# Patient Record
Sex: MALE | Race: WHITE | NOT HISPANIC OR LATINO | Employment: OTHER | URBAN - METROPOLITAN AREA
[De-identification: names, ages, dates, MRNs, and addresses within clinical notes are randomized per-mention and may not be internally consistent; named-entity substitution may affect disease eponyms.]

---

## 2017-03-09 ENCOUNTER — OFFICE VISIT (OUTPATIENT)
Dept: INTERNAL MEDICINE | Facility: CLINIC | Age: 67
End: 2017-03-09

## 2017-03-09 VITALS
WEIGHT: 187 LBS | SYSTOLIC BLOOD PRESSURE: 120 MMHG | HEART RATE: 80 BPM | TEMPERATURE: 97.4 F | DIASTOLIC BLOOD PRESSURE: 74 MMHG | OXYGEN SATURATION: 97 % | BODY MASS INDEX: 32.1 KG/M2 | RESPIRATION RATE: 16 BRPM

## 2017-03-09 DIAGNOSIS — J01.00 ACUTE NON-RECURRENT MAXILLARY SINUSITIS: Primary | ICD-10-CM

## 2017-03-09 PROCEDURE — 99213 OFFICE O/P EST LOW 20 MIN: CPT | Performed by: NURSE PRACTITIONER

## 2017-03-09 RX ORDER — AZITHROMYCIN 250 MG/1
TABLET, FILM COATED ORAL
Qty: 6 TABLET | Refills: 0 | Status: SHIPPED | OUTPATIENT
Start: 2017-03-09 | End: 2017-05-16

## 2017-03-09 RX ORDER — CETIRIZINE HYDROCHLORIDE 10 MG/1
10 TABLET ORAL DAILY
Qty: 30 TABLET | Refills: 2 | Status: SHIPPED | OUTPATIENT
Start: 2017-03-09 | End: 2018-06-08

## 2017-03-09 RX ORDER — AZITHROMYCIN 250 MG/1
TABLET, FILM COATED ORAL
COMMUNITY
Start: 2017-03-05 | End: 2017-03-09

## 2017-03-09 NOTE — PROGRESS NOTES
Vitals:    03/09/17 1308   BP: 120/74   Pulse: 80   Resp: 16   Temp: 97.4 °F (36.3 °C)   SpO2: 97%     Last 2 weights    03/09/17  1308   Weight: 187 lb (84.8 kg)     Social History   Substance Use Topics   • Smoking status: Never Smoker   • Smokeless tobacco: Not on file   • Alcohol use No       Subjective     HPI  Sunday went to [facility] and dx with sinusitis, given zpack. Last pill taken today. Today feeling a little better but still having some nasal congestion. Pt worried that he may regress when the weekend approaches. He did start taking his flonase this past week as well. Does not take an antihistamine. Pt has been drinking fluids.    The following portions of the patient's history were reviewed and updated as appropriate: allergies, current medications, past medical history, past social history and problem list.    Review of Systems   Constitutional: Negative.    HENT: Positive for congestion (nasal) and sinus pressure.    Respiratory: Negative.    Cardiovascular: Negative.        Objective     Physical Exam   Constitutional: He is oriented to person, place, and time. He appears well-developed and well-nourished.   HENT:   Head: Normocephalic and atraumatic.   Nose: Right sinus exhibits maxillary sinus tenderness. Left sinus exhibits maxillary sinus tenderness.   Neck: Normal range of motion.   Cardiovascular: Normal rate, regular rhythm and normal heart sounds.    Pulmonary/Chest: Effort normal and breath sounds normal.   Musculoskeletal: Normal range of motion.   Neurological: He is alert and oriented to person, place, and time.   Nursing note and vitals reviewed.      Assessment/Plan   Brian was seen today for sinusitis and uri.    Diagnoses and all orders for this visit:    Acute non-recurrent maxillary sinusitis    Other orders  -     azithromycin (ZITHROMAX) 250 MG tablet; Take 2 tablets the first day, then 1 tablet daily for 4 days.  -     cetirizine (zyrTEC) 10 MG tablet; Take 1 tablet by mouth  Daily.         -sent another zpack in. Advised pt to give till end of tomorrow to start another zpack. If symptoms continue to improve then do not start antibiotic. If he feels like symptoms are starting to worsen then start another zpack  -neti pot with distilled water, steamy shower therapy  -cont to drink fluids  -cont flonase consistently, and try zyrtec daily  -cont other home meds  -FU prn or if symptoms persist/worsen

## 2017-03-30 RX ORDER — SITAGLIPTIN 100 MG/1
TABLET, FILM COATED ORAL
Qty: 90 TABLET | Refills: 1 | Status: SHIPPED | OUTPATIENT
Start: 2017-03-30 | End: 2017-10-05 | Stop reason: SDUPTHER

## 2017-04-17 ENCOUNTER — OFFICE VISIT (OUTPATIENT)
Dept: INTERNAL MEDICINE | Facility: CLINIC | Age: 67
End: 2017-04-17

## 2017-04-17 VITALS
HEART RATE: 68 BPM | DIASTOLIC BLOOD PRESSURE: 71 MMHG | SYSTOLIC BLOOD PRESSURE: 129 MMHG | WEIGHT: 186.8 LBS | OXYGEN SATURATION: 96 % | BODY MASS INDEX: 31.89 KG/M2 | HEIGHT: 64 IN | TEMPERATURE: 97.5 F

## 2017-04-17 DIAGNOSIS — E11.9 NON-INSULIN DEPENDENT TYPE 2 DIABETES MELLITUS (HCC): ICD-10-CM

## 2017-04-17 DIAGNOSIS — N52.9 ED (ERECTILE DYSFUNCTION) OF ORGANIC ORIGIN: ICD-10-CM

## 2017-04-17 DIAGNOSIS — E78.49 OTHER HYPERLIPIDEMIA: ICD-10-CM

## 2017-04-17 DIAGNOSIS — R53.83 OTHER FATIGUE: ICD-10-CM

## 2017-04-17 DIAGNOSIS — J30.1 NON-SEASONAL ALLERGIC RHINITIS DUE TO POLLEN: ICD-10-CM

## 2017-04-17 DIAGNOSIS — Z00.00 WELCOME TO MEDICARE PREVENTIVE VISIT: Primary | ICD-10-CM

## 2017-04-17 PROCEDURE — 99214 OFFICE O/P EST MOD 30 MIN: CPT | Performed by: INTERNAL MEDICINE

## 2017-04-17 PROCEDURE — G0403 EKG FOR INITIAL PREVENT EXAM: HCPCS | Performed by: INTERNAL MEDICINE

## 2017-04-17 PROCEDURE — G0402 INITIAL PREVENTIVE EXAM: HCPCS | Performed by: INTERNAL MEDICINE

## 2017-04-17 RX ORDER — ERGOCALCIFEROL 1.25 MG/1
50000 CAPSULE ORAL
COMMUNITY
End: 2017-12-04 | Stop reason: SDUPTHER

## 2017-04-17 RX ORDER — VARDENAFIL HYDROCHLORIDE 20 MG/1
20 TABLET ORAL EVERY 24 HOURS
COMMUNITY
End: 2017-12-04 | Stop reason: SDUPTHER

## 2017-04-17 RX ORDER — OMEGA-3 FATTY ACIDS/FISH OIL 300-1000MG
1 CAPSULE ORAL
COMMUNITY
End: 2017-12-04

## 2017-04-17 RX ORDER — PROMETHAZINE HYDROCHLORIDE 25 MG/1
25 TABLET ORAL
COMMUNITY
Start: 2017-02-16 | End: 2017-12-21

## 2017-04-17 NOTE — PROGRESS NOTES
QUICK REFERENCE INFORMATION:  The ABCs of the Annual Wellness Visit    Welcome to Medicare Visit    HEALTH RISK ASSESSMENT    1950    Recent Hospitalizations:  No recent hospitalization(s)..      Current Medical Providers:  Patient Care Team:  Ludwin Reyna MD as PCP - General  Jensen Cabrales MD as PCP - Claims Attributed - PLEASE DO NOT REMOVE      Smoking Status:  History   Smoking Status   • Never Smoker   Smokeless Tobacco   • Not on file       Alcohol Consumption:  History   Alcohol Use No       Depression Screen:   PHQ-9 Depression Screening 4/17/2017   Little interest or pleasure in doing things 0   Feeling down, depressed, or hopeless 0   Trouble falling or staying asleep, or sleeping too much 0   Feeling tired or having little energy 0   Poor appetite or overeating 0   Feeling bad about yourself - or that you are a failure or have let yourself or your family down 0   Trouble concentrating on things, such as reading the newspaper or watching television 0   Moving or speaking so slowly that other people could have noticed. Or the opposite - being so fidgety or restless that you have been moving around a lot more than usual 0   Thoughts that you would be better off dead, or of hurting yourself in some way 0   PHQ-9 Total Score 0   If you checked off any problems, how difficult have these problems made it for you to do your work, take care of things at home, or get along with other people? Not difficult at all       Health Habits and Functional and Cognitive Screening:  Functional & Cognitive Status 4/17/2017   Do you have difficulty preparing food and eating? No   Do you have difficulty bathing yourself? No   Do you have difficulty getting dressed? No   Do you have difficulty using the toilet? No   Do you have difficulty moving around from place to place? No   In the past year have you fallen or experienced a near fall? Yes   Do you need help using the phone?  No   Are you deaf or do you have  serious difficulty hearing?  Yes   Do you need help with transportation? No   Do you need help shopping? No   Do you need help preparing meals?  No   Do you need help with housework?  No   Do you need help with laundry? No   Do you need help taking your medications? No   Do you need help managing money? No       Health Habits  Current Diet: Well Balanced Diet  Dental Exam: Up to date  Eye Exam: Up to date  Exercise (times per week): 3 times per week  Current Exercise Activities Include: Walking        Does the patient have evidence of cognitive impairment? No    Aspirin use counseling? Taking ASA appropriately as indicated      Recent Lab Results:  CMP:  Lab Results   Component Value Date     (H) 08/23/2016    BUN 15 08/23/2016    CREATININE 0.96 08/23/2016    EGFRIFNONA 79 08/23/2016    EGFRIFAFRI 95 08/23/2016    BCR 15.6 08/23/2016     08/23/2016    K 4.3 08/23/2016    CO2 26.7 08/23/2016    CALCIUM 9.3 08/23/2016    PROTENTOTREF 7.0 08/23/2016    ALBUMIN 4.80 08/23/2016    LABGLOBREF 2.2 08/23/2016    LABIL2 2.2 08/23/2016    BILITOT 0.3 08/23/2016    ALKPHOS 27 (L) 08/23/2016    AST 21 08/23/2016    ALT 33 08/23/2016     Lipid Panel:  Lab Results   Component Value Date    CHLPL 156 08/23/2016    TRIG 104 08/23/2016    HDL 44 08/23/2016    VLDL 20.8 08/23/2016    LDL 91 08/23/2016     HbA1c:  Lab Results   Component Value Date    HGBA1C 8.60 (H) 08/23/2016       Visual Acuity:   Visual Acuity Screening    Right eye Left eye Both eyes   Without correction: 20/20 20/20 20/20   With correction:          Age-appropriate Screening Schedule:  Refer to the list below for future screening recommendations based on patient's age, sex and/or medical conditions. Orders for these recommended tests are listed in the plan section. The patient has been provided with a written plan.    Health Maintenance   Topic Date Due   • TDAP/TD VACCINES (1 - Tdap) 05/31/1970   • DIABETIC FOOT EXAM  02/15/2016   • URINE  MICROALBUMIN  05/19/2016   • PNEUMOCOCCAL VACCINES (65+ LOW/MEDIUM RISK) (1 of 2 - PCV13) 05/31/2016   • INFLUENZA VACCINE  08/01/2016   • HEMOGLOBIN A1C  02/23/2017   • LIPID PANEL  08/23/2017   • DIABETIC EYE EXAM  12/06/2017   • COLONOSCOPY  02/15/2021   • ZOSTER VACCINE  Completed        Subjective   History of Present Illness    Brian Garcia is a 66 y.o. male an established patient presenting for a Welcome to Medicare Visit.     The following portions of the patient's history were reviewed and updated as appropriate: allergies, current medications, past family history, past medical history, past social history, past surgical history and problem list.    Outpatient Medications Prior to Visit   Medication Sig Dispense Refill   • aspirin 81 MG tablet Take 81 mg by mouth daily.     • atorvastatin (LIPITOR) 20 MG tablet Take 1 tablet by mouth daily.     • azithromycin (ZITHROMAX) 250 MG tablet Take 2 tablets the first day, then 1 tablet daily for 4 days. 6 tablet 0   • cetirizine (zyrTEC) 10 MG tablet Take 1 tablet by mouth Daily. 30 tablet 2   • EPINEPHrine (EPIPEN) 0.3 MG/0.3ML solution auto-injector injection EpiPen 2-Jeferson 0.3 MG/0.3ML FRANSISCA; Patient Sig: EpiPen 2-Jeferson 0.3 MG/0.3ML FRANSISCA USE AS DIRECTED.; 3; 3; 03-Jun-2013; Active     • famotidine (PEPCID) 20 MG tablet Take by mouth.     • fluticasone (FLONASE) 50 MCG/ACT nasal spray 1 spray into each nostril 2 (two) times a day.     • glipiZIDE (GLUCOTROL) 10 MG tablet TAKE 1 TABLET BY MOUTH 2 (TWO) TIMES A DAY BEFORE MEALS. 180 tablet 1   • HYDROcodone-acetaminophen (NORCO) 5-325 MG per tablet Take 1 tablet by mouth every 6 (six) hours as needed for moderate pain (4-6). 14 tablet 0   • JANUVIA 100 MG tablet TAKE 1 TABLET DAILY 90 tablet 1   • lisinopril (PRINIVIL,ZESTRIL) 2.5 MG tablet Take 1 tablet by mouth nightly.     • metaxalone (SKELAXIN) 800 MG tablet Take 1 tablet by mouth 3 (three) times a day as needed (pain). 18 tablet 0   • ofloxacin (OCUFLOX) 0.3 %  "ophthalmic solution Apply to eye.     • pioglitazone (ACTOS) 30 MG tablet Take 1 tablet by mouth daily. 30 tablet 5   • vardenafil (LEVITRA) 20 MG tablet Take 20 mg by mouth daily as needed for erectile dysfunction.     • vitamin D (ERGOCALCIFEROL) 25244 UNITS capsule capsule TAKE ONE CAPSULE BY MOUTH ONCE A MONTH 12 capsule 2     No facility-administered medications prior to visit.        Patient Active Problem List   Diagnosis   • Acute bronchitis   • Acute pharyngitis   • Acute upper respiratory infection   • Allergic rhinitis   • Carpal tunnel syndrome   • Cough   • Non-insulin dependent type 2 diabetes mellitus   • Alimentary obesity   • Fatigue   • HLD (hyperlipidemia)   • BP (high blood pressure)   • Effusion of knee   • Gonalgia   • ED (erectile dysfunction) of organic origin   • Muscle ache   • Thumb pain       Advance Care Planning:  has NO advance directive - not interested in additional information    Identification of Risk Factors:  Risk factors include: weight .    Review of Systems    Compared to one year ago, the patient feels his physical health is the same.  Compared to one year ago, the patient feels his mental health is the same.    Objective    Physical Exam    Vitals:    04/17/17 0854   BP: 129/71   BP Location: Left arm   Patient Position: Sitting   Cuff Size: Adult   Pulse: 68   Temp: 97.5 °F (36.4 °C)   TempSrc: Oral   SpO2: 96%   Weight: 186 lb 12.8 oz (84.7 kg)   Height: 64\" (162.6 cm)   PainSc:   4   PainLoc: Knee       Body mass index is 32.06 kg/(m^2).  Discussed the patient's BMI with him. The BMI is above average; BMI management plan is completed.    Procedure   Procedures       Assessment/Plan   Patient Self-Management and Personalized Health Advice  The patient has been provided with information about: diet, exercise and weight management and preventive services including:   · Nutrition counseling provided.    Visit Diagnoses:  No diagnosis found.    No orders of the defined types " were placed in this encounter.      Outpatient Encounter Prescriptions as of 4/17/2017   Medication Sig Dispense Refill   • aspirin 81 MG tablet Take 81 mg by mouth daily.     • atorvastatin (LIPITOR) 20 MG tablet Take 1 tablet by mouth daily.     • azithromycin (ZITHROMAX) 250 MG tablet Take 2 tablets the first day, then 1 tablet daily for 4 days. 6 tablet 0   • cetirizine (zyrTEC) 10 MG tablet Take 1 tablet by mouth Daily. 30 tablet 2   • EPINEPHrine (EPIPEN) 0.3 MG/0.3ML solution auto-injector injection EpiPen 2-Jeferson 0.3 MG/0.3ML FARNSISCA; Patient Sig: EpiPen 2-Jeferson 0.3 MG/0.3ML FRANSISCA USE AS DIRECTED.; 3; 3; 03-Jun-2013; Active     • famotidine (PEPCID) 20 MG tablet Take by mouth.     • fluticasone (FLONASE) 50 MCG/ACT nasal spray 1 spray into each nostril 2 (two) times a day.     • glipiZIDE (GLUCOTROL) 10 MG tablet TAKE 1 TABLET BY MOUTH 2 (TWO) TIMES A DAY BEFORE MEALS. 180 tablet 1   • HYDROcodone-acetaminophen (NORCO) 5-325 MG per tablet Take 1 tablet by mouth every 6 (six) hours as needed for moderate pain (4-6). 14 tablet 0   • JANUVIA 100 MG tablet TAKE 1 TABLET DAILY 90 tablet 1   • lisinopril (PRINIVIL,ZESTRIL) 2.5 MG tablet Take 1 tablet by mouth nightly.     • metaxalone (SKELAXIN) 800 MG tablet Take 1 tablet by mouth 3 (three) times a day as needed (pain). 18 tablet 0   • ofloxacin (OCUFLOX) 0.3 % ophthalmic solution Apply to eye.     • pioglitazone (ACTOS) 30 MG tablet Take 1 tablet by mouth daily. 30 tablet 5   • promethazine (PHENERGAN) 25 MG tablet Take 25 mg by mouth.     • vardenafil (LEVITRA) 20 MG tablet Take 20 mg by mouth daily as needed for erectile dysfunction.     • vitamin D (ERGOCALCIFEROL) 61451 UNITS capsule capsule TAKE ONE CAPSULE BY MOUTH ONCE A MONTH 12 capsule 2   • Acetaminophen 325 MG capsule Take 1 capsule by mouth.     • fluticasone (VERAMYST) 27.5 MCG/SPRAY nasal spray 2 sprays into each nostril Daily.     • Ibuprofen 200 MG capsule Take 1 capsule by mouth.     • vardenafil  (LEVITRA) 20 MG tablet Take 20 mg by mouth Daily.     • vitamin D (ERGOCALCIFEROL) 81077 UNITS capsule capsule Take 50,000 Units by mouth.       No facility-administered encounter medications on file as of 4/17/2017.        Reviewed use of high risk medication in the elderly: yes  Reviewed for potential of harmful drug interactions in the elderly: yes    Follow Up:  No Follow-up on file.     An After Visit Summary and PPPS with all of these plans were given to the patient.

## 2017-04-17 NOTE — PROGRESS NOTES
Procedure     ECG 12 Lead  Date/Time: 4/17/2017 9:40 AM  Performed by: TEDDY GARCIA  Authorized by: TEDDY GARCIA   Comparison: not compared with previous ECG   Rhythm: sinus rhythm  Rate: normal  Conduction: conduction normal  ST Segments: ST segments normal  T Waves: T waves normal  QRS axis: normal  Other: no other findings  Clinical impression: normal ECG

## 2017-04-17 NOTE — PATIENT INSTRUCTIONS
Medicare Wellness  Personal Prevention Plan of Service     Date of Office Visit:  2017  Encounter Provider:  Ludwin Reyna MD  Place of Service:  [facility]  Patient Name: Brian Garcia  :  1950    As part of the Medicare Wellness portion of your visit today, we are providing you with this personalized preventive plan of services (PPPS). This plan is based upon recommendations of the [place] Preventive Services Task Force (USPSTF) and the Advisory Committee on Immunization Practices (ACIP).    This lists the preventive care services that should be considered, and provides dates of when you are due. Items listed as completed are up-to-date and do not require any further intervention.    Health Maintenance   Topic Date Due   • TDAP/TD VACCINES (1 - Tdap) 1970   • HEMOGLOBIN A1C  2017   • LIPID PANEL  2017   • DIABETIC EYE EXAM  2017   • MEDICARE ANNUAL WELLNESS  2018   • DIABETIC FOOT EXAM  2018   • URINE MICROALBUMIN  2018   • COLONOSCOPY  02/15/2021   • HEPATITIS C SCREENING  Addressed   • INFLUENZA VACCINE  Addressed   • PNEUMOCOCCAL VACCINES (65+ LOW/MEDIUM RISK)  Addressed   • ZOSTER VACCINE  Completed       No orders of the defined types were placed in this encounter.      Return in about 6 months (around 10/17/2017) for Next scheduled follow up.

## 2017-04-18 LAB
ALBUMIN SERPL-MCNC: 4.5 G/DL (ref 3.5–5.2)
ALBUMIN/GLOB SERPL: 1.7 G/DL
ALP SERPL-CCNC: 26 U/L (ref 39–117)
ALT SERPL-CCNC: 30 U/L (ref 1–41)
APPEARANCE UR: CLEAR
AST SERPL-CCNC: 20 U/L (ref 1–40)
BACTERIA #/AREA URNS HPF: ABNORMAL /HPF
BASOPHILS # BLD AUTO: 0.03 10*3/MM3 (ref 0–0.2)
BASOPHILS NFR BLD AUTO: 0.5 % (ref 0–1.5)
BILIRUB SERPL-MCNC: 0.4 MG/DL (ref 0.1–1.2)
BILIRUB UR QL STRIP: NEGATIVE
BUN SERPL-MCNC: 16 MG/DL (ref 8–23)
BUN/CREAT SERPL: 14 (ref 7–25)
CALCIUM SERPL-MCNC: 9.5 MG/DL (ref 8.6–10.5)
CASTS URNS MICRO: ABNORMAL
CHLORIDE SERPL-SCNC: 100 MMOL/L (ref 98–107)
CHOLEST SERPL-MCNC: 179 MG/DL (ref 0–200)
CO2 SERPL-SCNC: 19.5 MMOL/L (ref 22–29)
COLOR UR: YELLOW
CONV COMMENT: ABNORMAL
CREAT SERPL-MCNC: 1.14 MG/DL (ref 0.76–1.27)
EOSINOPHIL # BLD AUTO: 0.09 10*3/MM3 (ref 0–0.7)
EOSINOPHIL NFR BLD AUTO: 1.6 % (ref 0.3–6.2)
EPI CELLS #/AREA URNS HPF: ABNORMAL /HPF
ERYTHROCYTE [DISTWIDTH] IN BLOOD BY AUTOMATED COUNT: 13.4 % (ref 11.5–14.5)
GLOBULIN SER CALC-MCNC: 2.6 GM/DL
GLUCOSE SERPL-MCNC: 186 MG/DL (ref 65–99)
GLUCOSE UR QL: NEGATIVE
HBA1C MFR BLD: 8.3 % (ref 4.8–5.6)
HCT VFR BLD AUTO: 46 % (ref 40.4–52.2)
HDLC SERPL-MCNC: 38 MG/DL (ref 40–60)
HGB BLD-MCNC: 14.9 G/DL (ref 13.7–17.6)
HGB UR QL STRIP: NEGATIVE
IMM GRANULOCYTES # BLD: 0 10*3/MM3 (ref 0–0.03)
IMM GRANULOCYTES NFR BLD: 0 % (ref 0–0.5)
KETONES UR QL STRIP: NEGATIVE
LDLC SERPL CALC-MCNC: 114 MG/DL (ref 0–100)
LDLC/HDLC SERPL: 3 {RATIO}
LEUKOCYTE ESTERASE UR QL STRIP: (no result)
LYMPHOCYTES # BLD AUTO: 2.31 10*3/MM3 (ref 0.9–4.8)
LYMPHOCYTES NFR BLD AUTO: 40 % (ref 19.6–45.3)
MCH RBC QN AUTO: 28.1 PG (ref 27–32.7)
MCHC RBC AUTO-ENTMCNC: 32.4 G/DL (ref 32.6–36.4)
MCV RBC AUTO: 86.6 FL (ref 79.8–96.2)
MICROALBUMIN UR-MCNC: 6.7 UG/ML
MONOCYTES # BLD AUTO: 0.63 10*3/MM3 (ref 0.2–1.2)
MONOCYTES NFR BLD AUTO: 10.9 % (ref 5–12)
NEUTROPHILS # BLD AUTO: 2.71 10*3/MM3 (ref 1.9–8.1)
NEUTROPHILS NFR BLD AUTO: 47 % (ref 42.7–76)
NITRITE UR QL STRIP: NEGATIVE
PH UR STRIP: 5.5 [PH] (ref 5–8)
PLATELET # BLD AUTO: 231 10*3/MM3 (ref 140–500)
POTASSIUM SERPL-SCNC: 4.6 MMOL/L (ref 3.5–5.2)
PROT SERPL-MCNC: 7.1 G/DL (ref 6–8.5)
PROT UR QL STRIP: NEGATIVE
RBC # BLD AUTO: 5.31 10*6/MM3 (ref 4.6–6)
RBC #/AREA URNS HPF: ABNORMAL /HPF
SODIUM SERPL-SCNC: 141 MMOL/L (ref 136–145)
SP GR UR: 1.03 (ref 1–1.03)
T4 FREE SERPL-MCNC: 1.28 NG/DL (ref 0.93–1.7)
TESTOST FREE SERPL-MCNC: 7.5 PG/ML (ref 6.6–18.1)
TESTOST SERPL-MCNC: 334 NG/DL (ref 348–1197)
TRIGL SERPL-MCNC: 135 MG/DL (ref 0–150)
TSH SERPL DL<=0.005 MIU/L-ACNC: 0.89 MIU/ML (ref 0.27–4.2)
UROBILINOGEN UR STRIP-MCNC: (no result) MG/DL
VLDLC SERPL CALC-MCNC: 27 MG/DL (ref 5–40)
WBC # BLD AUTO: 5.77 10*3/MM3 (ref 4.5–10.7)
WBC #/AREA URNS HPF: ABNORMAL /HPF

## 2017-04-23 NOTE — PROGRESS NOTES
Subjective   Brian Garcia is a 66 y.o. male.   He is here today for welcome to Medicare preventive visit along with hyperlipidemia allergic rhinitis non-insulin-dependent type 2 diabetes erectile dysfunction fatigue and he has no complaints besides fatigue  History of Present Illness   He is here today for Medicare annual visit welcome type along with hyper lipidemia allergic rhinitis non-insulin-dependent type 2 diabetes erectile dysfunction fatigue with no other complaints  The following portions of the patient's history were reviewed and updated as appropriate: allergies, current medications, past family history, past medical history, past social history, past surgical history and problem list.    Review of Systems   Constitutional: Positive for fatigue.   All other systems reviewed and are negative.      Objective   Physical Exam   Constitutional: He is oriented to person, place, and time. Vital signs are normal. He appears well-developed and well-nourished. He is active.   HENT:   Head: Normocephalic and atraumatic.   Right Ear: Hearing, tympanic membrane, external ear and ear canal normal.   Left Ear: Hearing, tympanic membrane, external ear and ear canal normal.   Nose: Nose normal.   Mouth/Throat: Uvula is midline, oropharynx is clear and moist and mucous membranes are normal.   Eyes: Conjunctivae, EOM and lids are normal. Pupils are equal, round, and reactive to light. Right eye exhibits no discharge. Left eye exhibits no discharge.   Neck: Trachea normal, normal range of motion, full passive range of motion without pain and phonation normal. Neck supple. Carotid bruit is not present. No edema present. No thyroid mass and no thyromegaly present.   Cardiovascular: Normal rate, regular rhythm, normal heart sounds, intact distal pulses and normal pulses.  Exam reveals no gallop and no friction rub.    No murmur heard.  Pulmonary/Chest: Effort normal and breath sounds normal. No respiratory distress. He has no  wheezes. He has no rales.   Abdominal: Soft. Normal appearance, normal aorta and bowel sounds are normal. He exhibits no distension, no abdominal bruit and no mass. There is no hepatosplenomegaly. There is no tenderness. There is no rebound, no guarding and no CVA tenderness. No hernia. Hernia confirmed negative in the right inguinal area and confirmed negative in the left inguinal area.   Musculoskeletal: Normal range of motion. He exhibits no edema or tenderness.    Brian had a diabetic foot exam performed today.   During the foot exam he had a monofilament test performed (No neuropathy).    Vascular Status -  His exam exhibits right foot vasculature normal. His exam exhibits no right foot edema. His exam exhibits left foot vasculature normal. His exam exhibits no left foot edema.   Skin Integrity  -  His right foot skin is intact.     Brian 's left foot skin is intact. .  Lymphadenopathy:     He has no cervical adenopathy.     He has no axillary adenopathy.        Right: No inguinal and no supraclavicular adenopathy present.        Left: No inguinal and no supraclavicular adenopathy present.   Neurological: He is alert and oriented to person, place, and time. He has normal strength. No cranial nerve deficit or sensory deficit. He exhibits normal muscle tone. He displays a negative Romberg sign. Coordination normal.   Skin: Skin is warm, dry and intact. No cyanosis. Nails show no clubbing.   Psychiatric: He has a normal mood and affect. His speech is normal and behavior is normal. Judgment and thought content normal. Cognition and memory are normal.   Nursing note and vitals reviewed.      Assessment/Plan   Diagnoses and all orders for this visit:    Welcome to Medicare preventive visit  -     Hemoglobin A1c  -     Lipid Panel With LDL / HDL Ratio  -     MicroAlbumin, Urine, Random  -     TSH  -     T4, Free  -     CBC & Differential  -     Comprehensive Metabolic Panel  -     Testosterone, Free, Total  -      Urinalysis With Microscopic  -     ECG 12 Lead    Other hyperlipidemia  -     Hemoglobin A1c  -     Lipid Panel With LDL / HDL Ratio  -     MicroAlbumin, Urine, Random  -     TSH  -     T4, Free  -     CBC & Differential  -     Comprehensive Metabolic Panel  -     Testosterone, Free, Total  -     Urinalysis With Microscopic    Non-seasonal allergic rhinitis due to pollen  -     Hemoglobin A1c  -     Lipid Panel With LDL / HDL Ratio  -     MicroAlbumin, Urine, Random  -     TSH  -     T4, Free  -     CBC & Differential  -     Comprehensive Metabolic Panel  -     Testosterone, Free, Total  -     Urinalysis With Microscopic    Non-insulin dependent type 2 diabetes mellitus  -     Hemoglobin A1c  -     Lipid Panel With LDL / HDL Ratio  -     MicroAlbumin, Urine, Random  -     TSH  -     T4, Free  -     CBC & Differential  -     Comprehensive Metabolic Panel  -     Testosterone, Free, Total  -     Urinalysis With Microscopic    ED (erectile dysfunction) of organic origin  -     Hemoglobin A1c  -     Lipid Panel With LDL / HDL Ratio  -     MicroAlbumin, Urine, Random  -     TSH  -     T4, Free  -     CBC & Differential  -     Comprehensive Metabolic Panel  -     Testosterone, Free, Total  -     Urinalysis With Microscopic    Other fatigue  -     Hemoglobin A1c  -     Lipid Panel With LDL / HDL Ratio  -     MicroAlbumin, Urine, Random  -     TSH  -     T4, Free  -     CBC & Differential  -     Comprehensive Metabolic Panel  -     Testosterone, Free, Total  -     Urinalysis With Microscopic    Other orders  -     Microscopic Examination      Welcome to Medicare preventive visit follow-up recommendations  Fatigue check labs  Non-insulin-dependent type 2 diabetes follow hemoglobin A1 C with yearly ophthalmology visits  Erectile dysfunction supportive meds for this  Allergic rhinitis supportive meds for this as well  Hyper lipidemia keep LDL less than 70 with proper diet exercise medication

## 2017-05-16 ENCOUNTER — OFFICE VISIT (OUTPATIENT)
Dept: INTERNAL MEDICINE | Facility: CLINIC | Age: 67
End: 2017-05-16

## 2017-05-16 VITALS
HEART RATE: 69 BPM | WEIGHT: 184 LBS | BODY MASS INDEX: 31.41 KG/M2 | TEMPERATURE: 96 F | HEIGHT: 64 IN | OXYGEN SATURATION: 97 % | RESPIRATION RATE: 16 BRPM | SYSTOLIC BLOOD PRESSURE: 120 MMHG | DIASTOLIC BLOOD PRESSURE: 60 MMHG
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
## 2017-05-16 DIAGNOSIS — S00.96XA INSECT BITE OF HEAD WITH LOCAL REACTION, INITIAL ENCOUNTER: Primary | ICD-10-CM

## 2017-05-16 PROCEDURE — 99213 OFFICE O/P EST LOW 20 MIN: CPT | Performed by: NURSE PRACTITIONER

## 2017-05-16 RX ORDER — METHYLPREDNISOLONE 4 MG/1
TABLET ORAL
Qty: 21 TABLET | Refills: 0 | Status: SHIPPED | OUTPATIENT
Start: 2017-05-16 | End: 2017-06-05 | Stop reason: SDUPTHER

## 2017-05-16 RX ORDER — DOXYCYCLINE HYCLATE 100 MG
100 TABLET ORAL 2 TIMES DAILY
Qty: 20 TABLET | Refills: 0 | Status: SHIPPED | OUTPATIENT
Start: 2017-05-16 | End: 2017-06-05 | Stop reason: SDUPTHER

## 2017-06-05 ENCOUNTER — OFFICE VISIT (OUTPATIENT)
Dept: INTERNAL MEDICINE | Facility: CLINIC | Age: 67
End: 2017-06-05

## 2017-06-05 VITALS
TEMPERATURE: 98.3 F | RESPIRATION RATE: 16 BRPM | WEIGHT: 184 LBS | HEART RATE: 71 BPM | OXYGEN SATURATION: 96 % | BODY MASS INDEX: 31.41 KG/M2 | DIASTOLIC BLOOD PRESSURE: 71 MMHG | SYSTOLIC BLOOD PRESSURE: 114 MMHG | HEIGHT: 64 IN

## 2017-06-05 DIAGNOSIS — Z91.030 BEE STING ALLERGY: ICD-10-CM

## 2017-06-05 DIAGNOSIS — L03.115 CELLULITIS OF RIGHT LOWER EXTREMITY: Primary | ICD-10-CM

## 2017-06-05 DIAGNOSIS — S00.96XA INSECT BITE OF HEAD WITH LOCAL REACTION, INITIAL ENCOUNTER: ICD-10-CM
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
## 2017-06-05 PROCEDURE — 99213 OFFICE O/P EST LOW 20 MIN: CPT | Performed by: INTERNAL MEDICINE

## 2017-06-05 RX ORDER — DOXYCYCLINE HYCLATE 100 MG
100 TABLET ORAL 2 TIMES DAILY
Qty: 20 TABLET | Refills: 1 | Status: SHIPPED | OUTPATIENT
Start: 2017-06-05 | End: 2018-04-19

## 2017-06-05 RX ORDER — EPINEPHRINE 0.3 MG/.3ML
0.3 INJECTION SUBCUTANEOUS ONCE
Qty: 1 EACH | Refills: 2 | Status: SHIPPED | OUTPATIENT
Start: 2017-06-05 | End: 2023-03-31

## 2017-06-05 RX ORDER — METHYLPREDNISOLONE 4 MG/1
TABLET ORAL
Qty: 21 TABLET | Refills: 1 | Status: SHIPPED | OUTPATIENT
Start: 2017-06-05 | End: 2018-04-13

## 2017-06-19 RX ORDER — GLIPIZIDE 10 MG/1
TABLET ORAL
Qty: 180 TABLET | Refills: 1 | Status: SHIPPED | OUTPATIENT
Start: 2017-06-19 | End: 2017-12-21

## 2017-06-20 NOTE — PROGRESS NOTES
Subjective   Brian Garcia is a 67 y.o. male.   He is here today for cellulitis right lower extremity along with bee sting allergy which causes cellulitis as well as insect bite of head with local reaction as well  History of Present Illness   He is here today for saline as of right lower extremity from a bee sting and insect bite on the head as well as on the leg  The following portions of the patient's history were reviewed and updated as appropriate: allergies, current medications, past family history, past medical history, past social history, past surgical history and problem list.    Review of Systems   Skin: Positive for color change (leg with cellulitis bite on the back of the head).   All other systems reviewed and are negative.      Objective   Physical Exam   Constitutional: He is oriented to person, place, and time. He appears well-developed and well-nourished. He is cooperative.   HENT:   Head: Normocephalic and atraumatic.   Right Ear: Hearing, tympanic membrane, external ear and ear canal normal.   Left Ear: Hearing, tympanic membrane, external ear and ear canal normal.   Nose: Nose normal.   Mouth/Throat: Uvula is midline, oropharynx is clear and moist and mucous membranes are normal.   Eyes: Conjunctivae, EOM and lids are normal. Pupils are equal, round, and reactive to light.   Neck: Phonation normal. Neck supple. Carotid bruit is not present.   Cardiovascular: Normal rate, regular rhythm and normal heart sounds.  Exam reveals no gallop and no friction rub.    No murmur heard.  Pulmonary/Chest: Effort normal and breath sounds normal. No respiratory distress.   Abdominal: Soft. Bowel sounds are normal. He exhibits no distension and no mass. There is no hepatosplenomegaly. There is no tenderness. There is no rebound and no guarding. No hernia.   Musculoskeletal: He exhibits no edema.   Neurological: He is alert and oriented to person, place, and time. Coordination and gait normal.   Skin: Skin is warm  and dry. There is erythema (arietal region of head and lower leg).   Psychiatric: He has a normal mood and affect. His speech is normal and behavior is normal. Judgment and thought content normal.   Nursing note and vitals reviewed.      Assessment/Plan   Diagnoses and all orders for this visit:    Cellulitis of right lower extremity    Bee sting allergy    Insect bite of head with local reaction, initial encounter  -     doxycycline (VIBRAMYICN) 100 MG tablet; Take 1 tablet by mouth 2 (Two) Times a Day. For bee sting and cellulitis from sting  -     MethylPREDNISolone (MEDROL, CORBY,) 4 MG tablet; Take as directed on package instructions for Bee sting    Other orders  -     EPINEPHrine (EPIPEN) 0.3 MG/0.3ML solution auto-injector injection; Inject 0.3 mL into the shoulder, thigh, or buttocks 1 (One) Time for 1 dose. As needed for bee sting or severe allergic reaction      Cellulitis right lower extremity medication for this  Bee sting allergy new EpiPen and  Insect bite of head with local reaction medication for this as well

## 2017-10-05 RX ORDER — SITAGLIPTIN 100 MG/1
TABLET, FILM COATED ORAL
Qty: 90 TABLET | Refills: 1 | Status: SHIPPED | OUTPATIENT
Start: 2017-10-05 | End: 2017-12-21 | Stop reason: SDUPTHER

## 2017-12-04 ENCOUNTER — OFFICE VISIT (OUTPATIENT)
Dept: INTERNAL MEDICINE | Facility: CLINIC | Age: 67
End: 2017-12-04

## 2017-12-04 VITALS
BODY MASS INDEX: 31.24 KG/M2 | HEIGHT: 64 IN | HEART RATE: 66 BPM | SYSTOLIC BLOOD PRESSURE: 125 MMHG | DIASTOLIC BLOOD PRESSURE: 78 MMHG | OXYGEN SATURATION: 96 % | TEMPERATURE: 98.1 F | RESPIRATION RATE: 16 BRPM | WEIGHT: 183 LBS

## 2017-12-04 DIAGNOSIS — E78.49 OTHER HYPERLIPIDEMIA: ICD-10-CM

## 2017-12-04 DIAGNOSIS — N52.9 ED (ERECTILE DYSFUNCTION) OF ORGANIC ORIGIN: ICD-10-CM

## 2017-12-04 DIAGNOSIS — E66.9 ALIMENTARY OBESITY: ICD-10-CM

## 2017-12-04 DIAGNOSIS — E11.9 NON-INSULIN DEPENDENT TYPE 2 DIABETES MELLITUS (HCC): Primary | ICD-10-CM

## 2017-12-04 DIAGNOSIS — J30.1 CHRONIC SEASONAL ALLERGIC RHINITIS DUE TO POLLEN: ICD-10-CM

## 2017-12-04 PROBLEM — Z91.030 BEE STING ALLERGY: Status: RESOLVED | Noted: 2017-06-05 | Resolved: 2017-12-04

## 2017-12-04 PROBLEM — L03.115 CELLULITIS OF RIGHT LOWER EXTREMITY: Status: RESOLVED | Noted: 2017-06-05 | Resolved: 2017-12-04

## 2017-12-04 LAB
ALBUMIN SERPL-MCNC: 4.6 G/DL (ref 3.5–5.2)
ALBUMIN/GLOB SERPL: 1.8 G/DL
ALP SERPL-CCNC: 32 U/L (ref 39–117)
ALT SERPL-CCNC: 31 U/L (ref 1–41)
APPEARANCE UR: CLEAR
AST SERPL-CCNC: 20 U/L (ref 1–40)
BACTERIA #/AREA URNS HPF: ABNORMAL /HPF
BASOPHILS # BLD AUTO: 0.04 10*3/MM3 (ref 0–0.2)
BASOPHILS NFR BLD AUTO: 0.6 % (ref 0–1.5)
BILIRUB SERPL-MCNC: 0.7 MG/DL (ref 0.1–1.2)
BILIRUB UR QL STRIP: NEGATIVE
BUN SERPL-MCNC: 16 MG/DL (ref 8–23)
BUN/CREAT SERPL: 15.4 (ref 7–25)
CALCIUM SERPL-MCNC: 9.7 MG/DL (ref 8.6–10.5)
CASTS URNS MICRO: ABNORMAL
CHLORIDE SERPL-SCNC: 100 MMOL/L (ref 98–107)
CHOLEST SERPL-MCNC: 160 MG/DL (ref 0–200)
CO2 SERPL-SCNC: 27.6 MMOL/L (ref 22–29)
COLOR UR: YELLOW
CREAT SERPL-MCNC: 1.04 MG/DL (ref 0.76–1.27)
EOSINOPHIL # BLD AUTO: 0.13 10*3/MM3 (ref 0–0.7)
EOSINOPHIL NFR BLD AUTO: 2 % (ref 0.3–6.2)
EPI CELLS #/AREA URNS HPF: ABNORMAL /HPF
ERYTHROCYTE [DISTWIDTH] IN BLOOD BY AUTOMATED COUNT: 13.3 % (ref 11.5–14.5)
GFR SERPLBLD CREATININE-BSD FMLA CKD-EPI: 71 ML/MIN/1.73
GFR SERPLBLD CREATININE-BSD FMLA CKD-EPI: 87 ML/MIN/1.73
GLOBULIN SER CALC-MCNC: 2.6 GM/DL
GLUCOSE SERPL-MCNC: 180 MG/DL (ref 65–99)
GLUCOSE UR QL: NEGATIVE
HBA1C MFR BLD: 8.56 % (ref 4.8–5.6)
HCT VFR BLD AUTO: 47.2 % (ref 40.4–52.2)
HDLC SERPL-MCNC: 39 MG/DL (ref 40–60)
HGB BLD-MCNC: 15.6 G/DL (ref 13.7–17.6)
HGB UR QL STRIP: NEGATIVE
IMM GRANULOCYTES # BLD: 0 10*3/MM3 (ref 0–0.03)
IMM GRANULOCYTES NFR BLD: 0 % (ref 0–0.5)
KETONES UR QL STRIP: NEGATIVE
LDLC SERPL CALC-MCNC: 98 MG/DL (ref 0–100)
LDLC/HDLC SERPL: 2.51 {RATIO}
LEUKOCYTE ESTERASE UR QL STRIP: (no result)
LYMPHOCYTES # BLD AUTO: 2.27 10*3/MM3 (ref 0.9–4.8)
LYMPHOCYTES NFR BLD AUTO: 34.6 % (ref 19.6–45.3)
MCH RBC QN AUTO: 28.7 PG (ref 27–32.7)
MCHC RBC AUTO-ENTMCNC: 33.1 G/DL (ref 32.6–36.4)
MCV RBC AUTO: 86.9 FL (ref 79.8–96.2)
MONOCYTES # BLD AUTO: 0.73 10*3/MM3 (ref 0.2–1.2)
MONOCYTES NFR BLD AUTO: 11.1 % (ref 5–12)
NEUTROPHILS # BLD AUTO: 3.39 10*3/MM3 (ref 1.9–8.1)
NEUTROPHILS NFR BLD AUTO: 51.7 % (ref 42.7–76)
NITRITE UR QL STRIP: NEGATIVE
PH UR STRIP: 6 [PH] (ref 5–8)
PLATELET # BLD AUTO: 249 10*3/MM3 (ref 140–500)
POTASSIUM SERPL-SCNC: 4.6 MMOL/L (ref 3.5–5.2)
PROT SERPL-MCNC: 7.2 G/DL (ref 6–8.5)
PROT UR QL STRIP: NEGATIVE
RBC # BLD AUTO: 5.43 10*6/MM3 (ref 4.6–6)
RBC #/AREA URNS HPF: ABNORMAL /HPF
SODIUM SERPL-SCNC: 140 MMOL/L (ref 136–145)
SP GR UR: 1.02 (ref 1–1.03)
T4 FREE SERPL-MCNC: 1.29 NG/DL (ref 0.93–1.7)
TRIGL SERPL-MCNC: 115 MG/DL (ref 0–150)
TSH SERPL DL<=0.005 MIU/L-ACNC: 0.88 MIU/ML (ref 0.27–4.2)
UROBILINOGEN UR STRIP-MCNC: (no result) MG/DL
VLDLC SERPL CALC-MCNC: 23 MG/DL (ref 5–40)
WBC # BLD AUTO: 6.56 10*3/MM3 (ref 4.5–10.7)
WBC #/AREA URNS HPF: ABNORMAL /HPF

## 2017-12-04 PROCEDURE — 99214 OFFICE O/P EST MOD 30 MIN: CPT | Performed by: INTERNAL MEDICINE

## 2017-12-04 RX ORDER — TADALAFIL 5 MG/1
TABLET ORAL
Qty: 30 TABLET | Refills: 5 | Status: SHIPPED | OUTPATIENT
Start: 2017-12-04 | End: 2019-02-27 | Stop reason: SDUPTHER

## 2017-12-12 DIAGNOSIS — E11.9 NON-INSULIN DEPENDENT TYPE 2 DIABETES MELLITUS (HCC): Primary | ICD-10-CM

## 2017-12-17 NOTE — PROGRESS NOTES
Subjective   Brian Garcia is a 67 y.o. male.   He is here today follow-up for non-insulin-dependent type 2 diabetes hyperlipidemia chronic seasonal allergic rhinitis erectile dysfunction obesity  History of Present Illness   He is here to follow-up for non-insulin-dependent type 2 diabetes which has gotten worse since last time along with hyperlipidemia which is controlled on current medications and chronic seasonal allergic rhinitis for which she requires antihistamines and/or Singulair and nasal sprays and erectile dysfunction which is still a problem and obesity which is still present but not really changed since last time  The following portions of the patient's history were reviewed and updated as appropriate: allergies, current medications, past family history, past medical history, past social history, past surgical history and problem list.    Review of Systems   Genitourinary:        Erectile dysfunction   All other systems reviewed and are negative.      Objective   Physical Exam   Constitutional: He is oriented to person, place, and time. He appears well-developed and well-nourished. He is cooperative.   HENT:   Head: Normocephalic and atraumatic.   Right Ear: Hearing, tympanic membrane, external ear and ear canal normal.   Left Ear: Hearing, tympanic membrane, external ear and ear canal normal.   Nose: Nose normal.   Mouth/Throat: Uvula is midline, oropharynx is clear and moist and mucous membranes are normal.   Eyes: Conjunctivae, EOM and lids are normal. Pupils are equal, round, and reactive to light.   Neck: Phonation normal. Neck supple. Carotid bruit is not present.   Cardiovascular: Normal rate, regular rhythm and normal heart sounds.  Exam reveals no gallop and no friction rub.    No murmur heard.  Pulmonary/Chest: Effort normal and breath sounds normal. No respiratory distress.   Abdominal: Soft. Bowel sounds are normal. He exhibits no distension and no mass. There is no hepatosplenomegaly. There  is no tenderness. There is no rebound and no guarding. No hernia.   Musculoskeletal: He exhibits no edema.   Neurological: He is alert and oriented to person, place, and time. Coordination and gait normal.   Skin: Skin is warm and dry.   Psychiatric: He has a normal mood and affect. His speech is normal and behavior is normal. Judgment and thought content normal.   Nursing note and vitals reviewed.      Assessment/Plan   Diagnoses and all orders for this visit:    Non-insulin dependent type 2 diabetes mellitus  -     Hemoglobin A1c  -     CBC & Differential  -     Comprehensive Metabolic Panel  -     T4, Free  -     TSH  -     Urinalysis With Microscopic - Urine, Clean Catch  -     Lipid Panel With LDL / HDL Ratio    Other hyperlipidemia  -     Hemoglobin A1c  -     CBC & Differential  -     Comprehensive Metabolic Panel  -     T4, Free  -     TSH  -     Urinalysis With Microscopic - Urine, Clean Catch  -     Lipid Panel With LDL / HDL Ratio    Chronic seasonal allergic rhinitis due to pollen  -     Hemoglobin A1c  -     CBC & Differential  -     Comprehensive Metabolic Panel  -     T4, Free  -     TSH  -     Urinalysis With Microscopic - Urine, Clean Catch  -     Lipid Panel With LDL / HDL Ratio    ED (erectile dysfunction) of organic origin  -     Hemoglobin A1c  -     CBC & Differential  -     Comprehensive Metabolic Panel  -     T4, Free  -     TSH  -     Urinalysis With Microscopic - Urine, Clean Catch  -     Lipid Panel With LDL / HDL Ratio    Alimentary obesity  -     Hemoglobin A1c  -     CBC & Differential  -     Comprehensive Metabolic Panel  -     T4, Free  -     TSH  -     Urinalysis With Microscopic - Urine, Clean Catch  -     Lipid Panel With LDL / HDL Ratio    Other orders  -     tadalafil (CIALIS) 5 MG tablet; Take 1 daily for BPH  -     Microscopic Examination      NIDDM follow hemoglobin A1c which is worse now and we will now refer him to endocrinology  Obesity weight loss with proper diet exercise  medication and so far nothing has worked and he will need nutrition counseling and self report  Erectile dysfunction supportive meds including Cialis for this  Chronic seasonal allergic rhinitis continue antihistamines and nasal sprays and Singulair as needed for this hyper lipidemia keep LDL less than 70 with proper diet exercise medication and he is tolerating the medications well and his weight is still not down which would help his cholesterol

## 2017-12-21 ENCOUNTER — OFFICE VISIT (OUTPATIENT)
Dept: ENDOCRINOLOGY | Age: 67
End: 2017-12-21

## 2017-12-21 VITALS
SYSTOLIC BLOOD PRESSURE: 124 MMHG | DIASTOLIC BLOOD PRESSURE: 82 MMHG | HEIGHT: 64 IN | WEIGHT: 187.5 LBS | BODY MASS INDEX: 32.01 KG/M2

## 2017-12-21 DIAGNOSIS — IMO0002 UNCONTROLLED TYPE 2 DIABETES MELLITUS WITH COMPLICATION, WITHOUT LONG-TERM CURRENT USE OF INSULIN: Primary | ICD-10-CM

## 2017-12-21 DIAGNOSIS — E78.2 MIXED HYPERLIPIDEMIA: ICD-10-CM

## 2017-12-21 DIAGNOSIS — E55.9 VITAMIN D DEFICIENCY: ICD-10-CM

## 2017-12-21 DIAGNOSIS — R53.82 CHRONIC FATIGUE: ICD-10-CM

## 2017-12-21 PROCEDURE — 99214 OFFICE O/P EST MOD 30 MIN: CPT | Performed by: NURSE PRACTITIONER

## 2017-12-21 RX ORDER — LANCETS
EACH MISCELLANEOUS
Qty: 204 EACH | Refills: 5 | Status: SHIPPED | OUTPATIENT
Start: 2017-12-21 | End: 2019-02-27 | Stop reason: CLARIF

## 2017-12-21 NOTE — PATIENT INSTRUCTIONS
Synjardy 12.5/1000mg - 1 by mouth twice daily-with this medication take over-the-counter vitamin C 500 mg and continue vitamin D as previously prescribed increase water at least 4 bottles daily especially the first 3-4 weeks while starting this medication (can start one by mouth for 1 week and then add second dose and titrate up to twice daily    Start Bydureon 2 mg injection one weekly    Continue Januvia 100 mg once in the morning    home blood tests -  Blood glucose testing: 3 times daily, that are:  fasting- 1st thing in morning before eating or drinking  before each meal and 1 or 2 hours after meal  bedtime  anytime you feel symptoms of hyperglycemia or hypoglycemia (high or low blood sugars)

## 2017-12-21 NOTE — PROGRESS NOTES
Brian Garcia who presents for consult/evaluation per request of Ludwin Reyna MD for Type 2 diabetes mellitus insulin dependent. The initial diagnosis of diabetes was made 16-17 years ago.    The condition of diabetes location is system with the course being clinical course has fluctuated , the severity is Moderate , and the modifying/allievating factors are  oral medications. Insulin dosage review with Brian Garcia suggested compliance most of the time   .       Associated symptoms of hyperglycemia have been none.  Associated symptoms of hypoglycemia have been none. Patient reports  hypoglycemia threshold for symptoms is n/a mg/dl .       The patient is currently taking home blood tests - Blood glucose testing: 3-4 times daily, that are:  fasting- 1st thing in morning before eating or drinking  before each meal  before each meal and 1 or 2 hours after meal     Compliance with blood glucose monitoring: inadequate.     Exercise:intermittently with meal panning: The patient is using no plan.    Home blood glucose testing daily: 3-4  FB to 237 mg/dl  before lunch 160 to 180 mg/dl  after lunch 195 to 264 mg/dl   after dinner/supper 129 to 269 mg/dl    Patterns reported per patient are none.      The following portions of the patient's history were reviewed and updated as appropriate: current medications, past family history, past medical history, past social history, past surgical history and problem list.        Medical records, chart, and labs reviewed from primary care provider.      Current Outpatient Prescriptions:   •  aspirin 81 MG tablet, Take 81 mg by mouth daily., Disp: , Rfl:   •  cetirizine (zyrTEC) 10 MG tablet, Take 1 tablet by mouth Daily., Disp: 30 tablet, Rfl: 2  •  doxycycline (VIBRAMYICN) 100 MG tablet, Take 1 tablet by mouth 2 (Two) Times a Day. For bee sting and cellulitis from sting, Disp: 20 tablet, Rfl: 1  •  fluticasone (FLONASE) 50 MCG/ACT nasal spray, 1 spray into each nostril 2  (two) times a day., Disp: , Rfl:   •  fluticasone (VERAMYST) 27.5 MCG/SPRAY nasal spray, 2 sprays into each nostril Daily., Disp: , Rfl:   •  MethylPREDNISolone (MEDROL, CORBY,) 4 MG tablet, Take as directed on package instructions for Bee sting, Disp: 21 tablet, Rfl: 1  •  SITagliptin (JANUVIA) 100 MG tablet, Take 1 tablet by mouth Daily., Disp: 90 tablet, Rfl: 1  •  tadalafil (CIALIS) 5 MG tablet, Take 1 daily for BPH, Disp: 30 tablet, Rfl: 5  •  vitamin D (ERGOCALCIFEROL) 32849 UNITS capsule capsule, TAKE ONE CAPSULE BY MOUTH ONCE A MONTH, Disp: 12 capsule, Rfl: 2  •  ACCU-CHEK FASTCLIX LANCETS misc, Test 4 times daily, Disp: 204 each, Rfl: 5  •  Blood Glucose Monitoring Suppl (ACCU-CHEK ROMAN) device, Use as instructed, Disp: 1 each, Rfl: 0  •  Empagliflozin-Metformin HCl 12.5-1000 MG tablet, Take 12.5 mg by mouth 2 (Two) Times a Day., Disp: 60 tablet, Rfl: 4  •  Exenatide ER (BYDUREON) 2 MG pen-injector, Inject 2 mg under the skin 1 (One) Time for 1 dose., Disp: 4 each, Rfl: 4  •  glucose blood (ACCU-CHEK ROMAN) test strip, Test 4 times daily, Disp: 125 each, Rfl: 5    Patient Active Problem List    Diagnosis   • Uncontrolled type 2 diabetes mellitus with complication, without long-term current use of insulin [E11.8, E11.65]   • Mixed hyperlipidemia [E78.2]   • Vitamin D deficiency [E55.9]   • Welcome to Medicare preventive visit [Z00.00]   • Acute bronchitis [J20.9]   • Acute pharyngitis [J02.9]   • Acute upper respiratory infection [J06.9]   • Allergic rhinitis [J30.9]   • Carpal tunnel syndrome [G56.00]   • Cough [R05]   • Alimentary obesity [E66.9]   • Fatigue [R53.83]   • Effusion of knee [M25.469]   • Gonalgia [M25.569]   • ED (erectile dysfunction) of organic origin [N52.9]   • Muscle ache [M79.1]   • Thumb pain [M79.646]       Review of Systems  A comprehensive review of the 14 systems was negative except of listed below:  Constitutional: fatigue  Eyes: positive for visual disturbance  Integument/breast:  "positive for difficulty healing  Behavioral/Psych: positive for increased appetite and sleep disturbance  Endocrine: diabetic symptoms including increased fatigue, polydipsia, polyphagia, polyuria, pruritus and skin dryness  hyperglycemia     Objective:     Wt Readings from Last 3 Encounters:   12/21/17 85 kg (187 lb 8 oz)   12/04/17 83 kg (183 lb)   06/05/17 83.5 kg (184 lb)     Temp Readings from Last 3 Encounters:   12/04/17 98.1 °F (36.7 °C) (Oral)   06/05/17 98.3 °F (36.8 °C) (Oral)   05/16/17 96 °F (35.6 °C) (Tympanic)     BP Readings from Last 3 Encounters:   12/21/17 124/82   12/04/17 125/78   06/05/17 114/71     Pulse Readings from Last 3 Encounters:   12/04/17 66   06/05/17 71   05/16/17 69         /82  Ht 162.6 cm (64.02\")  Wt 85 kg (187 lb 8 oz)  BMI 32.17 kg/m2    General appearance:  alert, cooperative,orientated, , fatigued, nourished\" \"appears stated age and cooperative\" \"NAD   Neck: no carotid bruit, supple, symmetrical, trachea midline and thyroid not enlarged, symmetric, no tenderness/mass/nodules   Thyroid:  no palpable nodule, no tenderness, no enlargement,    Lung:  \"clear to auscultation bilaterally\" \"no abnormal breath sounds  \" effort was normal, no labored breath, no use of accessory muscles.   Heart: regular rate and rhythm, S1, S2 normal, no murmur, click, rub or gallop      Abdomen:  normal bowel sounds- 4 quads, soft non-tender and contour - slt rounded      Extremities: [extremities normal, atraumatic, no cyanosis or edema\" WNL - gait and station, strength and stability\"          Skin:   Pulses:  warm and dry, no hyperpigmentation, normal skin coloring, or suspicious lesions  2+ and symmetric   Neuro: Alert and oriented x3. Gait normal. Reflexes and motor strength normal and symmetric. Cranial nerves 2-12 and sensation grossly intact.      Psych: behavior - normal, judgement - normal, mood - normal, affect - normal                 Lab Review  Results for orders placed or " performed in visit on 12/04/17   Hemoglobin A1c   Result Value Ref Range    Hemoglobin A1C 8.56 (H) 4.80 - 5.60 %   Comprehensive Metabolic Panel   Result Value Ref Range    Glucose 180 (H) 65 - 99 mg/dL    BUN 16 8 - 23 mg/dL    Creatinine 1.04 0.76 - 1.27 mg/dL    eGFR Non African Am 71 >60 mL/min/1.73    eGFR African Am 87 >60 mL/min/1.73    BUN/Creatinine Ratio 15.4 7.0 - 25.0    Sodium 140 136 - 145 mmol/L    Potassium 4.6 3.5 - 5.2 mmol/L    Chloride 100 98 - 107 mmol/L    Total CO2 27.6 22.0 - 29.0 mmol/L    Calcium 9.7 8.6 - 10.5 mg/dL    Total Protein 7.2 6.0 - 8.5 g/dL    Albumin 4.60 3.50 - 5.20 g/dL    Globulin 2.6 gm/dL    A/G Ratio 1.8 g/dL    Total Bilirubin 0.7 0.1 - 1.2 mg/dL    Alkaline Phosphatase 32 (L) 39 - 117 U/L    AST (SGOT) 20 1 - 40 U/L    ALT (SGPT) 31 1 - 41 U/L   T4, Free   Result Value Ref Range    Free T4 1.29 0.93 - 1.70 ng/dL   TSH   Result Value Ref Range    TSH 0.875 0.270 - 4.200 mIU/mL   Lipid Panel With LDL / HDL Ratio   Result Value Ref Range    Total Cholesterol 160 0 - 200 mg/dL    Triglycerides 115 0 - 150 mg/dL    HDL Cholesterol 39 (L) 40 - 60 mg/dL    VLDL Cholesterol 23 5 - 40 mg/dL    LDL Cholesterol  98 0 - 100 mg/dL    LDL/HDL Ratio 2.51    Microscopic Examination   Result Value Ref Range    WBC, UA 3-5 (A) /hpf    RBC, UA 0-2 /hpf    Epithelial Cells (non renal) 0-2 /hpf    Cast Type Comment     Bacteria, UA Comment None Seen /hpf   CBC & Differential   Result Value Ref Range    WBC 6.56 4.50 - 10.70 10*3/mm3    RBC 5.43 4.60 - 6.00 10*6/mm3    Hemoglobin 15.6 13.7 - 17.6 g/dL    Hematocrit 47.2 40.4 - 52.2 %    MCV 86.9 79.8 - 96.2 fL    MCH 28.7 27.0 - 32.7 pg    MCHC 33.1 32.6 - 36.4 g/dL    RDW 13.3 11.5 - 14.5 %    Platelets 249 140 - 500 10*3/mm3    Neutrophil Rel % 51.7 42.7 - 76.0 %    Lymphocyte Rel % 34.6 19.6 - 45.3 %    Monocyte Rel % 11.1 5.0 - 12.0 %    Eosinophil Rel % 2.0 0.3 - 6.2 %    Basophil Rel % 0.6 0.0 - 1.5 %    Neutrophils Absolute 3.39 1.90  - 8.10 10*3/mm3    Lymphocytes Absolute 2.27 0.90 - 4.80 10*3/mm3    Monocytes Absolute 0.73 0.20 - 1.20 10*3/mm3    Eosinophils Absolute 0.13 0.00 - 0.70 10*3/mm3    Basophils Absolute 0.04 0.00 - 0.20 10*3/mm3    Immature Granulocyte Rel % 0.0 0.0 - 0.5 %    Immature Grans Absolute 0.00 0.00 - 0.03 10*3/mm3   Urinalysis With Microscopic - Urine, Clean Catch   Result Value Ref Range    Specific Gravity, UA 1.020 1.005 - 1.030    pH, UA 6.0 5.0 - 8.0    Color, UA Yellow     Appearance, UA Clear Clear    Leukocytes, UA See below: (A) Negative    Protein Negative Negative    Glucose, UA Negative Negative    Ketones Negative Negative    Blood, UA Negative Negative    Bilirubin, UA Negative Negative    Urobilinogen, UA Comment     Nitrite, UA Negative Negative         Assessment:   Brian was seen today for diabetes.    Diagnoses and all orders for this visit:    Uncontrolled type 2 diabetes mellitus with complication, without long-term current use of insulin  -     Fructosamine  -     C-Peptide  -     Hemoglobin A1c  -     Insulin, Total  -     Uric Acid  -     Vitamin D 25 Hydroxy  -     TSH  -     Thyroid Antibodies  -     T4, Free  -     T4  -     T3, Free  -     T3  -     Ambulatory Referral to Diabetic Education  -     SITagliptin (JANUVIA) 100 MG tablet; Take 1 tablet by mouth Daily.  -     Exenatide ER (BYDUREON) 2 MG pen-injector; Inject 2 mg under the skin 1 (One) Time for 1 dose.  -     Empagliflozin-Metformin HCl 12.5-1000 MG tablet; Take 12.5 mg by mouth 2 (Two) Times a Day.  -     Comprehensive Metabolic Panel; Future  -     C-Peptide; Future  -     Hemoglobin A1c; Future  -     Insulin, Total; Future  -     Microalbumin / Creatinine Urine Ratio - Urine, Clean Catch; Future  -     Uric Acid; Future  -     T3, Free; Future  -     T4, Free; Future  -     TSH; Future    Mixed hyperlipidemia  -     Fructosamine  -     C-Peptide  -     Hemoglobin A1c  -     Insulin, Total  -     Uric Acid  -     Vitamin D 25  Hydroxy  -     TSH  -     Thyroid Antibodies  -     T4, Free  -     T4  -     T3, Free  -     T3  -     Comprehensive Metabolic Panel; Future  -     Lipid Panel; Future    Vitamin D deficiency  -     Fructosamine  -     C-Peptide  -     Hemoglobin A1c  -     Insulin, Total  -     Uric Acid  -     Vitamin D 25 Hydroxy  -     TSH  -     Thyroid Antibodies  -     T4, Free  -     T4  -     T3, Free  -     T3  -     Comprehensive Metabolic Panel; Future  -     Vitamin D 25 Hydroxy; Future    Chronic fatigue  -     Fructosamine  -     C-Peptide  -     Hemoglobin A1c  -     Insulin, Total  -     Uric Acid  -     Vitamin D 25 Hydroxy  -     TSH  -     Thyroid Antibodies  -     T4, Free  -     T4  -     T3, Free  -     T3  -     Comprehensive Metabolic Panel; Future          Plan:    In Summary: Brian Garcia who presents for consult/evaluation per request of Ludwin Reyna MD for Type 2 diabetes mellitus insulin dependent. The initial diagnosis of diabetes was made 16-17 years ago.Patient reports upon getting diagnosed he went on a fishing trip to the country became very dizzy syncopal past out.  Went to his primary care.  Physical got diagnosed very marginal level placed on Actos.  Took the medication for one week had episodes of diaphoretic felt bad was checking blood glucose was running between 47 and 60 mg/[place] took himself off the medication and maintain blood glucose for years.  He reports that his blood glucose waxed and waned and he was put on several different types of medications throughout the years. The condition of diabetes and clinical course has fluctuated with the severity has worsened. The modifying/allievating factors are  oral medications. Insulin dosage review with Brian SHAY Jose suggested compliance most of the time. The patient reports associated symptoms of hyperglycemia and hypoglycemia have been none, with his  symptoms is n/a mg/dl . The patient is currently taking home blood tests - Blood glucose  testing: 3-4 times daily, that are: FB to 237 mg/dl, before lunch 160 to 180 mg/dl, after lunch 195 to 264 mg/dl, and  after dinner/supper 129 to 269 mg/dl.  The   patient's history were reviewed and updated as appropriate: current medications, past family history, past medical history, past social history, past surgical history and problem list. Medical records, chart, and labs reviewed from primary care provider.  Maintenance was reviewed: Last eye exam May 2017, and last foot exam was per primary care in 2017.  Vaccinations-does not believe in the flu vaccination has not had the pneumonia vaccination he did take the shingles vaccination.  This provider advised him strongly to get the pneumonia vaccination he did say that he would consider getting this vaccination but was not interested in the flu vaccination.  He reported that he was placed on Invokana in the past after 2 weeks of taken and he developed a floater in the right eye he went to his optometrist had a procedure he came off of the medication was placed back on it developed another floater in the left eye.  At this time he did say that he would attempt another type of the medication in the same class.  In the past also he is taking metformin has reported some GI effects he has not taken in combination with another medication he did state that he would try this type of medication also.  Labs evaluated was 2017 at this time additional lab work will be obtained and treated as indicated with results.  The medication changes today were as follows:      Synjardy 12.5/1000mg - 1 by mouth twice daily-with this medication take over-the-counter vitamin C 500 mg and continue vitamin D as previously prescribed increase water at least 4 bottles daily especially the first 3-4 weeks while starting this medication (can start one by mouth for 1 week and then add second dose and titrate up to twice daily    Start Bydureon 2 mg injection one  weekly    Continue Januvia 100 mg once in the morning     additional instructions with a new glucometer:    home blood tests -  Blood glucose testing: 3 times daily, that are:  fasting- 1st thing in morning before eating or drinking  before each meal and 1 or 2 hours after meal  bedtime  anytime you feel symptoms of hyperglycemia or hypoglycemia (high or low blood sugars)      Education:  referred to Diabetes Educator, interpretation of lab results, blood sugar goals, complications of diabetes mellitus, hypoglycemia prevention and treatment, exercise, illness management, self-monitoring of blood glucose skills, nutrition and carbohydrate counting        Return in about 3 months (around 3/21/2018), or if symptoms worsen or fail to improve, for Recheck. 3 months with Harriet-1 week prior for labs 6 months with Dr. Lindsey-one week prior for labs          Dragon transcription disclaimer     Much of this encounter note is an electronic transcription/translation of spoken language to printed text. The electronic translation of spoken language may permit erroneous, or at times, nonsensical words or phrases to be inadvertently transcribed. Although I have reviewed the note for such errors, some may still exist.

## 2017-12-22 LAB
25(OH)D3+25(OH)D2 SERPL-MCNC: 36.5 NG/ML (ref 30–100)
C PEPTIDE SERPL-MCNC: 3 NG/ML (ref 1.1–4.4)
FRUCTOSAMINE SERPL-SCNC: 361 UMOL/L (ref 0–285)
HBA1C MFR BLD: 8.6 % (ref 4.8–5.6)
INSULIN SERPL-ACNC: 9.5 UIU/ML (ref 2.6–24.9)
T3 SERPL-MCNC: 124.2 NG/DL (ref 80–200)
T3FREE SERPL-MCNC: 3.4 PG/ML (ref 2–4.4)
T4 FREE SERPL-MCNC: 1.29 NG/DL (ref 0.93–1.7)
T4 SERPL-MCNC: 8.38 MCG/DL (ref 4.5–11.7)
THYROGLOB AB SERPL-ACNC: <1 IU/ML (ref 0–0.9)
THYROPEROXIDASE AB SERPL-ACNC: 12 IU/ML (ref 0–34)
TSH SERPL DL<=0.005 MIU/L-ACNC: 1.23 MIU/ML (ref 0.27–4.2)
URATE SERPL-MCNC: 5.9 MG/DL (ref 3.4–7)

## 2017-12-29 ENCOUNTER — TELEPHONE (OUTPATIENT)
Dept: ENDOCRINOLOGY | Age: 67
End: 2017-12-29

## 2017-12-29 NOTE — TELEPHONE ENCOUNTER
----- Message from Paula Rodríguez MA sent at 12/26/2017  2:30 PM EST -----  Pt called today stating that he was put on synjardy at last visit and it seems to be doing well. He mentioned in his message that he was placed on an injection and is concerned with taking synjardy 2 times a day when his BG is running between 112-114. Please advise

## 2018-01-02 RX ORDER — ERGOCALCIFEROL 1.25 MG/1
CAPSULE ORAL
Qty: 12 CAPSULE | Refills: 1 | Status: SHIPPED | OUTPATIENT
Start: 2018-01-02 | End: 2019-01-03 | Stop reason: SDUPTHER

## 2018-02-12 ENCOUNTER — HOSPITAL ENCOUNTER (OUTPATIENT)
Dept: DIABETES SERVICES | Facility: HOSPITAL | Age: 68
Discharge: HOME | End: 2018-02-12
Admitting: NURSE PRACTITIONER

## 2018-02-12 PROCEDURE — G0109 DIAB MANAGE TRN IND/GROUP: HCPCS

## 2018-03-21 ENCOUNTER — RESULTS ENCOUNTER (OUTPATIENT)
Dept: ENDOCRINOLOGY | Age: 68
End: 2018-03-21

## 2018-03-21 DIAGNOSIS — R53.82 CHRONIC FATIGUE: ICD-10-CM

## 2018-03-21 DIAGNOSIS — E55.9 VITAMIN D DEFICIENCY: ICD-10-CM

## 2018-03-21 DIAGNOSIS — E78.2 MIXED HYPERLIPIDEMIA: ICD-10-CM

## 2018-03-21 DIAGNOSIS — IMO0002 UNCONTROLLED TYPE 2 DIABETES MELLITUS WITH COMPLICATION, WITHOUT LONG-TERM CURRENT USE OF INSULIN: ICD-10-CM

## 2018-03-26 ENCOUNTER — LAB (OUTPATIENT)
Dept: ENDOCRINOLOGY | Age: 68
End: 2018-03-26

## 2018-03-26 DIAGNOSIS — IMO0002 UNCONTROLLED TYPE 2 DIABETES MELLITUS WITH COMPLICATION, WITHOUT LONG-TERM CURRENT USE OF INSULIN: ICD-10-CM

## 2018-03-27 LAB
25(OH)D3+25(OH)D2 SERPL-MCNC: 39.4 NG/ML (ref 30–100)
ALBUMIN SERPL-MCNC: 4.7 G/DL (ref 3.5–5.2)
ALBUMIN/GLOB SERPL: 2 G/DL
ALP SERPL-CCNC: 26 U/L (ref 39–117)
ALT SERPL-CCNC: 23 U/L (ref 1–41)
AST SERPL-CCNC: 16 U/L (ref 1–40)
BILIRUB SERPL-MCNC: 0.5 MG/DL (ref 0.1–1.2)
BUN SERPL-MCNC: 21 MG/DL (ref 8–23)
BUN/CREAT SERPL: 21 (ref 7–25)
C PEPTIDE SERPL-MCNC: 1.4 NG/ML (ref 1.1–4.4)
CALCIUM SERPL-MCNC: 9.8 MG/DL (ref 8.6–10.5)
CHLORIDE SERPL-SCNC: 99 MMOL/L (ref 98–107)
CHOLEST SERPL-MCNC: 186 MG/DL (ref 0–200)
CO2 SERPL-SCNC: 26.6 MMOL/L (ref 22–29)
CREAT SERPL-MCNC: 1 MG/DL (ref 0.76–1.27)
GFR SERPLBLD CREATININE-BSD FMLA CKD-EPI: 75 ML/MIN/1.73
GFR SERPLBLD CREATININE-BSD FMLA CKD-EPI: 91 ML/MIN/1.73
GLOBULIN SER CALC-MCNC: 2.4 GM/DL
GLUCOSE SERPL-MCNC: 113 MG/DL (ref 65–99)
HBA1C MFR BLD: 6.3 % (ref 4.8–5.6)
HDLC SERPL-MCNC: 34 MG/DL (ref 40–60)
INSULIN SERPL-ACNC: 3 UIU/ML (ref 2.6–24.9)
INTERPRETATION: NORMAL
LDLC SERPL CALC-MCNC: 127 MG/DL (ref 0–100)
Lab: NORMAL
POTASSIUM SERPL-SCNC: 4.6 MMOL/L (ref 3.5–5.2)
PROT SERPL-MCNC: 7.1 G/DL (ref 6–8.5)
SODIUM SERPL-SCNC: 139 MMOL/L (ref 136–145)
T3FREE SERPL-MCNC: 3 PG/ML (ref 2–4.4)
T4 FREE SERPL-MCNC: 1.36 NG/DL (ref 0.93–1.7)
TRIGL SERPL-MCNC: 127 MG/DL (ref 0–150)
TSH SERPL DL<=0.005 MIU/L-ACNC: 1.21 MIU/ML (ref 0.27–4.2)
UNABLE TO VOID: NORMAL
URATE SERPL-MCNC: 5.4 MG/DL (ref 3.4–7)
VLDLC SERPL CALC-MCNC: 25.4 MG/DL (ref 5–40)

## 2018-04-13 ENCOUNTER — OFFICE VISIT (OUTPATIENT)
Dept: ENDOCRINOLOGY | Age: 68
End: 2018-04-13

## 2018-04-13 VITALS
SYSTOLIC BLOOD PRESSURE: 118 MMHG | WEIGHT: 170 LBS | HEIGHT: 64 IN | BODY MASS INDEX: 29.02 KG/M2 | DIASTOLIC BLOOD PRESSURE: 70 MMHG

## 2018-04-13 DIAGNOSIS — Z79.899 MEDICATION MANAGEMENT: ICD-10-CM

## 2018-04-13 DIAGNOSIS — N42.9 DISORDER OF PROSTATE: ICD-10-CM

## 2018-04-13 DIAGNOSIS — E29.1 MALE HYPOGONADISM: ICD-10-CM

## 2018-04-13 DIAGNOSIS — E67.3 HYPERVITAMINOSIS D: ICD-10-CM

## 2018-04-13 DIAGNOSIS — IMO0002 UNCONTROLLED TYPE 2 DIABETES MELLITUS WITH COMPLICATION, WITHOUT LONG-TERM CURRENT USE OF INSULIN: Primary | ICD-10-CM

## 2018-04-13 DIAGNOSIS — E78.2 MIXED HYPERLIPIDEMIA: ICD-10-CM

## 2018-04-13 PROCEDURE — 99214 OFFICE O/P EST MOD 30 MIN: CPT | Performed by: NURSE PRACTITIONER

## 2018-04-13 RX ORDER — TESTOSTERONE CYPIONATE 200 MG/ML
100 INJECTION, SOLUTION INTRAMUSCULAR
Qty: 4 ML | Refills: 0 | Status: SHIPPED | OUTPATIENT
Start: 2018-04-13 | End: 2018-07-18 | Stop reason: SDUPTHER

## 2018-04-13 RX ORDER — EMPAGLIFLOZIN 25 MG/1
25 TABLET, FILM COATED ORAL EVERY MORNING
Qty: 30 TABLET | Refills: 3 | Status: SHIPPED | OUTPATIENT
Start: 2018-04-13 | End: 2018-07-18 | Stop reason: SDUPTHER

## 2018-04-13 NOTE — PROGRESS NOTES
Brian Garcia  presents to the office  for the follow-up appointment for Type 2 diabetes mellitus.     The diabete's condition location is throughout the system with the  clinical course has fluctuated, the severity is Mild, and the modifying/allievating factors are oral medications.  Medications and  Dosages were  reviewed with Brian Garcia and suggested that compliance most of the time.    The patient reports associated symptoms of hyperglycemia have been none and associated symptoms of hypoglycemia have been none, with their  hypoglycemia threshold for symptoms is n/a mg/dl .     The patient is currently on oral medications .      Compliance with blood glucose monitoring: good.     Meal panning: The patient is using avoidance of concentrated sweets.    The patient is currently taking home blood tests - Blood glucose testin-3 times daily, that are:  fasting- 1st thing in morning before eating or drinking  after lunch and supper    Last instructions given were:  Synjardy 12.5/1000mg - 1 by mouth twice daily-with this medication take over-the-counter vitamin C 500 mg and continue vitamin D as previously prescribed increase water at least 4 bottles daily especially the first 3-4 weeks while starting this medication (can start one by mouth for 1 week and then add second dose and titrate up to twice daily     Start Bydureon 2 mg injection one weekly     Continue Januvia 100 mg once in the morning    Home blood glucose testing daily: 2-3  FB to 110 mg/dl  after lunch 120 to 150 mg/dl   after dinner/supper 90 to 130 mg/dl    Last reported blood glucose readings are:  Home blood glucose testing daily: 3-4  FB to 237 mg/dl  before lunch 160 to 180 mg/dl  after lunch 195 to 264 mg/dl   after dinner/supper 129 to 269 mg/dl    Patterns reported per patient are none. There are 3 times with low blood glucose at 75mg/dl- normally will take a peppermint and one time with hyperglycemia - 188mg/dl after eating at  "[employer].         The following portions of the patient's history were reviewed and updated as appropriate: current medications, past family history, past medical history, past social history, past surgical history and problem list.      Current Outpatient Prescriptions:   •  ACCU-CHEK FASTCLIX LANCETS misc, Test 4 times daily, Disp: 204 each, Rfl: 5  •  aspirin 81 MG tablet, Take 81 mg by mouth daily., Disp: , Rfl:   •  Blood Glucose Monitoring Suppl (ACCU-CHEK ROMAN) device, Use as instructed, Disp: 1 each, Rfl: 0  •  cetirizine (zyrTEC) 10 MG tablet, Take 1 tablet by mouth Daily., Disp: 30 tablet, Rfl: 2  •  doxycycline (VIBRAMYICN) 100 MG tablet, Take 1 tablet by mouth 2 (Two) Times a Day. For bee sting and cellulitis from sting, Disp: 20 tablet, Rfl: 1  •  fluticasone (FLONASE) 50 MCG/ACT nasal spray, 1 spray into each nostril 2 (two) times a day., Disp: , Rfl:   •  fluticasone (VERAMYST) 27.5 MCG/SPRAY nasal spray, 2 sprays into each nostril Daily., Disp: , Rfl:   •  glucose blood (ACCU-CHEK ROMAN) test strip, Test 4 times daily, Disp: 125 each, Rfl: 5  •  SITagliptin (JANUVIA) 100 MG tablet, Take 1 tablet by mouth Daily., Disp: 90 tablet, Rfl: 1  •  tadalafil (CIALIS) 5 MG tablet, Take 1 daily for BPH, Disp: 30 tablet, Rfl: 5  •  vitamin D (ERGOCALCIFEROL) 59596 units capsule capsule, TAKE ONE CAPSULE BY MOUTH ONCE A MONTH, Disp: 12 capsule, Rfl: 1  •  JARDIANCE 25 MG tablet, Take 25 mg by mouth Every Morning. Take 1 by mouth every day in the morning, Disp: 30 tablet, Rfl: 3  •  Needle, Disp, 22G X 1\" misc, 1 inj weekly, Disp: 12 each, Rfl: 4  •  Syringe 18G X 1-1/2\" 3 ML misc, 1 inj every week, Disp: 12 each, Rfl: 4  •  Testosterone Cypionate (DEPO-TESTOSTERONE) 200 MG/ML injection, Inject 0.5 mL into the shoulder, thigh, or buttocks Every 14 (Fourteen) Days., Disp: 4 mL, Rfl: 0    Patient Active Problem List    Diagnosis   • Uncontrolled type 2 diabetes mellitus with complication, without long-term current " "use of insulin [E11.8, E11.65]   • Mixed hyperlipidemia [E78.2]   • Vitamin D deficiency [E55.9]   • Welcome to Medicare preventive visit [Z00.00]   • Acute bronchitis [J20.9]   • Acute pharyngitis [J02.9]   • Acute upper respiratory infection [J06.9]   • Allergic rhinitis [J30.9]   • Carpal tunnel syndrome [G56.00]   • Cough [R05]   • Alimentary obesity [E66.9]   • Fatigue [R53.83]   • Effusion of knee [M25.469]   • Gonalgia [M25.569]   • ED (erectile dysfunction) of organic origin [N52.9]   • Muscle ache [M79.1]   • Thumb pain [M79.646]       Review of Systems   A comprehensive review of the 14 systems was negative except of listed below:  Constitutional: fatigue  Gastrointestinal: positive for abdominal pain, dyspepsia and reflux symptoms  Genitourinary:positive for Muscle strength endurance changes and mental fatigue has been tested before for hypogonadism and low to testosterone     Objective:     Wt Readings from Last 3 Encounters:   04/13/18 77.1 kg (170 lb)   12/21/17 85 kg (187 lb 8 oz)   12/04/17 83 kg (183 lb)     Temp Readings from Last 3 Encounters:   12/04/17 98.1 °F (36.7 °C) (Oral)   06/05/17 98.3 °F (36.8 °C) (Oral)   05/16/17 96 °F (35.6 °C) (Tympanic)     BP Readings from Last 3 Encounters:   04/13/18 118/70   12/21/17 124/82   12/04/17 125/78     Pulse Readings from Last 3 Encounters:   12/04/17 66   06/05/17 71   05/16/17 69        /70   Ht 162.6 cm (64.02\")   Wt 77.1 kg (170 lb)   BMI 29.17 kg/m²     General appearance:  alert, cooperative, delirious, fatigued, nourished\" \"appears stated age and cooperative\" \"NAD   Neck: no carotid bruit, supple, symmetrical, trachea midline and thyroid not enlarged, symmetric, no tenderness/mass/nodules   Thyroid:  no palpable nodule, no enlargement, no tenderness   Lung:  \"clear to auscultation bilaterally\" \"no abnormal breath sounds  \" effort was normal, no labored breath, no use of accessory muscles.   Heart: regular rate and rhythm, S1, S2 normal, " "no murmur, click, rub or gallop      Abdomen:  normal bowel sounds- 4 quads, soft non-tender and contour - slt rounded      Extremities: extremities normal, atraumatic, no cyanosis or edema extremities normal, atraumatic, no cyanosis or edema\" WNL - gait and station, strength and stability\"       Skin:   Pulses:  warm and dry, no hyperpigmentation, normal skin coloring, or suspicious lesions   2+ and symmetric   Neuro: Alert and oriented x3. Gait normal. Reflexes and motor strength normal and symmetric. Cranial nerves 2-12 and sensation grossly intact.      Psych: behavior - normal, judgement - normal, mood - normal, affect - normal                 Lab Review  Results for orders placed or performed in visit on 03/21/18   Comprehensive Metabolic Panel   Result Value Ref Range    Glucose 113 (H) 65 - 99 mg/dL    BUN 21 8 - 23 mg/dL    Creatinine 1.00 0.76 - 1.27 mg/dL    eGFR Non African Am 75 >60 mL/min/1.73    eGFR African Am 91 >60 mL/min/1.73    BUN/Creatinine Ratio 21.0 7.0 - 25.0    Sodium 139 136 - 145 mmol/L    Potassium 4.6 3.5 - 5.2 mmol/L    Chloride 99 98 - 107 mmol/L    Total CO2 26.6 22.0 - 29.0 mmol/L    Calcium 9.8 8.6 - 10.5 mg/dL    Total Protein 7.1 6.0 - 8.5 g/dL    Albumin 4.70 3.50 - 5.20 g/dL    Globulin 2.4 gm/dL    A/G Ratio 2.0 g/dL    Total Bilirubin 0.5 0.1 - 1.2 mg/dL    Alkaline Phosphatase 26 (L) 39 - 117 U/L    AST (SGOT) 16 1 - 40 U/L    ALT (SGPT) 23 1 - 41 U/L   C-Peptide   Result Value Ref Range    C-Peptide 1.4 1.1 - 4.4 ng/mL   Hemoglobin A1c   Result Value Ref Range    Hemoglobin A1C 6.30 (H) 4.80 - 5.60 %   Insulin, Total   Result Value Ref Range    Insulin 3.0 2.6 - 24.9 uIU/mL   Lipid Panel   Result Value Ref Range    Total Cholesterol 186 0 - 200 mg/dL    Triglycerides 127 0 - 150 mg/dL    HDL Cholesterol 34 (L) 40 - 60 mg/dL    VLDL Cholesterol 25.4 5 - 40 mg/dL    LDL Cholesterol  127 (H) 0 - 100 mg/dL   Uric Acid   Result Value Ref Range    Uric Acid 5.4 3.4 - 7.0 mg/dL " "  Vitamin D 25 Hydroxy   Result Value Ref Range    25 Hydroxy, Vitamin D 39.4 30.0 - 100.0 ng/ml   T3, Free   Result Value Ref Range    T3, Free 3.0 2.0 - 4.4 pg/mL   T4, Free   Result Value Ref Range    Free T4 1.36 0.93 - 1.70 ng/dL   TSH   Result Value Ref Range    TSH 1.210 0.270 - 4.200 mIU/mL   Unable To Void   Result Value Ref Range    Unable to Void Comment    Cardiovascular Risk Assessment   Result Value Ref Range    Interpretation Note    Diabetes Patient Education   Result Value Ref Range    PDF Image Not applicable            Assessment:   Brian was seen today for follow-up.    Diagnoses and all orders for this visit:    Uncontrolled type 2 diabetes mellitus with complication, without long-term current use of insulin  -     JARDIANCE 25 MG tablet; Take 25 mg by mouth Every Morning. Take 1 by mouth every day in the morning  -     C-Peptide; Future  -     Comprehensive Metabolic Panel; Future  -     CBC & Differential; Future  -     Hemoglobin A1c; Future  -     Insulin, Total; Future  -     Lipid Panel; Future  -     Microalbumin / Creatinine Urine Ratio - Urine, Clean Catch; Future  -     Uric Acid; Future  -     Vitamin D 25 Hydroxy; Future  -     TSH; Future  -     Thyroid Antibodies; Future  -     T4, Free; Future  -     T3, Free; Future  -     Testosterone, Free, Total; Future  -     Testosterone; Future    Male hypogonadism  -     Testosterone Cypionate (DEPO-TESTOSTERONE) 200 MG/ML injection; Inject 0.5 mL into the shoulder, thigh, or buttocks Every 14 (Fourteen) Days.  -     Testosterone, Free, Total; Future  -     Testosterone; Future  -     Luteinizing Hormone; Future  -     Follicle Stimulating Hormone; Future  -     Sex Horm Binding Globulin; Future  -     PSA Total, Reflex To Free; Future  -     CBC (No Diff); Future  -     Syringe 18G X 1-1/2\" 3 ML misc; 1 inj every week  -     Needle, Disp, 22G X 1\" misc; 1 inj weekly  -     Comprehensive Metabolic Panel; Future  -     CBC & Differential; " "Future  -     Testosterone, Free, Total; Future  -     Testosterone; Future    Medication management  -     Testosterone, Free, Total; Future  -     Testosterone; Future  -     Luteinizing Hormone; Future  -     Follicle Stimulating Hormone; Future  -     Sex Horm Binding Globulin; Future  -     PSA Total, Reflex To Free; Future  -     CBC (No Diff); Future  -     Comprehensive Metabolic Panel; Future  -     CBC & Differential; Future    Disorder of prostate   -     PSA Total, Reflex To Free; Future  -     Comprehensive Metabolic Panel; Future  -     CBC & Differential; Future    Mixed hyperlipidemia  -     Comprehensive Metabolic Panel; Future  -     CBC & Differential; Future  -     Lipid Panel; Future    Hypervitaminosis D   -     Vitamin D 25 Hydroxy; Future          Plan:   In summary:  I met with the patient is metabolically stable and doing well.  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  He states that is not bothersome.  Reviewed lab work prior obtained to the visit.  Upon discharge from the visit, patient started to report symptoms of dyspepsia and flatus due to the metformin component with Synjardy.  Patient at this time request to be taken off all metformin formulary's.  Change medication to Jardiance only 25 mg in the morning.  In addition he reported that his urologist recently has checked his to testosterone level.  He has been treated for hypogonadism in the past.  Urologist has cleared him for treatment due to status post prostate cancer.  His primary care has informed him to \"be careful with testosterone treatment\".  This provider due to symptoms of extreme fatigue, cognitive changes and muscle strength endurance change reviewed lab work from urologist and started patient on to testosterone injections 200 mg per mL-to take one ML every 14 " days and we will recheck labs in 6 weeks.  Instructions for Follow-up were given with labs prior to that appointment.        Medication changes:     Start testosterone inj 1ml every 14 days  Lab check in 6 weeks.     Stop The Synjardy    Start Jardiance 25 mg once in the morning    home blood tests -  Blood glucose testin times daily, that are:  fasting- 1st thing in morning before eating or drinking  before each meal and 1 or 2 hours after meal  bedtime  anytime you feel symptoms of hyperglycemia or hypoglycemia (high or low blood sugars)        Education:  interpretation of lab results, blood sugar goals, complications of diabetes mellitus, hypoglycemia prevention and treatment, exercise, illness management, self-monitoring of blood glucose skills, nutrition and carbohydrate counting        The total face to face time spent was  25 minutes  with additional education given: 14 minutes (greater than 50% of the total time) was spent with counseling and coordination of care on: SMBG with goals, Side effects profiles with medications, medication use and purposes, and the changing of medication from Synjardy to Jardiance.  The start of to testosterone injections.    Return in about 3 months (around 2018), or if symptoms worsen or fail to improve, for Recheck. 6 weeks labs only, 3 Months with Harriet-2 weeks prior for labs, 6 months with Dr. Lindsey-2 weeks prior for labs        Dragon transcription disclaimer     Much of this encounter note is an electronic transcription/translation of spoken language to printed text. The electronic translation of spoken language may permit erroneous, or at times, nonsensical words or phrases to be inadvertently transcribed. Although I have reviewed the note for such errors, some may still exist.

## 2018-04-13 NOTE — PATIENT INSTRUCTIONS
Start testosterone inj 1ml every 14 days  Lab check in 6 weeks.     Stop The Synjardy    Start Jardiance 25 mg once in the morning

## 2018-04-19 ENCOUNTER — OFFICE VISIT (OUTPATIENT)
Dept: INTERNAL MEDICINE | Facility: CLINIC | Age: 68
End: 2018-04-19

## 2018-04-19 VITALS
HEIGHT: 64 IN | HEART RATE: 67 BPM | BODY MASS INDEX: 28.85 KG/M2 | RESPIRATION RATE: 16 BRPM | SYSTOLIC BLOOD PRESSURE: 123 MMHG | DIASTOLIC BLOOD PRESSURE: 78 MMHG | WEIGHT: 169 LBS | TEMPERATURE: 97.4 F | OXYGEN SATURATION: 97 %

## 2018-04-19 DIAGNOSIS — E78.2 MIXED HYPERLIPIDEMIA: Primary | ICD-10-CM

## 2018-04-19 DIAGNOSIS — E55.9 VITAMIN D DEFICIENCY: ICD-10-CM

## 2018-04-19 DIAGNOSIS — Z53.20 REFUSAL OF STATIN MEDICATION BY PATIENT: ICD-10-CM

## 2018-04-19 DIAGNOSIS — E11.9 NON-INSULIN DEPENDENT TYPE 2 DIABETES MELLITUS (HCC): ICD-10-CM

## 2018-04-19 DIAGNOSIS — J30.1 CHRONIC SEASONAL ALLERGIC RHINITIS DUE TO POLLEN: ICD-10-CM

## 2018-04-19 DIAGNOSIS — E66.9 ALIMENTARY OBESITY: ICD-10-CM

## 2018-04-19 PROCEDURE — 99214 OFFICE O/P EST MOD 30 MIN: CPT | Performed by: INTERNAL MEDICINE

## 2018-04-19 RX ORDER — FLUTICASONE PROPIONATE 50 MCG
2 SPRAY, SUSPENSION (ML) NASAL DAILY
Qty: 5 BOTTLE | Refills: 5 | Status: SHIPPED | OUTPATIENT
Start: 2018-04-19 | End: 2020-01-27

## 2018-04-30 ENCOUNTER — TELEPHONE (OUTPATIENT)
Dept: ENDOCRINOLOGY | Age: 68
End: 2018-04-30

## 2018-04-30 NOTE — TELEPHONE ENCOUNTER
----- Message from LUX Hong sent at 4/26/2018  9:52 PM EDT -----  Contact: PATIENT  I'm not sure exactly what this patient is wanting.  I can go back on the Synjardy 12.5/1000 mg twice daily it was controlling his blood glucose well.  ----- Message -----  From: Mikaela Briggs MA  Sent: 4/26/2018   4:37 PM  To: LUX Hong     Please advise.     ----- Message -----  From: Jaswinder Silverio  Sent: 4/25/2018   4:15 PM  To: Mikaela Briggs MA    PATIENT STATED THAT HE HAD ASKED TO JACQUELYN TO TAKE HIM OFF OF THE Empagliflozin-Metformin HCl 12.5-1000, PATIENT SAID THAT HIS BLOOD SUGAR NUMBERS HAVE BEEN GOING UP IN THE AFTER NOON AND HE SUPPOSES THAT IT WAS THE COMBINATION OF Empagliflozin-Metformin HCl 12.5-1000 THAT WAS KEEPING HIS NUMBERS DOWN.        PATIENT SAID IF HIS NUMBERS BEING ELEVATED IF IT IS GOING TO AFFECT HIS A1C,     PATIENT SAID THAT HE EATS PRETTY MUCH THE SAME SO HE COULD SEE IF THE JARDIANCE 25 MG tablet WAS GOING TO HAVE THE SAME AFFECT AS THE OTHER MEDICATION. 2 HOURS AFTER HE EATS HIS NUMBERS ARE FROM LIKE 130 . IT COMES DOWN OVER NIGHT AND EVERYTHING IS THE SAME.    PATIENT IS CONCERNED THAT HIS A1C IS GOING TO GO UP AND HE DOES NOT WANT THAT.      PATIENT WOULD LIKE TO BE ADVISED ON WHAT TO Do. HE WOULD LIKE TO KNOW WHAT TO DO BEFORE HIS NEXT APPT. HE DIDN'T WANT HIS NUMBERS TO GET OUT OF HAND    PATIENT WOULD LIKE A CALL BACK BEST # [phone] THIS IS CELL PHONE CAN BE REACHED MOST TIMES UNLESS ON SALES CALL.

## 2018-05-02 NOTE — PROGRESS NOTES
Subjective   Brian Garcia is a 67 y.o. male.   He is here to follow-up for hyperlipidemia vitamin D deficiency obesity NIDDM chronic allergic rhinitis and refusal of statin medication by patient and he has no new complaints  History of Present Illness   He is here to follow-up for hyperlipidemia which hopefully stable on diet and exercise as he refuses statin medication and vitamin D deficiency which has been stable on supplementation and NIDDM which hopefully is getting better and has been recently and chronic allergic rhinitis which is requiring Flonase nasal sprays been stable on that and refusal statin medication by the patient  The following portions of the patient's history were reviewed and updated as appropriate: allergies, current medications, past family history, past medical history, past social history, past surgical history and problem list.    Review of Systems   All other systems reviewed and are negative.      Objective   Physical Exam   Constitutional: He is oriented to person, place, and time. He appears well-developed and well-nourished. He is cooperative.   HENT:   Head: Normocephalic and atraumatic.   Right Ear: Hearing, tympanic membrane, external ear and ear canal normal.   Left Ear: Hearing, tympanic membrane, external ear and ear canal normal.   Nose: Nose normal.   Mouth/Throat: Uvula is midline, oropharynx is clear and moist and mucous membranes are normal.   Eyes: Conjunctivae, EOM and lids are normal. Pupils are equal, round, and reactive to light.   Neck: Phonation normal. Neck supple. Carotid bruit is not present.   Cardiovascular: Normal rate, regular rhythm and normal heart sounds.  Exam reveals no gallop and no friction rub.    No murmur heard.  Pulmonary/Chest: Effort normal and breath sounds normal. No respiratory distress.   Abdominal: Soft. Bowel sounds are normal. He exhibits no distension and no mass. There is no hepatosplenomegaly. There is no tenderness. There is no rebound  and no guarding. No hernia.   Musculoskeletal: He exhibits no edema.   Neurological: He is alert and oriented to person, place, and time. Coordination and gait normal.   Skin: Skin is warm and dry.   Psychiatric: He has a normal mood and affect. His speech is normal and behavior is normal. Judgment and thought content normal.   Nursing note and vitals reviewed.      Assessment/Plan   Diagnoses and all orders for this visit:    Mixed hyperlipidemia    Vitamin D deficiency    Alimentary obesity    Non-insulin dependent type 2 diabetes mellitus    Chronic seasonal allergic rhinitis due to pollen    Refusal of statin medication by patient    Other orders  -     fluticasone (FLONASE) 50 MCG/ACT nasal spray; 2 sprays into each nostril Daily.      Mixed hyperlipidemia hopefully stable on diet and exercise as he does not want statin medication  Vitamin D deficiency has been stable supplementation and we will check levels on a regular basis  Obesity getting gradually better which is good news for him  NIDDM hopefully getting better as well and last time it was not getting better and we will check labs again and hopefully with weight loss and exercise and proper diet he will improve this  Chronic seasonal allergic rhinitis due to pollen stable on current medication and utilize Flonase which has been ordered by me  Refusal of statin medication by patient noted

## 2018-05-25 ENCOUNTER — RESULTS ENCOUNTER (OUTPATIENT)
Dept: ENDOCRINOLOGY | Age: 68
End: 2018-05-25

## 2018-05-25 DIAGNOSIS — Z79.899 MEDICATION MANAGEMENT: ICD-10-CM

## 2018-05-25 DIAGNOSIS — E29.1 MALE HYPOGONADISM: ICD-10-CM

## 2018-05-25 DIAGNOSIS — N42.9 DISORDER OF PROSTATE: ICD-10-CM

## 2018-06-08 ENCOUNTER — HOSPITAL ENCOUNTER (EMERGENCY)
Facility: HOSPITAL | Age: 68
Discharge: HOME | End: 2018-06-08
Attending: EMERGENCY MEDICINE | Admitting: EMERGENCY MEDICINE

## 2018-06-08 ENCOUNTER — APPOINTMENT (OUTPATIENT)
Dept: GENERAL RADIOLOGY | Facility: HOSPITAL | Age: 68
End: 2018-06-08

## 2018-06-08 VITALS
HEART RATE: 75 BPM | TEMPERATURE: 98 F | RESPIRATION RATE: 16 BRPM | DIASTOLIC BLOOD PRESSURE: 78 MMHG | SYSTOLIC BLOOD PRESSURE: 128 MMHG | WEIGHT: 165 LBS | BODY MASS INDEX: 30.36 KG/M2 | HEIGHT: 62 IN | OXYGEN SATURATION: 93 %

## 2018-06-08 DIAGNOSIS — J20.4 ACUTE BRONCHITIS DUE TO PARAINFLUENZA VIRUS: Primary | ICD-10-CM

## 2018-06-08 LAB
ANION GAP SERPL CALCULATED.3IONS-SCNC: 13.3 MMOL/L
B PERT DNA SPEC QL NAA+PROBE: NOT DETECTED
BASOPHILS # BLD AUTO: 0.05 10*3/MM3 (ref 0–0.2)
BASOPHILS NFR BLD AUTO: 0.4 % (ref 0–1.5)
BUN BLD-MCNC: 25 MG/DL (ref 8–23)
BUN/CREAT SERPL: 19.4 (ref 7–25)
C PNEUM DNA NPH QL NAA+NON-PROBE: NOT DETECTED
CALCIUM SPEC-SCNC: 9.1 MG/DL (ref 8.6–10.5)
CHLORIDE SERPL-SCNC: 101 MMOL/L (ref 98–107)
CO2 SERPL-SCNC: 25.7 MMOL/L (ref 22–29)
CREAT BLD-MCNC: 1.29 MG/DL (ref 0.76–1.27)
DEPRECATED RDW RBC AUTO: 45.7 FL (ref 37–54)
EOSINOPHIL # BLD AUTO: 0.1 10*3/MM3 (ref 0–0.7)
EOSINOPHIL NFR BLD AUTO: 0.9 % (ref 0.3–6.2)
ERYTHROCYTE [DISTWIDTH] IN BLOOD BY AUTOMATED COUNT: 14.4 % (ref 11.5–14.5)
FLUAV H1 2009 PAND RNA NPH QL NAA+PROBE: NOT DETECTED
FLUAV H1 HA GENE NPH QL NAA+PROBE: NOT DETECTED
FLUAV H3 RNA NPH QL NAA+PROBE: NOT DETECTED
FLUAV SUBTYP SPEC NAA+PROBE: NOT DETECTED
FLUBV RNA ISLT QL NAA+PROBE: NOT DETECTED
GFR SERPL CREATININE-BSD FRML MDRD: 56 ML/MIN/1.73
GLUCOSE BLD-MCNC: 206 MG/DL (ref 65–99)
HADV DNA SPEC NAA+PROBE: NOT DETECTED
HCOV 229E RNA SPEC QL NAA+PROBE: NOT DETECTED
HCOV HKU1 RNA SPEC QL NAA+PROBE: NOT DETECTED
HCOV NL63 RNA SPEC QL NAA+PROBE: NOT DETECTED
HCOV OC43 RNA SPEC QL NAA+PROBE: NOT DETECTED
HCT VFR BLD AUTO: 46.6 % (ref 40.4–52.2)
HGB BLD-MCNC: 15.1 G/DL (ref 13.7–17.6)
HMPV RNA NPH QL NAA+NON-PROBE: NOT DETECTED
HOLD SPECIMEN: NORMAL
HPIV1 RNA SPEC QL NAA+PROBE: NOT DETECTED
HPIV2 RNA SPEC QL NAA+PROBE: NOT DETECTED
HPIV3 RNA NPH QL NAA+PROBE: DETECTED
HPIV4 P GENE NPH QL NAA+PROBE: NOT DETECTED
IMM GRANULOCYTES # BLD: 0.03 10*3/MM3 (ref 0–0.03)
IMM GRANULOCYTES NFR BLD: 0.3 % (ref 0–0.5)
LYMPHOCYTES # BLD AUTO: 1.91 10*3/MM3 (ref 0.9–4.8)
LYMPHOCYTES NFR BLD AUTO: 16.3 % (ref 19.6–45.3)
M PNEUMO IGG SER IA-ACNC: NOT DETECTED
MCH RBC QN AUTO: 28.3 PG (ref 27–32.7)
MCHC RBC AUTO-ENTMCNC: 32.4 G/DL (ref 32.6–36.4)
MCV RBC AUTO: 87.3 FL (ref 79.8–96.2)
MONOCYTES # BLD AUTO: 1.42 10*3/MM3 (ref 0.2–1.2)
MONOCYTES NFR BLD AUTO: 12.1 % (ref 5–12)
NEUTROPHILS # BLD AUTO: 8.21 10*3/MM3 (ref 1.9–8.1)
NEUTROPHILS NFR BLD AUTO: 70 % (ref 42.7–76)
PLATELET # BLD AUTO: 213 10*3/MM3 (ref 140–500)
PMV BLD AUTO: 10.1 FL (ref 6–12)
POTASSIUM BLD-SCNC: 4.2 MMOL/L (ref 3.5–5.2)
RBC # BLD AUTO: 5.34 10*6/MM3 (ref 4.6–6)
RHINOVIRUS RNA SPEC NAA+PROBE: NOT DETECTED
RSV RNA NPH QL NAA+NON-PROBE: NOT DETECTED
SODIUM BLD-SCNC: 140 MMOL/L (ref 136–145)
WBC NRBC COR # BLD: 11.72 10*3/MM3 (ref 4.5–10.7)
WHOLE BLOOD HOLD SPECIMEN: NORMAL

## 2018-06-08 PROCEDURE — 99284 EMERGENCY DEPT VISIT MOD MDM: CPT

## 2018-06-08 PROCEDURE — 71046 X-RAY EXAM CHEST 2 VIEWS: CPT

## 2018-06-08 PROCEDURE — 87798 DETECT AGENT NOS DNA AMP: CPT | Performed by: PHYSICIAN ASSISTANT

## 2018-06-08 PROCEDURE — 87633 RESP VIRUS 12-25 TARGETS: CPT | Performed by: PHYSICIAN ASSISTANT

## 2018-06-08 PROCEDURE — 87486 CHLMYD PNEUM DNA AMP PROBE: CPT | Performed by: PHYSICIAN ASSISTANT

## 2018-06-08 PROCEDURE — 96374 THER/PROPH/DIAG INJ IV PUSH: CPT

## 2018-06-08 PROCEDURE — 85025 COMPLETE CBC W/AUTO DIFF WBC: CPT | Performed by: PHYSICIAN ASSISTANT

## 2018-06-08 PROCEDURE — 80048 BASIC METABOLIC PNL TOTAL CA: CPT | Performed by: PHYSICIAN ASSISTANT

## 2018-06-08 PROCEDURE — 87581 M.PNEUMON DNA AMP PROBE: CPT | Performed by: PHYSICIAN ASSISTANT

## 2018-06-08 PROCEDURE — 25010000002 METHYLPREDNISOLONE PER 125 MG: Performed by: EMERGENCY MEDICINE

## 2018-06-08 PROCEDURE — 94640 AIRWAY INHALATION TREATMENT: CPT

## 2018-06-08 PROCEDURE — 94799 UNLISTED PULMONARY SVC/PX: CPT

## 2018-06-08 RX ORDER — SODIUM CHLORIDE 0.9 % (FLUSH) 0.9 %
10 SYRINGE (ML) INJECTION AS NEEDED
Status: DISCONTINUED | OUTPATIENT
Start: 2018-06-08 | End: 2018-06-08

## 2018-06-08 RX ORDER — METHYLPREDNISOLONE 4 MG/1
4 TABLET ORAL DAILY
COMMUNITY
End: 2018-06-08

## 2018-06-08 RX ORDER — FLUTICASONE PROPIONATE 50 MCG
2 SPRAY, SUSPENSION (ML) NASAL DAILY
Qty: 1 BOTTLE | Refills: 0 | Status: SHIPPED | OUTPATIENT
Start: 2018-06-08 | End: 2018-06-15

## 2018-06-08 RX ORDER — GUAIFENESIN 600 MG/1
1200 TABLET, EXTENDED RELEASE ORAL 2 TIMES DAILY
COMMUNITY
End: 2020-01-27

## 2018-06-08 RX ORDER — ALBUTEROL SULFATE 2.5 MG/3ML
2.5 SOLUTION RESPIRATORY (INHALATION) ONCE
Status: COMPLETED | OUTPATIENT
Start: 2018-06-08 | End: 2018-06-08

## 2018-06-08 RX ORDER — METHYLPREDNISOLONE SODIUM SUCCINATE 125 MG/2ML
125 INJECTION, POWDER, LYOPHILIZED, FOR SOLUTION INTRAMUSCULAR; INTRAVENOUS ONCE
Status: DISCONTINUED | OUTPATIENT
Start: 2018-06-08 | End: 2018-06-08

## 2018-06-08 RX ORDER — PREDNISONE 20 MG/1
20 TABLET ORAL 2 TIMES DAILY
Qty: 10 TABLET | Refills: 0 | Status: SHIPPED | OUTPATIENT
Start: 2018-06-08 | End: 2020-05-21

## 2018-06-08 RX ORDER — METHYLPREDNISOLONE SODIUM SUCCINATE 125 MG/2ML
125 INJECTION, POWDER, LYOPHILIZED, FOR SOLUTION INTRAMUSCULAR; INTRAVENOUS ONCE
Status: COMPLETED | OUTPATIENT
Start: 2018-06-08 | End: 2018-06-08

## 2018-06-08 RX ORDER — AZITHROMYCIN 250 MG/1
250 TABLET, FILM COATED ORAL DAILY
COMMUNITY
End: 2018-07-18

## 2018-06-08 RX ADMIN — ALBUTEROL SULFATE 2.5 MG: 2.5 SOLUTION RESPIRATORY (INHALATION) at 22:59

## 2018-06-08 RX ADMIN — METHYLPREDNISOLONE SODIUM SUCCINATE 125 MG: 125 INJECTION, POWDER, FOR SOLUTION INTRAMUSCULAR; INTRAVENOUS at 21:45

## 2018-06-09 NOTE — ED PROVIDER NOTES
The JACLYN and I have discussed this patient's history, physical exam, and treatment plan.  I have reviewed the documentation and personally had a face to face interaction with the patient. I affirm the documentation and agree with the treatment and plan.  The attached note describes my personal findings.    CC: NEAL  Pt presents to ED c/o SOA which began five days ago. On exam, pt is awake, alert, and in no acute distress, heart RRR with no murmur, rubs, or gallops, lungs CTAB. Notified pt of dx of viral URI and plan to discharge pt home with steroids and f/u with PCP. Pt understands and agrees with the plan, all questions answered.      Documentation assistance provided by yue Stewart for Dr. Leblanc.  Information recorded by the yue was done at my direction and has been verified and validated by me.       Mac Stewart  06/08/18 1107       Scott Leblanc MD  06/09/18 [id number]

## 2018-06-09 NOTE — ED PROVIDER NOTES
"EMERGENCY DEPARTMENT ENCOUNTER    Room Number:  38/38  Date seen:  6/9/2018  Time seen: 8:25 PM  PCP: Ludwin Reyna MD  Historian: Patient, Family      HPI:  Chief complaint: Dyspnea  Context: Brian Garcia is a 68 y.o. male who presents to the ED c/o intermittent episodes of dyspnea onset about 5 days ago. Pt reports that he has also had nasal congestion, generalized myalgias, mild cough, and bilateral ear pain. Pt denies headache, neck pain/stiffness, chest pain, abdominal pain, N/V/D, dysuria, and sore throat. Pt reports that he was initially seen for this at the [facility] earlier this week and was diagnosed with bronchitis for which pt was prescribed with rx for inhaler, anti-tussive, and a steroid pack. Pt states that he has used the inhaler and has taken the steroid pack and anti-tussive without significant sx relief. Pt states that he then was seen at his PMD's office for this and was told to \"eat some chicken noodle soup and to rest\"; however, pt was prescribed with rx for Azithromycin at his PMD's office. Pt reports that despite initiating the Azithromycin, pt developed subjective fever and chills yesterday for which he was again seen yesterday at the [facility]. Pt states that at the [facility] yesterday, his sx were thought to be viral in etiology and therefore, pt was advised to treat himself symptomatically. There are no other complaints at this time.     Onset Quality: Gradual in onset  Location: Respiratory   Radiation: N/A  Duration: Onset about 5 days ago  Timing: Intermittent  Character: \"difficulty breathing\"  Aggravating Factors: Nothing  Alleviating Factors: Nothing  Severity: Moderate         MEDICAL RECORD REVIEW    [facility] and on 06/07/18 secondary to bronchitis.           ALLERGIES  Codeine; Invokana [canagliflozin]; Metformin and related; Penicillins; Sulfa antibiotics; and Tramadol    PAST MEDICAL " HISTORY  Active Ambulatory Problems     Diagnosis Date Noted   • Acute bronchitis 02/15/2016   • Acute pharyngitis 02/15/2016   • Acute upper respiratory infection 02/15/2016   • Allergic rhinitis 02/15/2016   • Carpal tunnel syndrome 02/15/2016   • Cough 02/15/2016   • Non-insulin dependent type 2 diabetes mellitus 02/15/2016   • Alimentary obesity 02/15/2016   • Fatigue 02/15/2016   • Effusion of knee 02/15/2016   • Gonalgia 02/15/2016   • ED (erectile dysfunction) of organic origin 02/15/2016   • Muscle ache 02/15/2016   • Thumb pain 02/15/2016   • Welcome to Medicare preventive visit 04/17/2017   • Uncontrolled type 2 diabetes mellitus with complication, without long-term current use of insulin 12/21/2017   • Mixed hyperlipidemia 12/21/2017   • Vitamin D deficiency 12/21/2017   • Refusal of statin medication by patient 04/19/2018     Resolved Ambulatory Problems     Diagnosis Date Noted   • HLD (hyperlipidemia) 02/15/2016   • BP (high blood pressure) 02/15/2016   • Cellulitis of right lower extremity 06/05/2017   • Bee sting allergy 06/05/2017     Past Medical History:   Diagnosis Date   • Diabetes mellitus    • Hyperlipidemia    • Hypertension    • Neuromuscular disorder    • Obesity        PAST SURGICAL HISTORY  Past Surgical History:   Procedure Laterality Date   • ADENOIDECTOMY     • APPENDECTOMY     • BACK SURGERY     • LASIK Bilateral    • TONSILLECTOMY         FAMILY HISTORY  Family History   Problem Relation Age of Onset   • Myelodysplastic syndrome Mother    • Other Father         Cardiovascular disorder   • Diabetes Other    • Hyperlipidemia Other    • Hypertension Other        SOCIAL HISTORY  Social History     Social History   • Marital status:      Spouse name: N/A   • Number of children: N/A   • Years of education: N/A     Occupational History   • Not on file.     Social History Main Topics   • Smoking status: Never Smoker   • Smokeless tobacco: Never Used   • Alcohol use No   • Drug use: No    • Sexual activity: Not on file     Other Topics Concern   • Not on file     Social History Narrative   • No narrative on file           REVIEW OF SYSTEMS  Review of Systems   Constitutional: Positive for chills and fever (subjective).   HENT: Positive for congestion and ear pain (bilaterally). Negative for rhinorrhea and sore throat.    Eyes: Negative for pain.   Respiratory: Positive for cough and shortness of breath.    Cardiovascular: Negative for chest pain, palpitations and leg swelling.   Gastrointestinal: Negative for abdominal pain, diarrhea, nausea and vomiting.   Genitourinary: Negative for difficulty urinating, dysuria, flank pain and frequency.   Musculoskeletal: Positive for myalgias (generalized). Negative for neck pain and neck stiffness.   Skin: Negative for rash.   Neurological: Negative for dizziness, speech difficulty, weakness, light-headedness, numbness and headaches.   Psychiatric/Behavioral: Negative.    All other systems reviewed and are negative.          PHYSICAL EXAM  ED Triage Vitals   Temp Heart Rate Resp BP SpO2   06/08/18 1953 06/08/18 1953 06/08/18 1953 06/08/18 2002 06/08/18 1953   98.9 °F (37.2 °C) 91 18 131/68 95 %      Temp src Heart Rate Source Patient Position BP Location FiO2 (%)   -- -- 06/08/18 2002 -- --     Sitting       Physical Exam   Constitutional: He is oriented to person, place, and time. No distress.   HENT:   Head: Normocephalic.   Right Ear: Tympanic membrane normal.   Left Ear: Tympanic membrane normal.   Mouth/Throat: Mucous membranes are normal. No oropharyngeal exudate, posterior oropharyngeal edema or posterior oropharyngeal erythema.   Eyes: EOM are normal. Pupils are equal, round, and reactive to light.   Neck: Normal range of motion. Neck supple.   Cardiovascular: Normal rate, regular rhythm and normal heart sounds.    Pulmonary/Chest: Effort normal. No respiratory distress. He has decreased breath sounds (bilaterally - mild). He has no wheezes. He has  rhonchi in the right lower field and the left lower field. He has no rales.   Abdominal: Soft. There is no tenderness. There is no rebound and no guarding.   Musculoskeletal: Normal range of motion.   Neurological: He is alert and oriented to person, place, and time. He has normal sensation.   Skin: Skin is warm and dry.   Psychiatric: Mood and affect normal.   Nursing note and vitals reviewed.          LAB RESULTS  Recent Results (from the past 24 hour(s))   Basic Metabolic Panel    Collection Time: 06/08/18  9:34 PM   Result Value Ref Range    Glucose 206 (H) 65 - 99 mg/dL    BUN 25 (H) 8 - 23 mg/dL    Creatinine 1.29 (H) 0.76 - 1.27 mg/dL    Sodium 140 136 - 145 mmol/L    Potassium 4.2 3.5 - 5.2 mmol/L    Chloride 101 98 - 107 mmol/L    CO2 25.7 22.0 - 29.0 mmol/L    Calcium 9.1 8.6 - 10.5 mg/dL    eGFR Non African Amer 56 (L) >60 mL/min/1.73    BUN/Creatinine Ratio 19.4 7.0 - 25.0    Anion Gap 13.3 mmol/L   CBC Auto Differential    Collection Time: 06/08/18  9:34 PM   Result Value Ref Range    WBC 11.72 (H) 4.50 - 10.70 10*3/mm3    RBC 5.34 4.60 - 6.00 10*6/mm3    Hemoglobin 15.1 13.7 - 17.6 g/dL    Hematocrit 46.6 40.4 - 52.2 %    MCV 87.3 79.8 - 96.2 fL    MCH 28.3 27.0 - 32.7 pg    MCHC 32.4 (L) 32.6 - 36.4 g/dL    RDW 14.4 11.5 - 14.5 %    RDW-SD 45.7 37.0 - 54.0 fl    MPV 10.1 6.0 - 12.0 fL    Platelets 213 140 - 500 10*3/mm3    Neutrophil % 70.0 42.7 - 76.0 %    Lymphocyte % 16.3 (L) 19.6 - 45.3 %    Monocyte % 12.1 (H) 5.0 - 12.0 %    Eosinophil % 0.9 0.3 - 6.2 %    Basophil % 0.4 0.0 - 1.5 %    Immature Grans % 0.3 0.0 - 0.5 %    Neutrophils, Absolute 8.21 (H) 1.90 - 8.10 10*3/mm3    Lymphocytes, Absolute 1.91 0.90 - 4.80 10*3/mm3    Monocytes, Absolute 1.42 (H) 0.20 - 1.20 10*3/mm3    Eosinophils, Absolute 0.10 0.00 - 0.70 10*3/mm3    Basophils, Absolute 0.05 0.00 - 0.20 10*3/mm3    Immature Grans, Absolute 0.03 0.00 - 0.03 10*3/mm3   Respiratory Panel, PCR - Swab, Nasopharynx    Collection Time:  06/08/18  9:34 PM   Result Value Ref Range    ADENOVIRUS, PCR Not Detected Not Detected    Coronavirus 229E Not Detected Not Detected    Coronavirus HKU1 Not Detected Not Detected    Coronavirus NL63 Not Detected Not Detected    Coronavirus OC43 Not Detected Not Detected    Human Metapneumovirus Not Detected Not Detected    Human Rhinovirus/Enterovirus Not Detected Not Detected    Influenza B PCR Not Detected Not Detected    Parainfluenza Virus 1 Not Detected Not Detected    Parainfluenza Virus 2 Not Detected Not Detected    Parainfluenza Virus 3 Detected (A) Not Detected    Parainfluenza Virus 4 Not Detected Not Detected    Bordetella pertussis pcr Not Detected Not Detected    Influenza A H1 2009 PCR Not Detected Not Detected    Chlamydophila pneumoniae PCR Not Detected Not Detected    Mycoplasma pneumo by PCR Not Detected Not Detected    Influenza A PCR Not Detected Not Detected    Influenza A H3 Not Detected Not Detected    Influenza A H1 Not Detected Not Detected    RSV, PCR Not Detected Not Detected   Light Blue Top    Collection Time: 06/08/18  9:34 PM   Result Value Ref Range    Extra Tube hold for add-on    Gold Top - SST    Collection Time: 06/08/18  9:34 PM   Result Value Ref Range    Extra Tube Hold for add-ons.        I ordered the above labs and reviewed the results        RADIOLOGY  XR Chest 2 View   Final Result   No evidence for acute pulmonary process. Follow-up as   clinical indications persist.       This report was finalized on 6/8/2018 8:19 PM by Dr. Angel Luis Lane M.D.              I ordered the above noted radiological studies and reviewed the images on the PACS system.        MEDICATIONS GIVEN IN ER  Medications   methylPREDNISolone sodium succinate (SOLU-Medrol) injection 125 mg (125 mg Intravenous Given 6/8/18 2815)   albuterol (PROVENTIL) nebulizer solution 0.083% 2.5 mg/3mL (2.5 mg Nebulization Given 6/8/18 7732)           PROCEDURES  Procedures          PROGRESS AND  "CONSULTS    Progress Notes:          8:53 PM:  Solumedrol ordered to decrease inflammation. Blood work and respiratory viral panel ordered for further evaluation.     9:08 PM:  Reviewed pt's history and workup with Dr. Leblanc (ER physician).  After a bedside evaluation, Dr. Leblanc agrees with the plan of care.    10:29 PM:  Rechecked pt. Pt is resting comfortably and appears in no acute distress. Informed pt that his creatinine is 1.29. CXR is negative acute. Awaiting respiratory viral panel results.     10:44 PM:  Albuterol nebulizer ordered to improve pt's dyspnea.     11:23 PM:  Rechecked pt. Informed pt that his respiratory viral panel is (+) for parainfluenza virus 3. Pt will be prescribed with rx for prednisone and flonase (pt was advised to stop taking the steroid taper that he is currently taking). Pt was advised to continue his course of antibiotics, to take tylenol for fever control, to hydrate, and to f/u with PMD. Strict RTER warnings given. Pt agrees with plan for discharge.           Disposition vitals:  /78 (BP Location: Right arm, Patient Position: Lying)   Pulse 76   Temp 98.9 °F (37.2 °C)   Resp 16   Ht 157.5 cm (62\")   Wt 74.8 kg (165 lb)   SpO2 94%   BMI 30.18 kg/m²           DIAGNOSIS  Final diagnoses:   Acute bronchitis due to parainfluenza virus           DISPOSITION  Pt discharged.      DISCHARGE    Patient discharged in stable condition.    Reviewed implications of results, diagnosis, meds, responsibility to follow up, warning signs and symptoms of possible worsening, potential complications and reasons to return to ER.    Patient/Family voiced understanding of above instructions.    Discussed plan for discharge, as there is no emergent indication for admission. Pt/family is agreeable and understands need for follow up and repeat testing. Pt is aware that discharge does not mean that nothing is wrong but it indicates no emergency is present that requires admission and they " must continue care with follow-up as given below or physician of their choice.     FOLLOW-UP  Ludwin Reyna MD  [id number]  [place] [address]  [phone]    Schedule an appointment as soon as possible for a visit            Medication List      New Prescriptions    predniSONE 20 MG tablet  Commonly known as:  DELTASONE  Take 1 tablet by mouth 2 (Two) Times a Day.        Changed    * fluticasone 50 MCG/ACT nasal spray  Commonly known as:  FLONASE  2 sprays into each nostril Daily.  What changed:  Another medication with the same name was added. Make sure   you understand how and when to take each.     * fluticasone 50 MCG/ACT nasal spray  Commonly known as:  FLONASE  2 sprays into each nostril Daily for 7 days.  What changed:  You were already taking a medication with the same name,   and this prescription was added. Make sure you understand how and when to   take each.        * This list has 2 medication(s) that are the same as other medications   prescribed for you. Read the directions carefully, and ask your doctor or   other care provider to review them with you.            Stop    cetirizine 10 MG tablet  Commonly known as:  zyrTEC     methylPREDNISolone 4 MG tablet  Commonly known as:  MEDROL                        Documentation assistance provided by yue Tillman for Mr. J Luis Mclaughlin PA-C.  Information recorded by the yue was done at my direction and has been verified and validated by me.                 Ghassan Tillman  06/08/18 1422       MARLEN Markham III  06/09/18 [id number]

## 2018-06-11 ENCOUNTER — TELEPHONE (OUTPATIENT)
Dept: SOCIAL WORK | Facility: HOSPITAL | Age: 68
End: 2018-06-11

## 2018-06-11 NOTE — TELEPHONE ENCOUNTER
ED f/u phone call: states he is taking prescribed meds, feels much better and plans to fu w/ PCP. No questions/concerns

## 2018-07-05 ENCOUNTER — LAB (OUTPATIENT)
Dept: ENDOCRINOLOGY | Age: 68
End: 2018-07-05

## 2018-07-05 DIAGNOSIS — E78.2 MIXED HYPERLIPIDEMIA: ICD-10-CM

## 2018-07-05 DIAGNOSIS — IMO0002 UNCONTROLLED TYPE 2 DIABETES MELLITUS WITH COMPLICATION, WITHOUT LONG-TERM CURRENT USE OF INSULIN: ICD-10-CM

## 2018-07-05 DIAGNOSIS — E29.1 MALE HYPOGONADISM: ICD-10-CM

## 2018-07-05 DIAGNOSIS — N42.9 DISORDER OF PROSTATE: ICD-10-CM

## 2018-07-05 DIAGNOSIS — Z79.899 MEDICATION MANAGEMENT: ICD-10-CM

## 2018-07-05 DIAGNOSIS — E67.3 HYPERVITAMINOSIS D: ICD-10-CM

## 2018-07-06 ENCOUNTER — TELEPHONE (OUTPATIENT)
Dept: ENDOCRINOLOGY | Age: 68
End: 2018-07-06

## 2018-07-06 NOTE — TELEPHONE ENCOUNTER
----- Message from LUX Hong sent at 7/5/2018 11:34 AM EDT -----  Please refill his to testosterone and run [facility] please  ----- Message -----  From: Mikaela Briggs MA  Sent: 7/5/2018   9:10 AM  To: LUX Hong    Is this okay to refill ?     ----- Message -----  From: Jessica Weiss  Sent: 7/5/2018   8:55 AM  To: Mikaela Briggs MA    Pt out of testosterome  He needs to take on july13th    I believe his appointment is July 18th    Can you put a prescription in for him

## 2018-07-07 LAB
25(OH)D3+25(OH)D2 SERPL-MCNC: 48.4 NG/ML (ref 30–100)
ALBUMIN SERPL-MCNC: 4.5 G/DL (ref 3.5–5.2)
ALBUMIN/CREAT UR: 4.7 MG/G CREAT (ref 0–30)
ALBUMIN/GLOB SERPL: 2.1 G/DL
ALP SERPL-CCNC: 30 U/L (ref 39–117)
ALT SERPL-CCNC: 18 U/L (ref 1–41)
AST SERPL-CCNC: 16 U/L (ref 1–40)
BASOPHILS # BLD AUTO: 0.04 10*3/MM3 (ref 0–0.2)
BASOPHILS NFR BLD AUTO: 0.6 % (ref 0–1.5)
BILIRUB SERPL-MCNC: 0.3 MG/DL (ref 0.1–1.2)
BUN SERPL-MCNC: 19 MG/DL (ref 8–23)
BUN/CREAT SERPL: 19.8 (ref 7–25)
C PEPTIDE SERPL-MCNC: 2.4 NG/ML (ref 1.1–4.4)
CALCIUM SERPL-MCNC: 8.8 MG/DL (ref 8.6–10.5)
CHLORIDE SERPL-SCNC: 100 MMOL/L (ref 98–107)
CHOLEST SERPL-MCNC: 162 MG/DL (ref 0–200)
CO2 SERPL-SCNC: 25.7 MMOL/L (ref 22–29)
CREAT SERPL-MCNC: 0.96 MG/DL (ref 0.76–1.27)
CREAT UR-MCNC: 97.4 MG/DL
EOSINOPHIL # BLD AUTO: 0.1 10*3/MM3 (ref 0–0.7)
EOSINOPHIL NFR BLD AUTO: 1.6 % (ref 0.3–6.2)
ERYTHROCYTE [DISTWIDTH] IN BLOOD BY AUTOMATED COUNT: 14.4 % (ref 11.5–14.5)
GLOBULIN SER CALC-MCNC: 2.1 GM/DL
GLUCOSE SERPL-MCNC: 114 MG/DL (ref 65–99)
HBA1C MFR BLD: 7.07 % (ref 4.8–5.6)
HCT VFR BLD AUTO: 47.9 % (ref 40.4–52.2)
HDLC SERPL-MCNC: 38 MG/DL (ref 40–60)
HGB BLD-MCNC: 15.8 G/DL (ref 13.7–17.6)
IMM GRANULOCYTES # BLD: 0.03 10*3/MM3 (ref 0–0.03)
IMM GRANULOCYTES NFR BLD: 0.5 % (ref 0–0.5)
INSULIN SERPL-ACNC: 4.9 UIU/ML (ref 2.6–24.9)
INTERPRETATION: NORMAL
LDLC SERPL CALC-MCNC: 98 MG/DL (ref 0–100)
LYMPHOCYTES # BLD AUTO: 2.41 10*3/MM3 (ref 0.9–4.8)
LYMPHOCYTES NFR BLD AUTO: 37.4 % (ref 19.6–45.3)
Lab: NORMAL
MCH RBC QN AUTO: 28.5 PG (ref 27–32.7)
MCHC RBC AUTO-ENTMCNC: 33 G/DL (ref 32.6–36.4)
MCV RBC AUTO: 86.5 FL (ref 79.8–96.2)
MICROALBUMIN UR-MCNC: 4.6 UG/ML
MONOCYTES # BLD AUTO: 0.82 10*3/MM3 (ref 0.2–1.2)
MONOCYTES NFR BLD AUTO: 12.7 % (ref 5–12)
NEUTROPHILS # BLD AUTO: 3.08 10*3/MM3 (ref 1.9–8.1)
NEUTROPHILS NFR BLD AUTO: 47.7 % (ref 42.7–76)
PLATELET # BLD AUTO: 217 10*3/MM3 (ref 140–500)
POTASSIUM SERPL-SCNC: 4.3 MMOL/L (ref 3.5–5.2)
PROT SERPL-MCNC: 6.6 G/DL (ref 6–8.5)
RBC # BLD AUTO: 5.54 10*6/MM3 (ref 4.6–6)
SODIUM SERPL-SCNC: 140 MMOL/L (ref 136–145)
T3FREE SERPL-MCNC: 3.2 PG/ML (ref 2–4.4)
T4 FREE SERPL-MCNC: 1.22 NG/DL (ref 0.93–1.7)
TESTOST FREE SERPL-MCNC: 15.9 PG/ML (ref 6.6–18.1)
TESTOST SERPL-MCNC: 756 NG/DL (ref 264–916)
THYROGLOB AB SERPL-ACNC: <1 IU/ML (ref 0–0.9)
THYROPEROXIDASE AB SERPL-ACNC: 10 IU/ML (ref 0–34)
TRIGL SERPL-MCNC: 128 MG/DL (ref 0–150)
TSH SERPL DL<=0.005 MIU/L-ACNC: 1.95 MIU/ML (ref 0.27–4.2)
URATE SERPL-MCNC: 5.3 MG/DL (ref 3.4–7)
VLDLC SERPL CALC-MCNC: 25.6 MG/DL (ref 5–40)
WBC # BLD AUTO: 6.45 10*3/MM3 (ref 4.5–10.7)

## 2018-07-18 ENCOUNTER — OFFICE VISIT (OUTPATIENT)
Dept: ENDOCRINOLOGY | Age: 68
End: 2018-07-18

## 2018-07-18 VITALS
BODY MASS INDEX: 30.73 KG/M2 | HEIGHT: 62 IN | WEIGHT: 167 LBS | DIASTOLIC BLOOD PRESSURE: 78 MMHG | SYSTOLIC BLOOD PRESSURE: 124 MMHG

## 2018-07-18 DIAGNOSIS — E29.1 MALE HYPOGONADISM: ICD-10-CM

## 2018-07-18 DIAGNOSIS — E78.2 MIXED HYPERLIPIDEMIA: ICD-10-CM

## 2018-07-18 DIAGNOSIS — Z79.899 MEDICATION MANAGEMENT: ICD-10-CM

## 2018-07-18 DIAGNOSIS — IMO0002 UNCONTROLLED TYPE 2 DIABETES MELLITUS WITH COMPLICATION, WITHOUT LONG-TERM CURRENT USE OF INSULIN: Primary | ICD-10-CM

## 2018-07-18 DIAGNOSIS — E55.9 VITAMIN D DEFICIENCY: ICD-10-CM

## 2018-07-18 DIAGNOSIS — R53.82 CHRONIC FATIGUE: ICD-10-CM

## 2018-07-18 PROCEDURE — 99214 OFFICE O/P EST MOD 30 MIN: CPT | Performed by: NURSE PRACTITIONER

## 2018-07-18 RX ORDER — TESTOSTERONE CYPIONATE 200 MG/ML
100 INJECTION, SOLUTION INTRAMUSCULAR
Qty: 4 ML | Refills: 0 | Status: SHIPPED | OUTPATIENT
Start: 2018-07-18 | End: 2018-08-24 | Stop reason: SDUPTHER

## 2018-07-18 RX ORDER — EMPAGLIFLOZIN 25 MG/1
25 TABLET, FILM COATED ORAL EVERY MORNING
Qty: 90 TABLET | Refills: 1 | Status: SHIPPED | OUTPATIENT
Start: 2018-07-18 | End: 2018-10-30 | Stop reason: SDUPTHER

## 2018-07-18 NOTE — PROGRESS NOTES
Brian Garcia  presents to the office  for the follow-up appointment for Type 2 diabetes mellitus.     The diabete's condition location is throughout the system with the  clinical course has fluctuated, the severity is Mild, and the modifying/allievating factors are oral medications.  Medications and  Dosages were  reviewed with Brian Garcia and suggested that compliance most of the time.    The patient reports associated symptoms of hyperglycemia have been none and associated symptoms of hypoglycemia have been none, with their  hypoglycemia threshold for symptoms is n/a mg/dl .     The patient is currently on oral medications .      Compliance with blood glucose monitoring: good.     Meal panning: The patient is using avoidance of concentrated sweets.    The patient is currently taking home blood tests - Blood glucose testin times daily, that are:  fasting- 1st thing in morning before eating or drinking    Last instructions given were:  Start testosterone inj 1ml every 14 days  Lab check in 6 weeks.      Stop The Synjardy     Start Jardiance 25 mg once in the morning    Home blood glucose testing daily: 1  FB to 120 mg/dl    Last reported blood glucose readings are:  Home blood glucose testing daily: 2-3  FB to 110 mg/dl  after lunch 120 to 150 mg/dl   after dinner/supper 90 to 130 mg/dl    Patterns reported per patient are none.         The following portions of the patient's history were reviewed and updated as appropriate: current medications, past family history, past medical history, past social history, past surgical history and problem list.      Current Outpatient Prescriptions:   •  ACCU-CHEK FASTCLIX LANCETS misc, Test 4 times daily, Disp: 204 each, Rfl: 5  •  aspirin 81 MG tablet, Take 81 mg by mouth daily., Disp: , Rfl:   •  Blood Glucose Monitoring Suppl (ACCU-CHEK ROMAN) device, Use as instructed, Disp: 1 each, Rfl: 0  •  fluticasone (FLONASE) 50 MCG/ACT nasal spray, 2 sprays into each  "nostril Daily., Disp: 5 bottle, Rfl: 5  •  fluticasone (VERAMYST) 27.5 MCG/SPRAY nasal spray, 2 sprays into each nostril Daily., Disp: , Rfl:   •  glucose blood (ACCU-CHEK ROMAN) test strip, Test 4 times daily, Disp: 125 each, Rfl: 5  •  guaiFENesin (MUCINEX) 600 MG 12 hr tablet, Take 1,200 mg by mouth 2 (Two) Times a Day., Disp: , Rfl:   •  JARDIANCE 25 MG tablet, Take 25 mg by mouth Every Morning. Take 1 by mouth every day in the morning, Disp: 90 tablet, Rfl: 1  •  Needle, Disp, 22G X 1\" misc, 1 inj weekly, Disp: 12 each, Rfl: 4  •  predniSONE (DELTASONE) 20 MG tablet, Take 1 tablet by mouth 2 (Two) Times a Day., Disp: 10 tablet, Rfl: 0  •  SITagliptin (JANUVIA) 100 MG tablet, Take 1 tablet by mouth Daily., Disp: 90 tablet, Rfl: 1  •  Syringe 18G X 1-1/2\" 3 ML misc, 1 inj every week, Disp: 12 each, Rfl: 4  •  tadalafil (CIALIS) 5 MG tablet, Take 1 daily for BPH, Disp: 30 tablet, Rfl: 5  •  Testosterone Cypionate (DEPO-TESTOSTERONE) 200 MG/ML injection, Inject 0.5 mL into the appropriate muscle as directed by prescriber Every 14 (Fourteen) Days., Disp: 4 mL, Rfl: 0  •  vitamin D (ERGOCALCIFEROL) 33029 units capsule capsule, TAKE ONE CAPSULE BY MOUTH ONCE A MONTH, Disp: 12 capsule, Rfl: 1    Patient Active Problem List    Diagnosis   • Medication management [Z79.899]   • Male hypogonadism [E29.1]   • Refusal of statin medication by patient [Z53.29]   • Uncontrolled type 2 diabetes mellitus with complication, without long-term current use of insulin (CMS/[facility]) [E11.8, E11.65]   • Mixed hyperlipidemia [E78.2]   • Vitamin D deficiency [E55.9]   • Welcome to Medicare preventive visit [Z00.00]   • Acute bronchitis [J20.9]   • Acute pharyngitis [J02.9]   • Acute upper respiratory infection [J06.9]   • Allergic rhinitis [J30.9]   • Carpal tunnel syndrome [G56.00]   • Cough [R05]   • Non-insulin dependent type 2 diabetes mellitus (CMS/HCC) [E11.9]   • Alimentary obesity [E66.9]   • Fatigue [R53.83]   • Effusion of knee " "[M25.469]   • Gonalgia [M25.569]   • 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      Objective:     Wt Readings from Last 3 Encounters:   07/18/18 75.8 kg (167 lb)   06/08/18 74.8 kg (165 lb)   04/19/18 76.7 kg (169 lb)     Temp Readings from Last 3 Encounters:   06/08/18 98 °F (36.7 °C) (Oral)   04/19/18 97.4 °F (36.3 °C) (Oral)   12/04/17 98.1 °F (36.7 °C) (Oral)     BP Readings from Last 3 Encounters:   07/18/18 124/78   06/08/18 128/78   04/19/18 123/78     Pulse Readings from Last 3 Encounters:   06/08/18 75   04/19/18 67   12/04/17 66        /78   Ht 157.5 cm (62.01\")   Wt 75.8 kg (167 lb)   BMI 30.54 kg/m²     General appearance:  alert, cooperative, delirious, fatigued, nourished\" \"appears stated age and cooperative\" \"NAD   Neck: no carotid bruit, supple, symmetrical, trachea midline and thyroid not enlarged, symmetric, no tenderness/mass/nodules   Thyroid:  no palpable nodule, no enlargement, no tenderness   Lung:  \"clear to auscultation bilaterally\" \"no abnormal breath sounds  \" effort was normal, no labored breath, no use of accessory muscles.   Heart: regular rate and rhythm, S1, S2 normal, no murmur, click, rub or gallop      Abdomen:  normal bowel sounds- 4 quads, soft non-tender and contour - slt rounded      Extremities: extremities normal, atraumatic, no cyanosis or edema extremities normal, atraumatic, no cyanosis or edema\" WNL - gait and station, strength and stability\"       Skin:   Pulses:  warm and dry, no hyperpigmentation, normal skin coloring, or suspicious lesions   2+ and symmetric   Neuro: Alert and oriented x3. Gait normal. Reflexes and motor strength normal and symmetric. Cranial nerves 2-12 and sensation grossly intact.      Psych: behavior - " normal, judgement - normal, mood - normal, affect - normal                 Lab Review  Results for orders placed or performed in visit on 07/05/18   C-Peptide   Result Value Ref Range    C-Peptide 2.4 1.1 - 4.4 ng/mL   Comprehensive Metabolic Panel   Result Value Ref Range    Glucose 114 (H) 65 - 99 mg/dL    BUN 19 8 - 23 mg/dL    Creatinine 0.96 0.76 - 1.27 mg/dL    eGFR Non African Am 78 >60 mL/min/1.73    eGFR African Am 95 >60 mL/min/1.73    BUN/Creatinine Ratio 19.8 7.0 - 25.0    Sodium 140 136 - 145 mmol/L    Potassium 4.3 3.5 - 5.2 mmol/L    Chloride 100 98 - 107 mmol/L    Total CO2 25.7 22.0 - 29.0 mmol/L    Calcium 8.8 8.6 - 10.5 mg/dL    Total Protein 6.6 6.0 - 8.5 g/dL    Albumin 4.50 3.50 - 5.20 g/dL    Globulin 2.1 gm/dL    A/G Ratio 2.1 g/dL    Total Bilirubin 0.3 0.1 - 1.2 mg/dL    Alkaline Phosphatase 30 (L) 39 - 117 U/L    AST (SGOT) 16 1 - 40 U/L    ALT (SGPT) 18 1 - 41 U/L   Hemoglobin A1c   Result Value Ref Range    Hemoglobin A1C 7.07 (H) 4.80 - 5.60 %   Insulin, Total   Result Value Ref Range    Insulin 4.9 2.6 - 24.9 uIU/mL   Lipid Panel   Result Value Ref Range    Total Cholesterol 162 0 - 200 mg/dL    Triglycerides 128 0 - 150 mg/dL    HDL Cholesterol 38 (L) 40 - 60 mg/dL    VLDL Cholesterol 25.6 5 - 40 mg/dL    LDL Cholesterol  98 0 - 100 mg/dL   Uric Acid   Result Value Ref Range    Uric Acid 5.3 3.4 - 7.0 mg/dL   Vitamin D 25 Hydroxy   Result Value Ref Range    25 Hydroxy, Vitamin D 48.4 30.0 - 100.0 ng/ml   TSH   Result Value Ref Range    TSH 1.950 0.270 - 4.200 mIU/mL   Thyroid Antibodies   Result Value Ref Range    Thyroid Peroxidase Antibody 10 0 - 34 IU/mL    Thyroglobulin Ab <1.0 0.0 - 0.9 IU/mL   T4, Free   Result Value Ref Range    Free T4 1.22 0.93 - 1.70 ng/dL   T3, Free   Result Value Ref Range    T3, Free 3.2 2.0 - 4.4 pg/mL   Testosterone, Free, Total   Result Value Ref Range    Testosterone, Total 756 264 - 916 ng/dL    Testosterone, Free 15.9 6.6 - 18.1 pg/mL   Microalbumin  / Creatinine Urine Ratio   Result Value Ref Range    Creatinine, Urine 97.4 Not Estab. mg/dL    Microalbumin, Urine 4.6 Not Estab. ug/mL    Microalbumin/Creatinine Ratio 4.7 0.0 - 30.0 mg/g creat   Cardiovascular Risk Assessment   Result Value Ref Range    Interpretation Note    Diabetes Patient Education   Result Value Ref Range    PDF Image Not applicable    CBC & Differential   Result Value Ref Range    WBC 6.45 4.50 - 10.70 10*3/mm3    RBC 5.54 4.60 - 6.00 10*6/mm3    Hemoglobin 15.8 13.7 - 17.6 g/dL    Hematocrit 47.9 40.4 - 52.2 %    MCV 86.5 79.8 - 96.2 fL    MCH 28.5 27.0 - 32.7 pg    MCHC 33.0 32.6 - 36.4 g/dL    RDW 14.4 11.5 - 14.5 %    Platelets 217 140 - 500 10*3/mm3    Neutrophil Rel % 47.7 42.7 - 76.0 %    Lymphocyte Rel % 37.4 19.6 - 45.3 %    Monocyte Rel % 12.7 (H) 5.0 - 12.0 %    Eosinophil Rel % 1.6 0.3 - 6.2 %    Basophil Rel % 0.6 0.0 - 1.5 %    Neutrophils Absolute 3.08 1.90 - 8.10 10*3/mm3    Lymphocytes Absolute 2.41 0.90 - 4.80 10*3/mm3    Monocytes Absolute 0.82 0.20 - 1.20 10*3/mm3    Eosinophils Absolute 0.10 0.00 - 0.70 10*3/mm3    Basophils Absolute 0.04 0.00 - 0.20 10*3/mm3    Immature Granulocyte Rel % 0.5 0.0 - 0.5 %    Immature Grans Absolute 0.03 0.00 - 0.03 10*3/mm3           Assessment:   Brian was seen today for follow-up.    Diagnoses and all orders for this visit:    Uncontrolled type 2 diabetes mellitus with complication, without long-term current use of insulin (CMS/[facility])  -     Comprehensive Metabolic Panel; Future  -     C-Peptide; Future  -     Hemoglobin A1c; Future  -     Insulin, Total; Future  -     Lipid Panel; Future  -     Microalbumin / Creatinine Urine Ratio - Urine, Clean Catch; Future  -     Uric Acid; Future  -     Vitamin D 25 Hydroxy; Future  -     TSH; Future  -     Thyroid Antibodies; Future  -     T4, Free; Future  -     T3, Free; Future  -     Hemoglobin & Hematocrit, Blood; Future  -     Testosterone, Free, Total; Future  -     Testosterone; Future  -      SITagliptin (JANUVIA) 100 MG tablet; Take 1 tablet by mouth Daily.  -     JARDIANCE 25 MG tablet; Take 25 mg by mouth Every Morning. Take 1 by mouth every day in the morning    Male hypogonadism  -     Comprehensive Metabolic Panel; Future  -     Hemoglobin & Hematocrit, Blood; Future  -     Testosterone, Free, Total; Future  -     Testosterone; Future  -     Testosterone Cypionate (DEPO-TESTOSTERONE) 200 MG/ML injection; Inject 0.5 mL into the appropriate muscle as directed by prescriber Every 14 (Fourteen) Days.    Medication management  -     Comprehensive Metabolic Panel; Future    Mixed hyperlipidemia  -     Comprehensive Metabolic Panel; Future    Vitamin D deficiency  -     Comprehensive Metabolic Panel; Future  -     Vitamin D 25 Hydroxy; Future    Chronic fatigue  -     Comprehensive Metabolic Panel; Future  -     C-Peptide; Future  -     Hemoglobin A1c; Future  -     Insulin, Total; Future  -     Lipid Panel; Future  -     Microalbumin / Creatinine Urine Ratio - Urine, Clean Catch; Future  -     Uric Acid; Future  -     Vitamin D 25 Hydroxy; Future  -     TSH; Future  -     Thyroid Antibodies; Future  -     T4, Free; Future  -     T3, Free; Future  -     Hemoglobin & Hematocrit, Blood; Future  -     Testosterone, Free, Total; Future  -     Testosterone; Future          Plan:   In summary: I met with patient who is metabolic stable. Patient Patient has no complaints with hyper or hypoglycemia.  Tolerating all medications well.  Is still happy concerning the metformin BUN taken off.  He did report on his to testosterone injections around date 10-14 he is starting to feel down but has gotten better with this the last couple of injections.  Instructed patient that if he started to feel down again between 10 and 14 days to take a half of an mL every week.  Patient verbally stated that he understood these instructions.  Instructions for follow-up with labs 2 weeks prior was given.          Medication changes:      home blood tests -  Blood glucose testing: 3 times daily, that are:  fasting- 1st thing in morning before eating or drinking  before each meal and 1 or 2 hours after meal  bedtime  anytime you feel symptoms of hyperglycemia or hypoglycemia (high or low blood sugars)        Education:  interpretation of lab results, blood sugar goals, complications of diabetes mellitus, hypoglycemia prevention and treatment, exercise, illness management, self-monitoring of blood glucose skills, nutrition, carbohydrate counting and site rotation        The total face to face time spent was  25 minutes  with additional education given: 14 minutes (greater than 50% of the total time) was spent with counseling and coordination of care on: SMBG with goals, Side effects profiles with medications, medication use and purposes,     Return in about 3 months (around 10/18/2018), or if symptoms worsen or fail to improve, for Recheck. 3 months with Harriet - 2 weeks prior for labs, 6 months with Dr. Lindsey and 2 weeks prior for labs        Dragon transcription disclaimer     Much of this encounter note is an electronic transcription/translation of spoken language to printed text. The electronic translation of spoken language may permit erroneous, or at times, nonsensical words or phrases to be inadvertently transcribed. Although I have reviewed the note for such errors, some may still exist.

## 2018-08-24 DIAGNOSIS — E29.1 MALE HYPOGONADISM: ICD-10-CM

## 2018-08-28 RX ORDER — TESTOSTERONE CYPIONATE 200 MG/ML
INJECTION, SOLUTION INTRAMUSCULAR
Qty: 2 ML | Refills: 0 | Status: SHIPPED | OUTPATIENT
Start: 2018-08-28 | End: 2018-08-30 | Stop reason: SDUPTHER

## 2018-08-30 DIAGNOSIS — E29.1 MALE HYPOGONADISM: ICD-10-CM

## 2018-08-31 RX ORDER — TESTOSTERONE CYPIONATE 200 MG/ML
INJECTION, SOLUTION INTRAMUSCULAR
Qty: 2 ML | Refills: 0 | Status: SHIPPED | OUTPATIENT
Start: 2018-08-31 | End: 2018-10-19 | Stop reason: SDUPTHER

## 2018-09-14 ENCOUNTER — APPOINTMENT (OUTPATIENT)
Dept: GENERAL RADIOLOGY | Facility: HOSPITAL | Age: 68
End: 2018-09-14

## 2018-09-14 ENCOUNTER — HOSPITAL ENCOUNTER (EMERGENCY)
Facility: HOSPITAL | Age: 68
Discharge: HOME | End: 2018-09-14
Attending: EMERGENCY MEDICINE | Admitting: EMERGENCY MEDICINE

## 2018-09-14 VITALS
DIASTOLIC BLOOD PRESSURE: 67 MMHG | RESPIRATION RATE: 18 BRPM | HEART RATE: 66 BPM | SYSTOLIC BLOOD PRESSURE: 126 MMHG | TEMPERATURE: 96.9 F | BODY MASS INDEX: 28.17 KG/M2 | HEIGHT: 64 IN | OXYGEN SATURATION: 99 % | WEIGHT: 165 LBS

## 2018-09-14 DIAGNOSIS — R07.89 MUSCULAR CHEST PAIN: Primary | ICD-10-CM

## 2018-09-14 LAB
ALBUMIN SERPL-MCNC: 4.2 G/DL (ref 3.5–5.2)
ALBUMIN/GLOB SERPL: 1.4 G/DL
ALP SERPL-CCNC: 25 U/L (ref 39–117)
ALT SERPL W P-5'-P-CCNC: 21 U/L (ref 1–41)
ANION GAP SERPL CALCULATED.3IONS-SCNC: 10.8 MMOL/L
AST SERPL-CCNC: 16 U/L (ref 1–40)
BASOPHILS # BLD AUTO: 0.03 10*3/MM3 (ref 0–0.2)
BASOPHILS NFR BLD AUTO: 0.5 % (ref 0–1.5)
BILIRUB SERPL-MCNC: 0.6 MG/DL (ref 0.1–1.2)
BUN BLD-MCNC: 15 MG/DL (ref 8–23)
BUN/CREAT SERPL: 14.4 (ref 7–25)
CALCIUM SPEC-SCNC: 9.1 MG/DL (ref 8.6–10.5)
CHLORIDE SERPL-SCNC: 101 MMOL/L (ref 98–107)
CO2 SERPL-SCNC: 27.2 MMOL/L (ref 22–29)
CREAT BLD-MCNC: 1.04 MG/DL (ref 0.76–1.27)
DEPRECATED RDW RBC AUTO: 43.3 FL (ref 37–54)
EOSINOPHIL # BLD AUTO: 0.08 10*3/MM3 (ref 0–0.7)
EOSINOPHIL NFR BLD AUTO: 1.3 % (ref 0.3–6.2)
ERYTHROCYTE [DISTWIDTH] IN BLOOD BY AUTOMATED COUNT: 14 % (ref 11.5–14.5)
GFR SERPL CREATININE-BSD FRML MDRD: 71 ML/MIN/1.73
GLOBULIN UR ELPH-MCNC: 2.9 GM/DL
GLUCOSE BLD-MCNC: 117 MG/DL (ref 65–99)
HCT VFR BLD AUTO: 50.8 % (ref 40.4–52.2)
HGB BLD-MCNC: 16.8 G/DL (ref 13.7–17.6)
HOLD SPECIMEN: NORMAL
HOLD SPECIMEN: NORMAL
IMM GRANULOCYTES # BLD: 0 10*3/MM3 (ref 0–0.03)
IMM GRANULOCYTES NFR BLD: 0 % (ref 0–0.5)
LYMPHOCYTES # BLD AUTO: 1.91 10*3/MM3 (ref 0.9–4.8)
LYMPHOCYTES NFR BLD AUTO: 29.9 % (ref 19.6–45.3)
MCH RBC QN AUTO: 28 PG (ref 27–32.7)
MCHC RBC AUTO-ENTMCNC: 33.1 G/DL (ref 32.6–36.4)
MCV RBC AUTO: 84.7 FL (ref 79.8–96.2)
MONOCYTES # BLD AUTO: 0.54 10*3/MM3 (ref 0.2–1.2)
MONOCYTES NFR BLD AUTO: 8.5 % (ref 5–12)
NEUTROPHILS # BLD AUTO: 3.83 10*3/MM3 (ref 1.9–8.1)
NEUTROPHILS NFR BLD AUTO: 59.8 % (ref 42.7–76)
PLATELET # BLD AUTO: 192 10*3/MM3 (ref 140–500)
PMV BLD AUTO: 9.9 FL (ref 6–12)
POTASSIUM BLD-SCNC: 4.4 MMOL/L (ref 3.5–5.2)
PROT SERPL-MCNC: 7.1 G/DL (ref 6–8.5)
RBC # BLD AUTO: 6 10*6/MM3 (ref 4.6–6)
SODIUM BLD-SCNC: 139 MMOL/L (ref 136–145)
TROPONIN T SERPL-MCNC: <0.01 NG/ML (ref 0–0.03)
WBC NRBC COR # BLD: 6.39 10*3/MM3 (ref 4.5–10.7)
WHOLE BLOOD HOLD SPECIMEN: NORMAL
WHOLE BLOOD HOLD SPECIMEN: NORMAL

## 2018-09-14 PROCEDURE — 93010 ELECTROCARDIOGRAM REPORT: CPT | Performed by: INTERNAL MEDICINE

## 2018-09-14 PROCEDURE — 96374 THER/PROPH/DIAG INJ IV PUSH: CPT

## 2018-09-14 PROCEDURE — 99283 EMERGENCY DEPT VISIT LOW MDM: CPT

## 2018-09-14 PROCEDURE — 80053 COMPREHEN METABOLIC PANEL: CPT | Performed by: NURSE PRACTITIONER

## 2018-09-14 PROCEDURE — 85025 COMPLETE CBC W/AUTO DIFF WBC: CPT | Performed by: NURSE PRACTITIONER

## 2018-09-14 PROCEDURE — 71045 X-RAY EXAM CHEST 1 VIEW: CPT

## 2018-09-14 PROCEDURE — 84484 ASSAY OF TROPONIN QUANT: CPT | Performed by: NURSE PRACTITIONER

## 2018-09-14 PROCEDURE — 93005 ELECTROCARDIOGRAM TRACING: CPT

## 2018-09-14 PROCEDURE — 25010000002 KETOROLAC TROMETHAMINE PER 15 MG: Performed by: EMERGENCY MEDICINE

## 2018-09-14 PROCEDURE — 93005 ELECTROCARDIOGRAM TRACING: CPT | Performed by: EMERGENCY MEDICINE

## 2018-09-14 RX ORDER — NAPROXEN 500 MG/1
500 TABLET ORAL 2 TIMES DAILY WITH MEALS
Qty: 20 TABLET | Refills: 0 | Status: SHIPPED | OUTPATIENT
Start: 2018-09-14 | End: 2018-11-27

## 2018-09-14 RX ORDER — KETOROLAC TROMETHAMINE 15 MG/ML
15 INJECTION, SOLUTION INTRAMUSCULAR; INTRAVENOUS ONCE
Status: COMPLETED | OUTPATIENT
Start: 2018-09-14 | End: 2018-09-14

## 2018-09-14 RX ADMIN — KETOROLAC TROMETHAMINE 15 MG: 15 INJECTION, SOLUTION INTRAMUSCULAR; INTRAVENOUS at 11:06

## 2018-09-14 NOTE — ED PROVIDER NOTES
EMERGENCY DEPARTMENT ENCOUNTER    CHIEF COMPLAINT  Chief Complaint: chest wall pain  History given by: patient  History limited by: none  Room Number: 26/26  PMD: Ludwin Reyna MD      HPI:  Pt is a 68 y.o. male who presents complaining of chest wall pain that developed after lifting batteries out of his boat earlier this week and has persisted despite taking OTC pain mediations. The pain starts on the left side of the chest then radiates down his LUE and is exacerbated w/ movement. He has associated vague cough and SOA. Sx appear to be improved after moving his muscles and being active like with previous pulled muscles.    Duration:  4 days  Onset: gradual  Timing: persistent  Location: left chest  Radiation: LUE  Quality: 'sharp'  Intensity/Severity: moderarte  Progression: unchanged  Associated Symptoms: vague cough and SOA  Aggravating Factors: movement  Alleviating Factors: prolonged movement  Previous Episodes: advised feels simiar to prior pulled muscles  Treatment before arrival: OTC pain medications    PAST MEDICAL HISTORY  Active Ambulatory Problems     Diagnosis Date Noted   • Acute bronchitis 02/15/2016   • Acute pharyngitis 02/15/2016   • Acute upper respiratory infection 02/15/2016   • Allergic rhinitis 02/15/2016   • Carpal tunnel syndrome 02/15/2016   • Cough 02/15/2016   • Non-insulin dependent type 2 diabetes mellitus (CMS/HCC) 02/15/2016   • Alimentary obesity 02/15/2016   • Fatigue 02/15/2016   • Effusion of knee 02/15/2016   • Gonalgia 02/15/2016   • ED (erectile dysfunction) of organic origin 02/15/2016   • Muscle ache 02/15/2016   • Thumb pain 02/15/2016   • Welcome to Medicare preventive visit 04/17/2017   • Uncontrolled type 2 diabetes mellitus with complication, without long-term current use of insulin (CMS/HCC) 12/21/2017   • Mixed hyperlipidemia 12/21/2017   • Vitamin D deficiency 12/21/2017   • Refusal of statin medication by patient 04/19/2018   • Medication management 07/18/2018    • Male hypogonadism 07/18/2018     Resolved Ambulatory Problems     Diagnosis Date Noted   • HLD (hyperlipidemia) 02/15/2016   • BP (high blood pressure) 02/15/2016   • Cellulitis of right lower extremity 06/05/2017   • Bee sting allergy 06/05/2017     Past Medical History:   Diagnosis Date   • Diabetes mellitus (CMS/HCC)    • Hyperlipidemia    • Hypertension    • Neuromuscular disorder (CMS/HCC)    • Obesity        PAST SURGICAL HISTORY  Past Surgical History:   Procedure Laterality Date   • ADENOIDECTOMY     • APPENDECTOMY     • BACK SURGERY     • LASIK Bilateral    • TONSILLECTOMY         FAMILY HISTORY  Family History   Problem Relation Age of Onset   • Myelodysplastic syndrome Mother    • Other Father         Cardiovascular disorder   • Diabetes Other    • Hyperlipidemia Other    • Hypertension Other        SOCIAL HISTORY  Social History     Social History   • Marital status:      Spouse name: N/A   • Number of children: N/A   • Years of education: N/A     Occupational History   • Not on file.     Social History Main Topics   • Smoking status: Never Smoker   • Smokeless tobacco: Never Used   • Alcohol use No   • Drug use: No   • Sexual activity: Not on file     Other Topics Concern   • Not on file     Social History Narrative   • No narrative on file       ALLERGIES  Codeine; Invokana [canagliflozin]; Metformin and related; Penicillins; Sulfa antibiotics; and Tramadol    REVIEW OF SYSTEMS  Review of Systems   Respiratory: Positive for cough (vague) and shortness of breath (vague).    Musculoskeletal:        Chest wall pain       PHYSICAL EXAM  ED Triage Vitals   Temp Heart Rate Resp BP SpO2   09/14/18 0942 09/14/18 0942 09/14/18 0942 09/14/18 1021 09/14/18 0942   96.9 °F (36.1 °C) 82 18 136/77 98 %      Temp src Heart Rate Source Patient Position BP Location FiO2 (%)   -- -- -- -- --              Physical Exam   Constitutional: He is oriented to person, place, and time. No distress.   HENT:   Head:  Normocephalic and atraumatic.   Mouth/Throat: Mucous membranes are normal.   Eyes: Pupils are equal, round, and reactive to light. EOM are normal.   Neck: Normal range of motion. Neck supple.   No c-spine tenderness   Cardiovascular: Normal rate, regular rhythm and normal heart sounds.    Pulses:       Radial pulses are 2+ on the left side.   Pulmonary/Chest: Effort normal and breath sounds normal. No respiratory distress.   Left sided chest wall tenderness that is exacerbated with adduction.   Abdominal: Soft. Bowel sounds are normal. There is no tenderness. There is no rebound and no guarding.   Musculoskeletal: Normal range of motion. He exhibits no edema.   Neurological: He is alert and oriented to person, place, and time. He has normal sensation and normal strength.   Skin: Skin is warm and dry.   Psychiatric: Mood and affect normal.   Nursing note and vitals reviewed.      LAB RESULTS  Lab Results (last 24 hours)     Procedure Component Value Units Date/Time    CBC & Differential [[id number]] Collected:  09/14/18 1025    Specimen:  Blood Updated:  09/14/18 1054    Narrative:       The following orders were created for panel order CBC & Differential.  Procedure                               Abnormality         Status                     ---------                               -----------         ------                     CBC Auto Differential[[id number]]        Normal              Final result                 Please view results for these tests on the individual orders.    Comprehensive Metabolic Panel [[id number]]  (Abnormal) Collected:  09/14/18 1025    Specimen:  Blood Updated:  09/14/18 1113     Glucose 117 (H) mg/dL      BUN 15 mg/dL      Creatinine 1.04 mg/dL      Sodium 139 mmol/L      Potassium 4.4 mmol/L      Chloride 101 mmol/L      CO2 27.2 mmol/L      Calcium 9.1 mg/dL      Total Protein 7.1 g/dL      Albumin 4.20 g/dL      ALT (SGPT) 21 U/L      AST (SGOT) 16 U/L      Alkaline Phosphatase 25 (L) U/L       Total Bilirubin 0.6 mg/dL      eGFR Non African Amer 71 mL/min/1.73      Globulin 2.9 gm/dL      A/G Ratio 1.4 g/dL      BUN/Creatinine Ratio 14.4     Anion Gap 10.8 mmol/L     Troponin [[id number]]  (Normal) Collected:  09/14/18 1025    Specimen:  Blood Updated:  09/14/18 1113     Troponin T <0.010 ng/mL     Narrative:       Troponin T Reference Ranges:  Less than 0.03 ng/mL:    Negative for AMI  0.03 to 0.09 ng/mL:      Indeterminant for AMI  Greater than 0.09 ng/mL: Positive for AMI    CBC Auto Differential [[id number]]  (Normal) Collected:  09/14/18 1025    Specimen:  Blood Updated:  09/14/18 1054     WBC 6.39 10*3/mm3      RBC 6.00 10*6/mm3      Hemoglobin 16.8 g/dL      Hematocrit 50.8 %      MCV 84.7 fL      MCH 28.0 pg      MCHC 33.1 g/dL      RDW 14.0 %      RDW-SD 43.3 fl      MPV 9.9 fL      Platelets 192 10*3/mm3      Neutrophil % 59.8 %      Lymphocyte % 29.9 %      Monocyte % 8.5 %      Eosinophil % 1.3 %      Basophil % 0.5 %      Immature Grans % 0.0 %      Neutrophils, Absolute 3.83 10*3/mm3      Lymphocytes, Absolute 1.91 10*3/mm3      Monocytes, Absolute 0.54 10*3/mm3      Eosinophils, Absolute 0.08 10*3/mm3      Basophils, Absolute 0.03 10*3/mm3      Immature Grans, Absolute 0.00 10*3/mm3           I ordered the above labs and reviewed the results    RADIOLOGY  XR Chest 1 View   There is no pulmonary airspace consolidation to suggest pneumonia, and there is no evidence for effusions or CHF.           I ordered the above noted radiological studies. Interpreted by radiologist. Reviewed by me in PACS.       PROCEDURES  Procedures  EKG    EKG time: 0947  Rhythm/Rate: NSR 65  No Acute Ischemia  Non-Specific ST-T changes    unchanged compared to prior on 04/2017    Interpreted Contemporaneously by me.  Independently viewed by me    PROGRESS AND CONSULTS  ED Course as of Sep 14 1210   Fri Sep 14, 2018   0951 Cp x 4 days.  Movement worsens and relieves pain. Pt was lifting batteries prior to pain  starting  [KG]      ED Course User Index  [KG] Palma Pichardo, APRN   1206  BP- 134/74 HR- 67 Temp- 96.9 °F (36.1 °C) O2 sat- 98%  Rechecked the patient who is in NAD and is resting comfortably. He reports feeling improved since receiving medication. Discussed imaging and labs being negative acute and the plan to d/c w/ rx for antiinflammatory. Pt understands and agrees with the plan, all questions answered.    MEDICAL DECISION MAKING  Results were reviewed/discussed with the patient and they were also made aware of online access. Pt also made aware that some labs, such as cultures, will not be resulted during ER visit and follow up with PMD is necessary.     MDM  Number of Diagnoses or Management Options  Muscular chest pain:      Amount and/or Complexity of Data Reviewed  Clinical lab tests: reviewed (Troponin is <0.010)  Tests in the radiology section of CPT®: reviewed (CXR-negative acute)  Tests in the medicine section of CPT®: reviewed (See EKG procedure note.)  Decide to obtain previous medical records or to obtain history from someone other than the patient: yes  Independent visualization of images, tracings, or specimens: yes    Patient Progress  Patient progress: stable         DIAGNOSIS  Final diagnoses:   Muscular chest pain       DISPOSITION  DISCHARGE    Patient discharged in stable condition.    Reviewed implications of results, diagnosis, meds, responsibility to follow up, warning signs and symptoms of possible worsening, potential complications and reasons to return to ER.    Patient/Family voiced understanding of above instructions.    Discussed plan for discharge, as there is no emergent indication for admission. Patient referred to primary care provider for BP management due to today's BP. Pt/family is agreeable and understands need for follow up and repeat testing.  Pt is aware that discharge does not mean that nothing is wrong but it indicates no emergency is present that requires admission  and they must continue care with follow-up as given below or physician of their choice.     FOLLOW-UP  Ludwin Reyna MD  [address] [place] [address]  [phone]    In 3 days  If symptoms worsen         Medication List      New Prescriptions    naproxen 500 MG EC tablet  Commonly known as:  EC NAPROSYN  Take 1 tablet by mouth 2 (Two) Times a Day With Meals.          Latest Documented Vital Signs:  As of 12:10 PM  BP- 134/74 HR- 67 Temp- 96.9 °F (36.1 °C) O2 sat- 98%    --  Documentation assistance provided by yue Agrawal for Dr. Castro.  Information recorded by the scribe was done at my direction and has been verified and validated by me.     Brenda Agrawal  09/14/18 1210       Adalberto Castro MD  09/14/18 1210

## 2018-09-27 ENCOUNTER — PATIENT OUTREACH (OUTPATIENT)
Dept: CASE MANAGEMENT | Facility: OTHER | Age: 68
End: 2018-09-27

## 2018-10-09 ENCOUNTER — EPISODE CHANGES (OUTPATIENT)
Dept: CASE MANAGEMENT | Facility: OTHER | Age: 68
End: 2018-10-09

## 2018-10-17 ENCOUNTER — LAB (OUTPATIENT)
Dept: ENDOCRINOLOGY | Age: 68
End: 2018-10-17

## 2018-10-17 DIAGNOSIS — IMO0002 UNCONTROLLED TYPE 2 DIABETES MELLITUS WITH COMPLICATION, WITHOUT LONG-TERM CURRENT USE OF INSULIN: ICD-10-CM

## 2018-10-17 DIAGNOSIS — R53.82 CHRONIC FATIGUE: ICD-10-CM

## 2018-10-18 ENCOUNTER — RESULTS ENCOUNTER (OUTPATIENT)
Dept: ENDOCRINOLOGY | Age: 68
End: 2018-10-18

## 2018-10-18 DIAGNOSIS — R53.82 CHRONIC FATIGUE: ICD-10-CM

## 2018-10-18 DIAGNOSIS — E78.2 MIXED HYPERLIPIDEMIA: ICD-10-CM

## 2018-10-18 DIAGNOSIS — E29.1 MALE HYPOGONADISM: ICD-10-CM

## 2018-10-18 DIAGNOSIS — Z79.899 MEDICATION MANAGEMENT: ICD-10-CM

## 2018-10-18 DIAGNOSIS — IMO0002 UNCONTROLLED TYPE 2 DIABETES MELLITUS WITH COMPLICATION, WITHOUT LONG-TERM CURRENT USE OF INSULIN: ICD-10-CM

## 2018-10-18 DIAGNOSIS — E55.9 VITAMIN D DEFICIENCY: ICD-10-CM

## 2018-10-19 DIAGNOSIS — E29.1 MALE HYPOGONADISM: ICD-10-CM

## 2018-10-19 LAB
25(OH)D3+25(OH)D2 SERPL-MCNC: 44 NG/ML (ref 30–100)
ALBUMIN SERPL-MCNC: 4.3 G/DL (ref 3.5–5.2)
ALBUMIN/CREAT UR: <4.1 MG/G CREAT (ref 0–30)
ALBUMIN/GLOB SERPL: 1.7 G/DL
ALP SERPL-CCNC: 26 U/L (ref 39–117)
ALT SERPL-CCNC: 19 U/L (ref 1–41)
AST SERPL-CCNC: 12 U/L (ref 1–40)
BILIRUB SERPL-MCNC: 0.8 MG/DL (ref 0.1–1.2)
BUN SERPL-MCNC: 16 MG/DL (ref 8–23)
BUN/CREAT SERPL: 15.1 (ref 7–25)
C PEPTIDE SERPL-MCNC: 2.1 NG/ML (ref 1.1–4.4)
CALCIUM SERPL-MCNC: 9.7 MG/DL (ref 8.6–10.5)
CHLORIDE SERPL-SCNC: 100 MMOL/L (ref 98–107)
CHOLEST SERPL-MCNC: 155 MG/DL (ref 0–200)
CO2 SERPL-SCNC: 24.6 MMOL/L (ref 22–29)
CREAT SERPL-MCNC: 1.06 MG/DL (ref 0.76–1.27)
CREAT UR-MCNC: 72.9 MG/DL
GLOBULIN SER CALC-MCNC: 2.6 GM/DL
GLUCOSE SERPL-MCNC: 132 MG/DL (ref 65–99)
HBA1C MFR BLD: 7.1 % (ref 4.8–5.6)
HCT VFR BLD AUTO: 48.5 % (ref 40.4–52.2)
HDLC SERPL-MCNC: 43 MG/DL (ref 40–60)
HGB BLD-MCNC: 15.9 G/DL (ref 13.7–17.6)
INSULIN SERPL-ACNC: 5.4 UIU/ML (ref 2.6–24.9)
INTERPRETATION: NORMAL
LDLC SERPL CALC-MCNC: 91 MG/DL (ref 0–100)
Lab: NORMAL
MICROALBUMIN UR-MCNC: <3 UG/ML
POTASSIUM SERPL-SCNC: 4.7 MMOL/L (ref 3.5–5.2)
PROT SERPL-MCNC: 6.9 G/DL (ref 6–8.5)
SODIUM SERPL-SCNC: 139 MMOL/L (ref 136–145)
T3FREE SERPL-MCNC: 3.3 PG/ML (ref 2–4.4)
T4 FREE SERPL-MCNC: 1.25 NG/DL (ref 0.93–1.7)
TESTOST FREE SERPL-MCNC: 7.5 PG/ML (ref 6.6–18.1)
TESTOST SERPL-MCNC: 353 NG/DL (ref 264–916)
THYROGLOB AB SERPL-ACNC: <1 IU/ML (ref 0–0.9)
THYROPEROXIDASE AB SERPL-ACNC: 10 IU/ML (ref 0–34)
TRIGL SERPL-MCNC: 103 MG/DL (ref 0–150)
TSH SERPL DL<=0.005 MIU/L-ACNC: 1.95 MIU/ML (ref 0.27–4.2)
URATE SERPL-MCNC: 5.1 MG/DL (ref 3.4–7)
VLDLC SERPL CALC-MCNC: 20.6 MG/DL (ref 5–40)

## 2018-10-19 RX ORDER — TESTOSTERONE CYPIONATE 200 MG/ML
INJECTION, SOLUTION INTRAMUSCULAR
Qty: 2 ML | Refills: 0 | Status: SHIPPED | OUTPATIENT
Start: 2018-10-19 | End: 2018-10-30 | Stop reason: SDUPTHER

## 2018-10-30 ENCOUNTER — OFFICE VISIT (OUTPATIENT)
Dept: ENDOCRINOLOGY | Age: 68
End: 2018-10-30

## 2018-10-30 VITALS
HEIGHT: 64 IN | DIASTOLIC BLOOD PRESSURE: 68 MMHG | SYSTOLIC BLOOD PRESSURE: 122 MMHG | WEIGHT: 172.6 LBS | BODY MASS INDEX: 29.47 KG/M2

## 2018-10-30 DIAGNOSIS — E78.2 MIXED HYPERLIPIDEMIA: ICD-10-CM

## 2018-10-30 DIAGNOSIS — E29.1 MALE HYPOGONADISM: ICD-10-CM

## 2018-10-30 DIAGNOSIS — R53.82 CHRONIC FATIGUE: ICD-10-CM

## 2018-10-30 DIAGNOSIS — IMO0002 UNCONTROLLED TYPE 2 DIABETES MELLITUS WITH COMPLICATION, WITHOUT LONG-TERM CURRENT USE OF INSULIN: Primary | ICD-10-CM

## 2018-10-30 DIAGNOSIS — E55.9 VITAMIN D DEFICIENCY: ICD-10-CM

## 2018-10-30 PROCEDURE — 99214 OFFICE O/P EST MOD 30 MIN: CPT | Performed by: NURSE PRACTITIONER

## 2018-11-04 RX ORDER — TESTOSTERONE CYPIONATE 200 MG/ML
100 INJECTION, SOLUTION INTRAMUSCULAR
Qty: 2 ML | Refills: 0 | Status: SHIPPED | OUTPATIENT
Start: 2018-11-04 | End: 2019-02-08 | Stop reason: SDUPTHER

## 2018-11-04 RX ORDER — EMPAGLIFLOZIN 25 MG/1
25 TABLET, FILM COATED ORAL EVERY MORNING
Qty: 90 TABLET | Refills: 1 | Status: SHIPPED | OUTPATIENT
Start: 2018-11-04 | End: 2019-01-15 | Stop reason: SDUPTHER

## 2018-11-09 ENCOUNTER — EPISODE CHANGES (OUTPATIENT)
Dept: CASE MANAGEMENT | Facility: OTHER | Age: 68
End: 2018-11-09

## 2018-11-27 ENCOUNTER — OFFICE VISIT (OUTPATIENT)
Dept: INTERNAL MEDICINE | Facility: CLINIC | Age: 68
End: 2018-11-27

## 2018-11-27 VITALS
HEART RATE: 70 BPM | DIASTOLIC BLOOD PRESSURE: 74 MMHG | HEIGHT: 64 IN | OXYGEN SATURATION: 99 % | WEIGHT: 172 LBS | RESPIRATION RATE: 16 BRPM | SYSTOLIC BLOOD PRESSURE: 118 MMHG | TEMPERATURE: 97 F | BODY MASS INDEX: 29.37 KG/M2

## 2018-11-27 DIAGNOSIS — E11.9 NON-INSULIN DEPENDENT TYPE 2 DIABETES MELLITUS (HCC): Primary | ICD-10-CM

## 2018-11-27 PROCEDURE — 99212 OFFICE O/P EST SF 10 MIN: CPT | Performed by: INTERNAL MEDICINE

## 2018-11-27 RX ORDER — MULTIVIT-MIN/IRON/FOLIC ACID/K 18-600-40
CAPSULE ORAL DAILY
COMMUNITY
End: 2019-07-16

## 2018-12-11 NOTE — PROGRESS NOTES
Subjective   Brian Garcia is a 68 y.o. male.   He is here to follow-up for NIDDM which is much more stable now than it was previously  History of Present Illness   He is here to follow-up for NIDDM which is stable on current medication at this time  The following portions of the patient's history were reviewed and updated as appropriate: allergies, current medications, past family history, past medical history, past social history, past surgical history and problem list.    Review of Systems   Endocrine: Negative for polydipsia and polyuria.   All other systems reviewed and are negative.      Objective   Physical Exam   Constitutional: He is oriented to person, place, and time. He appears well-developed and well-nourished. He is cooperative.   HENT:   Head: Normocephalic and atraumatic.   Right Ear: Hearing, tympanic membrane, external ear and ear canal normal.   Left Ear: Hearing, tympanic membrane, external ear and ear canal normal.   Nose: Nose normal.   Mouth/Throat: Uvula is midline, oropharynx is clear and moist and mucous membranes are normal.   Eyes: Conjunctivae, EOM and lids are normal. Pupils are equal, round, and reactive to light.   Neck: Phonation normal. Neck supple. Carotid bruit is not present.   Cardiovascular: Normal rate, regular rhythm and normal heart sounds. Exam reveals no gallop and no friction rub.   No murmur heard.  Pulmonary/Chest: Effort normal and breath sounds normal. No respiratory distress.   Abdominal: Soft. Bowel sounds are normal. He exhibits no distension and no mass. There is no hepatosplenomegaly. There is no tenderness. There is no rebound and no guarding. No hernia.   Musculoskeletal: He exhibits no edema.   Neurological: He is alert and oriented to person, place, and time. Coordination and gait normal.   Skin: Skin is warm and dry.   Psychiatric: He has a normal mood and affect. His speech is normal and behavior is normal. Judgment and thought content normal.   Nursing note  and vitals reviewed.      Assessment/Plan   Diagnoses and all orders for this visit:    Non-insulin dependent type 2 diabetes mellitus (CMS/[facility])    NIDDM stable on current medication with no changes needed at this time

## 2019-01-03 RX ORDER — ERGOCALCIFEROL 1.25 MG/1
CAPSULE ORAL
Qty: 12 CAPSULE | Refills: 1 | Status: SHIPPED | OUTPATIENT
Start: 2019-01-03 | End: 2019-02-27 | Stop reason: SDUPTHER

## 2019-01-15 DIAGNOSIS — IMO0002 UNCONTROLLED TYPE 2 DIABETES MELLITUS WITH COMPLICATION, WITHOUT LONG-TERM CURRENT USE OF INSULIN: ICD-10-CM

## 2019-01-15 RX ORDER — EMPAGLIFLOZIN 25 MG/1
TABLET, FILM COATED ORAL
Qty: 90 TABLET | Refills: 1 | Status: SHIPPED | OUTPATIENT
Start: 2019-01-15 | End: 2019-02-27 | Stop reason: SDUPTHER

## 2019-02-08 DIAGNOSIS — IMO0002 UNCONTROLLED TYPE 2 DIABETES MELLITUS WITH COMPLICATION, WITHOUT LONG-TERM CURRENT USE OF INSULIN: ICD-10-CM

## 2019-02-08 DIAGNOSIS — R53.82 CHRONIC FATIGUE: ICD-10-CM

## 2019-02-08 DIAGNOSIS — E29.1 MALE HYPOGONADISM: ICD-10-CM

## 2019-02-08 DIAGNOSIS — N52.9 ED (ERECTILE DYSFUNCTION) OF ORGANIC ORIGIN: ICD-10-CM

## 2019-02-08 DIAGNOSIS — E78.2 MIXED HYPERLIPIDEMIA: Primary | ICD-10-CM

## 2019-02-08 DIAGNOSIS — E55.9 VITAMIN D DEFICIENCY: ICD-10-CM

## 2019-02-13 ENCOUNTER — LAB (OUTPATIENT)
Dept: ENDOCRINOLOGY | Age: 69
End: 2019-02-13

## 2019-02-13 DIAGNOSIS — N52.9 ED (ERECTILE DYSFUNCTION) OF ORGANIC ORIGIN: ICD-10-CM

## 2019-02-13 DIAGNOSIS — IMO0002 UNCONTROLLED TYPE 2 DIABETES MELLITUS WITH COMPLICATION, WITHOUT LONG-TERM CURRENT USE OF INSULIN: ICD-10-CM

## 2019-02-13 DIAGNOSIS — E29.1 MALE HYPOGONADISM: ICD-10-CM

## 2019-02-13 DIAGNOSIS — R53.82 CHRONIC FATIGUE: ICD-10-CM

## 2019-02-13 DIAGNOSIS — E55.9 VITAMIN D DEFICIENCY: ICD-10-CM

## 2019-02-13 DIAGNOSIS — E78.2 MIXED HYPERLIPIDEMIA: ICD-10-CM

## 2019-02-13 RX ORDER — TESTOSTERONE CYPIONATE 200 MG/ML
100 INJECTION, SOLUTION INTRAMUSCULAR
Qty: 2 ML | Refills: 0 | Status: SHIPPED | OUTPATIENT
Start: 2019-02-13 | End: 2019-02-27 | Stop reason: SDUPTHER

## 2019-02-15 LAB
25(OH)D3+25(OH)D2 SERPL-MCNC: 42.9 NG/ML (ref 30–100)
ALBUMIN SERPL-MCNC: 4.6 G/DL (ref 3.6–4.8)
ALBUMIN/GLOB SERPL: 1.9 {RATIO} (ref 1.2–2.2)
ALP SERPL-CCNC: 28 IU/L (ref 39–117)
ALT SERPL-CCNC: 22 IU/L (ref 0–44)
AST SERPL-CCNC: 17 IU/L (ref 0–40)
BILIRUB SERPL-MCNC: 0.6 MG/DL (ref 0–1.2)
BUN SERPL-MCNC: 19 MG/DL (ref 8–27)
BUN/CREAT SERPL: 17 (ref 10–24)
C PEPTIDE SERPL-MCNC: 1.8 NG/ML (ref 1.1–4.4)
CALCIUM SERPL-MCNC: 9.6 MG/DL (ref 8.6–10.2)
CHLORIDE SERPL-SCNC: 100 MMOL/L (ref 96–106)
CHOLEST SERPL-MCNC: 172 MG/DL (ref 100–199)
CO2 SERPL-SCNC: 24 MMOL/L (ref 20–29)
CREAT SERPL-MCNC: 1.14 MG/DL (ref 0.76–1.27)
GLOBULIN SER CALC-MCNC: 2.4 G/DL (ref 1.5–4.5)
GLUCOSE SERPL-MCNC: 126 MG/DL (ref 65–99)
HBA1C MFR BLD: 7.5 % (ref 4.8–5.6)
HCT VFR BLD AUTO: 49.7 % (ref 37.5–51)
HDLC SERPL-MCNC: 38 MG/DL
HGB BLD-MCNC: 16.8 G/DL (ref 13–17.7)
INTERPRETATION: NORMAL
LDLC SERPL CALC-MCNC: 110 MG/DL (ref 0–99)
Lab: NORMAL
MICROALBUMIN UR-MCNC: 5.8 UG/ML
POTASSIUM SERPL-SCNC: 4.6 MMOL/L (ref 3.5–5.2)
PROT SERPL-MCNC: 7 G/DL (ref 6–8.5)
SHBG SERPL-SCNC: 33.6 NMOL/L (ref 19.3–76.4)
SODIUM SERPL-SCNC: 138 MMOL/L (ref 134–144)
T4 SERPL-MCNC: 7.7 UG/DL (ref 4.5–12)
TESTOST FREE SERPL-MCNC: 14.1 PG/ML (ref 6.6–18.1)
TESTOST SERPL-MCNC: 625 NG/DL (ref 264–916)
TRIGL SERPL-MCNC: 118 MG/DL (ref 0–149)
TSH SERPL DL<=0.005 MIU/L-ACNC: 1.87 UIU/ML (ref 0.45–4.5)
URATE SERPL-MCNC: 5.2 MG/DL (ref 3.7–8.6)
VLDLC SERPL CALC-MCNC: 24 MG/DL (ref 5–40)

## 2019-02-27 ENCOUNTER — OFFICE VISIT (OUTPATIENT)
Dept: ENDOCRINOLOGY | Age: 69
End: 2019-02-27

## 2019-02-27 VITALS
HEIGHT: 64 IN | WEIGHT: 171.8 LBS | RESPIRATION RATE: 16 BRPM | BODY MASS INDEX: 29.33 KG/M2 | SYSTOLIC BLOOD PRESSURE: 122 MMHG | DIASTOLIC BLOOD PRESSURE: 82 MMHG

## 2019-02-27 DIAGNOSIS — N52.9 ED (ERECTILE DYSFUNCTION) OF ORGANIC ORIGIN: ICD-10-CM

## 2019-02-27 DIAGNOSIS — E78.2 MIXED HYPERLIPIDEMIA: ICD-10-CM

## 2019-02-27 DIAGNOSIS — N40.0 BENIGN PROSTATIC HYPERPLASIA WITHOUT LOWER URINARY TRACT SYMPTOMS: ICD-10-CM

## 2019-02-27 DIAGNOSIS — R79.89 LOW TESTOSTERONE: ICD-10-CM

## 2019-02-27 DIAGNOSIS — E55.9 VITAMIN D DEFICIENCY: ICD-10-CM

## 2019-02-27 DIAGNOSIS — E29.1 MALE HYPOGONADISM: ICD-10-CM

## 2019-02-27 DIAGNOSIS — R53.82 CHRONIC FATIGUE: ICD-10-CM

## 2019-02-27 DIAGNOSIS — IMO0002 UNCONTROLLED TYPE 2 DIABETES MELLITUS WITH COMPLICATION, WITHOUT LONG-TERM CURRENT USE OF INSULIN: Primary | ICD-10-CM

## 2019-02-27 PROCEDURE — 99215 OFFICE O/P EST HI 40 MIN: CPT | Performed by: INTERNAL MEDICINE

## 2019-02-27 RX ORDER — EMPAGLIFLOZIN 25 MG/1
1 TABLET, FILM COATED ORAL EVERY MORNING
Qty: 90 TABLET | Refills: 3 | Status: SHIPPED | OUTPATIENT
Start: 2019-02-27 | End: 2019-06-27 | Stop reason: SDUPTHER

## 2019-02-27 RX ORDER — TADALAFIL 5 MG/1
TABLET ORAL
Qty: 30 TABLET | Refills: 5 | Status: SHIPPED | OUTPATIENT
Start: 2019-02-27 | End: 2019-06-27 | Stop reason: SDUPTHER

## 2019-02-27 RX ORDER — TESTOSTERONE CYPIONATE 200 MG/ML
100 INJECTION, SOLUTION INTRAMUSCULAR
Qty: 2 ML | Refills: 3 | Status: SHIPPED | OUTPATIENT
Start: 2019-02-27 | End: 2019-06-27

## 2019-02-27 RX ORDER — ERGOCALCIFEROL 1.25 MG/1
CAPSULE ORAL
Qty: 13 CAPSULE | Refills: 3 | Status: SHIPPED | OUTPATIENT
Start: 2019-02-27 | End: 2019-06-27 | Stop reason: SDUPTHER

## 2019-02-27 RX ORDER — SEMAGLUTIDE 1.34 MG/ML
1 INJECTION, SOLUTION SUBCUTANEOUS WEEKLY
Qty: 2 PEN | Refills: 5 | Status: SHIPPED | OUTPATIENT
Start: 2019-02-27 | End: 2019-06-27 | Stop reason: SDUPTHER

## 2019-02-27 NOTE — PROGRESS NOTES
"Subjective   Brian Garcia is a 68 y.o. male seen for follow up for DM2, hyperlipidemia, HTN, lab review. Patient is checking BG once a day in the AM. No BG readings. He denies any problems or concerns.     History of Present Illness this is a 68-year-old gentleman known patient with type 2 diabetes hypertension and dyslipidemia as well as vitamin D deficiency.  Over the course of last 6 months he has had no significant health problems for which to go to the ER or hospital.    /82   Resp 16   Ht 162.6 cm (64\")   Wt 77.9 kg (171 lb 12.8 oz)   BMI 29.49 kg/m²      Allergies   Allergen Reactions   • Codeine    • Invokana [Canagliflozin]      Caused floaters in both eyes    • Metformin And Related GI Intolerance     GI   • Penicillins    • Sulfa Antibiotics    • Tramadol        Current Outpatient Medications:   •  Ascorbic Acid (VITAMIN C) 500 MG capsule, Take  by mouth Daily., Disp: , Rfl:   •  aspirin 81 MG tablet, Take 81 mg by mouth daily., Disp: , Rfl:   •  fluticasone (FLONASE) 50 MCG/ACT nasal spray, 2 sprays into each nostril Daily., Disp: 5 bottle, Rfl: 5  •  fluticasone (VERAMYST) 27.5 MCG/SPRAY nasal spray, 2 sprays into each nostril Daily., Disp: , Rfl:   •  guaiFENesin (MUCINEX) 600 MG 12 hr tablet, Take 1,200 mg by mouth 2 (Two) Times a Day., Disp: , Rfl:   •  JARDIANCE 25 MG tablet, TAKE 1 TABLET BY MOUTH EVERY MORNING, Disp: 90 tablet, Rfl: 1  •  Needle, Disp, 22G X 1\" misc, 1 inj weekly, Disp: 12 each, Rfl: 4  •  predniSONE (DELTASONE) 20 MG tablet, Take 1 tablet by mouth 2 (Two) Times a Day., Disp: 10 tablet, Rfl: 0  •  SITagliptin (JANUVIA) 100 MG tablet, Take 1 tablet by mouth Daily., Disp: 90 tablet, Rfl: 1  •  Syringe 18G X 1-1/2\" 3 ML misc, 1 inj every week, Disp: 12 each, Rfl: 4  •  tadalafil (CIALIS) 5 MG tablet, Take 1 daily for BPH, Disp: 30 tablet, Rfl: 5  •  Testosterone Cypionate (DEPOTESTOTERONE CYPIONATE) 200 MG/ML injection, INJECT 0.5 ML INTO THE APPROPRIATE MUSCLE AS DIRECTED " BY PRESCRIBER EVERY 14 (FOURTEEN) DAYS., Disp: 2 mL, Rfl: 0  •  vitamin D (ERGOCALCIFEROL) 94514 units capsule capsule, TAKE ONE CAPSULE BY MOUTH ONCE A MONTH, Disp: 12 capsule, Rfl: 1      The following portions of the patient's history were reviewed and updated as appropriate: allergies, current medications, past family history, past medical history, past social history, past surgical history and problem list.    Review of Systems   Constitutional: Negative.    HENT: Negative.    Eyes: Negative.    Respiratory: Negative.    Cardiovascular: Negative.    Gastrointestinal: Negative.    Endocrine: Negative.    Genitourinary: Negative.    Musculoskeletal: Negative.    Skin: Negative.    Allergic/Immunologic: Negative.    Neurological: Negative.    Hematological: Negative.    Psychiatric/Behavioral: Negative.        Objective   Physical Exam   Constitutional: He is oriented to person, place, and time. He appears well-developed and well-nourished. He is cooperative. No distress.   HENT:   Head: Normocephalic and atraumatic.   Right Ear: Hearing, tympanic membrane, external ear and ear canal normal.   Left Ear: Hearing, tympanic membrane, external ear and ear canal normal.   Nose: Nose normal.   Mouth/Throat: Uvula is midline, oropharynx is clear and moist and mucous membranes are normal. No oropharyngeal exudate.   Eyes: Conjunctivae, EOM and lids are normal. Pupils are equal, round, and reactive to light. Right eye exhibits no discharge. Left eye exhibits no discharge. No scleral icterus.   Neck: Normal range of motion and phonation normal. Neck supple. No JVD present. Carotid bruit is not present. No tracheal deviation present. No thyromegaly present.   Cardiovascular: Normal rate, regular rhythm and normal heart sounds. Exam reveals no gallop and no friction rub.   No murmur heard.  Pulmonary/Chest: Effort normal and breath sounds normal. No stridor. No respiratory distress. He has no wheezes. He has no rales. He  exhibits no tenderness.   Abdominal: Soft. Bowel sounds are normal. He exhibits no distension and no mass. There is no hepatosplenomegaly. There is no tenderness. There is no rebound and no guarding. No hernia.   Musculoskeletal: Normal range of motion. He exhibits no edema, tenderness or deformity.   Lymphadenopathy:     He has no cervical adenopathy.   Neurological: He is alert and oriented to person, place, and time. He displays normal reflexes. No cranial nerve deficit or sensory deficit. He exhibits normal muscle tone. Coordination and gait normal.   Skin: Skin is warm and dry. No rash noted. He is not diaphoretic. No erythema. No pallor.   Psychiatric: He has a normal mood and affect. His speech is normal and behavior is normal. Judgment and thought content normal.   Nursing note and vitals reviewed.       Results for orders placed or performed in visit on 02/13/19   TestT+TestF+SHBG   Result Value Ref Range    Testosterone, Total 625 264 - 916 ng/dL    Testosterone, Free 14.1 6.6 - 18.1 pg/mL    Sex Hormone Binding Globulin 33.6 19.3 - 76.4 nmol/L   Hemoglobin & Hematocrit, Blood   Result Value Ref Range    Hemoglobin 16.8 13.0 - 17.7 g/dL    Hematocrit 49.7 37.5 - 51.0 %   T4 & TSH ([facility])   Result Value Ref Range    TSH 1.870 0.450 - 4.500 uIU/mL    T4, Total 7.7 4.5 - 12.0 ug/dL   Vitamin D 25 Hydroxy   Result Value Ref Range    25 Hydroxy, Vitamin D 42.9 30.0 - 100.0 ng/mL   Uric Acid   Result Value Ref Range    Uric Acid 5.2 3.7 - 8.6 mg/dL   MicroAlbumin, Urine, Random - Urine, Clean Catch   Result Value Ref Range    Microalbumin, Urine 5.8 Not Estab. ug/mL   Lipid Panel   Result Value Ref Range    Total Cholesterol 172 100 - 199 mg/dL    Triglycerides 118 0 - 149 mg/dL    HDL Cholesterol 38 (L) >39 mg/dL    VLDL Cholesterol 24 5 - 40 mg/dL    LDL Cholesterol  110 (H) 0 - 99 mg/dL   Hemoglobin A1c   Result Value Ref Range    Hemoglobin A1C 7.5 (H) 4.8 - 5.6 %   C-Peptide   Result Value Ref Range     C-Peptide 1.8 1.1 - 4.4 ng/mL   Comprehensive Metabolic Panel   Result Value Ref Range    Glucose 126 (H) 65 - 99 mg/dL    BUN 19 8 - 27 mg/dL    Creatinine 1.14 0.76 - 1.27 mg/dL    eGFR Non African Am 66 >59 mL/min/1.73    eGFR African Am 77 >59 mL/min/1.73    BUN/Creatinine Ratio 17 10 - 24    Sodium 138 134 - 144 mmol/L    Potassium 4.6 3.5 - 5.2 mmol/L    Chloride 100 96 - 106 mmol/L    Total CO2 24 20 - 29 mmol/L    Calcium 9.6 8.6 - 10.2 mg/dL    Total Protein 7.0 6.0 - 8.5 g/dL    Albumin 4.6 3.6 - 4.8 g/dL    Globulin 2.4 1.5 - 4.5 g/dL    A/G Ratio 1.9 1.2 - 2.2    Total Bilirubin 0.6 0.0 - 1.2 mg/dL    Alkaline Phosphatase 28 (L) 39 - 117 IU/L    AST (SGOT) 17 0 - 40 IU/L    ALT (SGPT) 22 0 - 44 IU/L   Cardiovascular Risk Assessment   Result Value Ref Range    Interpretation Note    Diabetes Patient Education   Result Value Ref Range    PDF Image Not applicable          Assessment/Plan   Diagnoses and all orders for this visit:    Uncontrolled type 2 diabetes mellitus with complication, without long-term current use of insulin (CMS/[facility])  -     JARDIANCE 25 MG tablet; Take 25 mg by mouth Every Morning.  -     T4 & TSH ([facility]); Future  -     TestT+TestF+SHBG; Future  -     Uric Acid; Future  -     Vitamin D 25 Hydroxy; Future  -     Comprehensive Metabolic Panel; Future  -     C-Peptide; Future  -     Hemoglobin A1c; Future  -     Lipid Panel; Future  -     MicroAlbumin, Urine, Random - Urine, Clean Catch; Future  -     PSA DIAGNOSTIC; Future  -     Hemoglobin & Hematocrit, Blood; Future    Mixed hyperlipidemia  -     T4 & TSH ([facility]); Future  -     TestT+TestF+SHBG; Future  -     Uric Acid; Future  -     Vitamin D 25 Hydroxy; Future  -     Comprehensive Metabolic Panel; Future  -     C-Peptide; Future  -     Hemoglobin A1c; Future  -     Lipid Panel; Future  -     MicroAlbumin, Urine, Random - Urine, Clean Catch; Future  -     PSA DIAGNOSTIC; Future  -     Hemoglobin & Hematocrit, Blood;  "Future    Vitamin D deficiency  -     T4 & TSH ([facility]); Future  -     TestT+TestF+SHBG; Future  -     Uric Acid; Future  -     Vitamin D 25 Hydroxy; Future  -     Comprehensive Metabolic Panel; Future  -     C-Peptide; Future  -     Hemoglobin A1c; Future  -     Lipid Panel; Future  -     MicroAlbumin, Urine, Random - Urine, Clean Catch; Future  -     PSA DIAGNOSTIC; Future  -     Hemoglobin & Hematocrit, Blood; Future    ED (erectile dysfunction) of organic origin  -     T4 & TSH ([facility]); Future  -     TestT+TestF+SHBG; Future  -     Uric Acid; Future  -     Vitamin D 25 Hydroxy; Future  -     Comprehensive Metabolic Panel; Future  -     C-Peptide; Future  -     Hemoglobin A1c; Future  -     Lipid Panel; Future  -     MicroAlbumin, Urine, Random - Urine, Clean Catch; Future  -     PSA DIAGNOSTIC; Future  -     Hemoglobin & Hematocrit, Blood; Future    Chronic fatigue  -     T4 & TSH ([facility]); Future  -     TestT+TestF+SHBG; Future  -     Uric Acid; Future  -     Vitamin D 25 Hydroxy; Future  -     Comprehensive Metabolic Panel; Future  -     C-Peptide; Future  -     Hemoglobin A1c; Future  -     Lipid Panel; Future  -     MicroAlbumin, Urine, Random - Urine, Clean Catch; Future  -     PSA DIAGNOSTIC; Future  -     Hemoglobin & Hematocrit, Blood; Future    Low testosterone  -     T4 & TSH ([facility]); Future  -     TestT+TestF+SHBG; Future  -     Uric Acid; Future  -     Vitamin D 25 Hydroxy; Future  -     Comprehensive Metabolic Panel; Future  -     C-Peptide; Future  -     Hemoglobin A1c; Future  -     Lipid Panel; Future  -     MicroAlbumin, Urine, Random - Urine, Clean Catch; Future  -     PSA DIAGNOSTIC; Future  -     Hemoglobin & Hematocrit, Blood; Future    Male hypogonadism  -     Testosterone Cypionate (DEPOTESTOTERONE CYPIONATE) 200 MG/ML injection; Inject 0.5 mL into the appropriate muscle as directed by prescriber Every 14 (Fourteen) Days.  -     Syringe 18G X 1-1/2\" 3 ML misc; 1 inj every week  -     " T4 & TSH ([facility]); Future  -     TestT+TestF+SHBG; Future  -     Uric Acid; Future  -     Vitamin D 25 Hydroxy; Future  -     Comprehensive Metabolic Panel; Future  -     C-Peptide; Future  -     Hemoglobin A1c; Future  -     Lipid Panel; Future  -     MicroAlbumin, Urine, Random - Urine, Clean Catch; Future  -     PSA DIAGNOSTIC; Future  -     Hemoglobin & Hematocrit, Blood; Future    Benign prostatic hyperplasia without lower urinary tract symptoms  -     T4 & TSH ([facility]); Future  -     TestT+TestF+SHBG; Future  -     Uric Acid; Future  -     Vitamin D 25 Hydroxy; Future  -     Comprehensive Metabolic Panel; Future  -     C-Peptide; Future  -     Hemoglobin A1c; Future  -     Lipid Panel; Future  -     MicroAlbumin, Urine, Random - Urine, Clean Catch; Future  -     PSA DIAGNOSTIC; Future  -     Hemoglobin & Hematocrit, Blood; Future    Other orders  -     OZEMPIC 1 MG/DOSE solution pen-injector; Inject 1 mg under the skin into the appropriate area as directed 1 (One) Time Per Week.  -     vitamin D (ERGOCALCIFEROL) 63269 units capsule capsule; 1 capsule once a week  -     tadalafil (CIALIS) 5 MG tablet; Take 1 daily for BPH             In summary I saw and examined this 68-year-old gentleman for above-mentioned problems.  I reviewed his laboratory evaluations of February 13, 2019 and provided him with a hard copy of it.  He is overall clinically and metabolically stable and therefore we will go ahead and continue all his current prescriptions.  However because of the fact that his hemoglobin A1c is 7.5 we will go ahead and continue Jardiance however I am going to stop Januvia and will start him on 0.25 mg weekly for 2 weeks, 0.50 mg weekly for 2 weeks and then 1 mg/week moving forward as his maintenance dose.  This office visit lasted 40 minutes with a 25-minute of it being a spent on face-to-face patient counseling and education and planning the future of his care moving forward.  He will see Ms. Harriet  Samy in 4 months or sooner if needed with laboratory evaluation prior to each office visit.

## 2019-04-24 ENCOUNTER — TELEPHONE (OUTPATIENT)
Dept: ENDOCRINOLOGY | Age: 69
End: 2019-04-24

## 2019-04-24 NOTE — TELEPHONE ENCOUNTER
----- Message from Mikaela Briggs MA sent at 4/24/2019  9:48 AM EDT -----  Contact: patient      ----- Message -----  From: Harriet Pablo APRN  Sent: 4/23/2019   4:35 PM  To: Mikaela Briggs MA    Go to the lower dose of ozempic  Add benefiber per manufactor advice - appr 2 tablespoon 2 times daily  ----- Message -----  From: Mikaela Briggs MA  Sent: 4/23/2019   3:54 PM  To: LUX Hong        ----- Message -----  From: Mishel Neves MA  Sent: 4/23/2019   3:51 PM  To: Mikaela Briggs MA    Will you ask Harriet if she will address this?  ----- Message -----  From: Jessica Weiss  Sent: 4/22/2019  11:03 AM  To: Mishel Neves MA    Med was switched to ozempic    He is up to the full dose he has a lot of constipation    Can he get something to help the constipation  Or can he have an alternative     Spoke to patient. He expressed understanding.

## 2019-06-13 ENCOUNTER — RESULTS ENCOUNTER (OUTPATIENT)
Dept: ENDOCRINOLOGY | Age: 69
End: 2019-06-13

## 2019-06-13 ENCOUNTER — LAB (OUTPATIENT)
Dept: ENDOCRINOLOGY | Age: 69
End: 2019-06-13

## 2019-06-13 DIAGNOSIS — R79.89 LOW TESTOSTERONE: ICD-10-CM

## 2019-06-13 DIAGNOSIS — E29.1 MALE HYPOGONADISM: ICD-10-CM

## 2019-06-13 DIAGNOSIS — E78.2 MIXED HYPERLIPIDEMIA: ICD-10-CM

## 2019-06-13 DIAGNOSIS — IMO0002 UNCONTROLLED TYPE 2 DIABETES MELLITUS WITH COMPLICATION, WITHOUT LONG-TERM CURRENT USE OF INSULIN: ICD-10-CM

## 2019-06-13 DIAGNOSIS — N40.0 BENIGN PROSTATIC HYPERPLASIA WITHOUT LOWER URINARY TRACT SYMPTOMS: ICD-10-CM

## 2019-06-13 DIAGNOSIS — N52.9 ED (ERECTILE DYSFUNCTION) OF ORGANIC ORIGIN: ICD-10-CM

## 2019-06-13 DIAGNOSIS — E55.9 VITAMIN D DEFICIENCY: ICD-10-CM

## 2019-06-13 DIAGNOSIS — R53.82 CHRONIC FATIGUE: ICD-10-CM

## 2019-06-13 DIAGNOSIS — E78.2 MIXED HYPERLIPIDEMIA: Primary | ICD-10-CM

## 2019-06-15 LAB
25(OH)D3+25(OH)D2 SERPL-MCNC: 48 NG/ML (ref 30–100)
ALBUMIN SERPL-MCNC: 4.6 G/DL (ref 3.5–5.2)
ALBUMIN/GLOB SERPL: 2 G/DL
ALP SERPL-CCNC: 29 U/L (ref 39–117)
ALT SERPL-CCNC: 18 U/L (ref 1–41)
AST SERPL-CCNC: 13 U/L (ref 1–40)
BASOPHILS # BLD AUTO: 0.05 10*3/MM3 (ref 0–0.2)
BASOPHILS NFR BLD AUTO: 0.8 % (ref 0–1.5)
BILIRUB SERPL-MCNC: 0.5 MG/DL (ref 0.2–1.2)
BUN SERPL-MCNC: 16 MG/DL (ref 8–23)
BUN/CREAT SERPL: 14.7 (ref 7–25)
C PEPTIDE SERPL-MCNC: 2.1 NG/ML (ref 1.1–4.4)
CALCIUM SERPL-MCNC: 9.7 MG/DL (ref 8.6–10.5)
CHLORIDE SERPL-SCNC: 102 MMOL/L (ref 98–107)
CHOLEST SERPL-MCNC: 159 MG/DL (ref 0–200)
CO2 SERPL-SCNC: 27.3 MMOL/L (ref 22–29)
CREAT SERPL-MCNC: 1.09 MG/DL (ref 0.76–1.27)
EOSINOPHIL # BLD AUTO: 0.12 10*3/MM3 (ref 0–0.4)
EOSINOPHIL NFR BLD AUTO: 1.8 % (ref 0.3–6.2)
ERYTHROCYTE [DISTWIDTH] IN BLOOD BY AUTOMATED COUNT: 14 % (ref 12.3–15.4)
GLOBULIN SER CALC-MCNC: 2.3 GM/DL
GLUCOSE SERPL-MCNC: 113 MG/DL (ref 65–99)
HBA1C MFR BLD: 6.5 % (ref 4.8–5.6)
HCT VFR BLD AUTO: 53.4 % (ref 37.5–51)
HDLC SERPL-MCNC: 43 MG/DL (ref 40–60)
HGB BLD-MCNC: 16.4 G/DL (ref 13–17.7)
IMM GRANULOCYTES # BLD AUTO: 0.01 10*3/MM3 (ref 0–0.05)
IMM GRANULOCYTES NFR BLD AUTO: 0.2 % (ref 0–0.5)
INTERPRETATION: NORMAL
LDLC SERPL CALC-MCNC: 93 MG/DL (ref 0–100)
LYMPHOCYTES # BLD AUTO: 1.89 10*3/MM3 (ref 0.7–3.1)
LYMPHOCYTES NFR BLD AUTO: 28.7 % (ref 19.6–45.3)
Lab: NORMAL
MCH RBC QN AUTO: 27.5 PG (ref 26.6–33)
MCHC RBC AUTO-ENTMCNC: 30.7 G/DL (ref 31.5–35.7)
MCV RBC AUTO: 89.4 FL (ref 79–97)
MICROALBUMIN UR-MCNC: 4 UG/ML
MONOCYTES # BLD AUTO: 0.63 10*3/MM3 (ref 0.1–0.9)
MONOCYTES NFR BLD AUTO: 9.6 % (ref 5–12)
NEUTROPHILS # BLD AUTO: 3.89 10*3/MM3 (ref 1.7–7)
NEUTROPHILS NFR BLD AUTO: 58.9 % (ref 42.7–76)
NRBC BLD AUTO-RTO: 0 /100 WBC (ref 0–0.2)
PLATELET # BLD AUTO: 220 10*3/MM3 (ref 140–450)
POTASSIUM SERPL-SCNC: 4.9 MMOL/L (ref 3.5–5.2)
PROT SERPL-MCNC: 6.9 G/DL (ref 6–8.5)
RBC # BLD AUTO: 5.97 10*6/MM3 (ref 4.14–5.8)
SODIUM SERPL-SCNC: 140 MMOL/L (ref 136–145)
TESTOST FREE SERPL-MCNC: 8.1 PG/ML (ref 6.6–18.1)
TESTOST SERPL-MCNC: 290 NG/DL (ref 264–916)
TRIGL SERPL-MCNC: 116 MG/DL (ref 0–150)
VLDLC SERPL CALC-MCNC: 23.2 MG/DL
WBC # BLD AUTO: 6.59 10*3/MM3 (ref 3.4–10.8)

## 2019-06-27 ENCOUNTER — OFFICE VISIT (OUTPATIENT)
Dept: ENDOCRINOLOGY | Age: 69
End: 2019-06-27

## 2019-06-27 VITALS
WEIGHT: 162.6 LBS | SYSTOLIC BLOOD PRESSURE: 118 MMHG | DIASTOLIC BLOOD PRESSURE: 68 MMHG | BODY MASS INDEX: 27.76 KG/M2 | HEIGHT: 64 IN

## 2019-06-27 DIAGNOSIS — E78.2 MIXED HYPERLIPIDEMIA: Primary | ICD-10-CM

## 2019-06-27 DIAGNOSIS — IMO0002 UNCONTROLLED TYPE 2 DIABETES MELLITUS WITH COMPLICATION, WITHOUT LONG-TERM CURRENT USE OF INSULIN: ICD-10-CM

## 2019-06-27 DIAGNOSIS — E55.9 VITAMIN D DEFICIENCY: ICD-10-CM

## 2019-06-27 DIAGNOSIS — E29.1 MALE HYPOGONADISM: ICD-10-CM

## 2019-06-27 DIAGNOSIS — Z79.899 MEDICATION MANAGEMENT: ICD-10-CM

## 2019-06-27 PROCEDURE — 99214 OFFICE O/P EST MOD 30 MIN: CPT | Performed by: NURSE PRACTITIONER

## 2019-06-27 RX ORDER — TADALAFIL 5 MG/1
TABLET ORAL
Qty: 30 TABLET | Refills: 5 | Status: SHIPPED | OUTPATIENT
Start: 2019-06-27 | End: 2019-10-08 | Stop reason: SDUPTHER

## 2019-06-27 RX ORDER — ERGOCALCIFEROL 1.25 MG/1
CAPSULE ORAL
Qty: 13 CAPSULE | Refills: 3 | Status: SHIPPED | OUTPATIENT
Start: 2019-06-27 | End: 2019-10-08 | Stop reason: SDUPTHER

## 2019-06-27 RX ORDER — SEMAGLUTIDE 1.34 MG/ML
1 INJECTION, SOLUTION SUBCUTANEOUS WEEKLY
Qty: 2 PEN | Refills: 5 | Status: SHIPPED | OUTPATIENT
Start: 2019-06-27 | End: 2019-09-03 | Stop reason: SDUPTHER

## 2019-06-27 RX ORDER — EMPAGLIFLOZIN 25 MG/1
1 TABLET, FILM COATED ORAL EVERY MORNING
Qty: 90 TABLET | Refills: 3 | Status: SHIPPED | OUTPATIENT
Start: 2019-06-27 | End: 2019-10-08 | Stop reason: SDUPTHER

## 2019-06-27 RX ORDER — TESTOSTERONE 16.2 MG/G
GEL TRANSDERMAL
Qty: 162 G | Refills: 0 | Status: SHIPPED | OUTPATIENT
Start: 2019-06-27 | End: 2019-07-20 | Stop reason: SDUPTHER

## 2019-06-27 NOTE — PROGRESS NOTES
Brian Garcia  presents to the office  for the follow-up appointment for Type 2 diabetes mellitus.     The diabete's condition location is throughout the system with the  clinical course has fluctuated, the severity is Mild, and the modifying/allievating factors are oral medications.  Medications and  Dosages were  reviewed with Brian Garcia and suggested that compliance most of the time.    The patient reports associated symptoms of hyperglycemia have been none and associated symptoms of hypoglycemia have been none, with their  hypoglycemia threshold for symptoms is n/a mg/dl .     The patient is currently on oral medications .      Compliance with blood glucose monitoring: fair.     Meal panning: The patient is using avoidance of concentrated sweets.    The patient is currently taking home blood tests - Blood glucose testin-1 times daily, that are:  fasting- 1st thing in morning before eating or drinking    Last instructions given were:  In summary I saw and examined this 68-year-old gentleman for above-mentioned problems.  I reviewed his laboratory evaluations of 2019 and provided him with a hard copy of it.  He is overall clinically and metabolically stable and therefore we will go ahead and continue all his current prescriptions.  However because of the fact that his hemoglobin A1c is 7.5 we will go ahead and continue Jardiance however I am going to stop Januvia and will start him on 0.25 mg weekly for 2 weeks, 0.50 mg weekly for 2 weeks and then 1 mg/week moving forward as his maintenance dose.  This office visit lasted 40 minutes with a 25-minute of it being a spent on face-to-face patient counseling and education and planning the future of his care moving forward.  He will see Ms. Harrietvenancio Pablo in 4 months or sooner if needed with laboratory evaluation prior to each office visit.    Home blood glucose testing daily: 0-1  FB to 130 mg/dl      Patterns reported per patient are none.      "    The following portions of the patient's history were reviewed and updated as appropriate: current medications, past family history, past medical history, past social history, past surgical history and problem list.      Current Outpatient Medications:   •  Ascorbic Acid (VITAMIN C) 500 MG capsule, Take  by mouth Daily., Disp: , Rfl:   •  aspirin 81 MG tablet, Take 81 mg by mouth daily., Disp: , Rfl:   •  fluticasone (FLONASE) 50 MCG/ACT nasal spray, 2 sprays into each nostril Daily., Disp: 5 bottle, Rfl: 5  •  fluticasone (VERAMYST) 27.5 MCG/SPRAY nasal spray, 2 sprays into each nostril Daily., Disp: , Rfl:   •  guaiFENesin (MUCINEX) 600 MG 12 hr tablet, Take 1,200 mg by mouth 2 (Two) Times a Day., Disp: , Rfl:   •  JARDIANCE 25 MG tablet, Take 25 mg by mouth Every Morning., Disp: 90 tablet, Rfl: 3  •  Needle, Disp, 22G X 1\" misc, 1 inj weekly, Disp: 12 each, Rfl: 4  •  OZEMPIC 1 MG/DOSE solution pen-injector, Inject 1 mg under the skin into the appropriate area as directed 1 (One) Time Per Week., Disp: 2 pen, Rfl: 5  •  predniSONE (DELTASONE) 20 MG tablet, Take 1 tablet by mouth 2 (Two) Times a Day., Disp: 10 tablet, Rfl: 0  •  Syringe 18G X 1-1/2\" 3 ML misc, 1 inj every week, Disp: 12 each, Rfl: 4  •  tadalafil (CIALIS) 5 MG tablet, Take 1 daily for BPH, Disp: 30 tablet, Rfl: 5  •  vitamin D (ERGOCALCIFEROL) 58417 units capsule capsule, 1 capsule once a week, Disp: 13 capsule, Rfl: 3  •  Testosterone (ANDROGEL PUMP) 20.25 MG/ACT (1.62%) gel, 2 pumps to each shoulder/upper chest daily, Disp: 162 g, Rfl: 0    Patient Active Problem List    Diagnosis   • Low testosterone [R79.89]   • Benign prostatic hyperplasia without lower urinary tract symptoms [N40.0]   • Medication management [Z79.899]   • Male hypogonadism [E29.1]   • Refusal of statin medication by patient [Z53.29]   • Uncontrolled type 2 diabetes mellitus with complication, without long-term current use of insulin (CMS/[facility]) [E11.8, E11.65]   • Mixed " "hyperlipidemia [E78.2]   • Vitamin D deficiency [E55.9]   • Welcome to Medicare preventive visit [Z00.00]   • Acute bronchitis [J20.9]   • Acute pharyngitis [J02.9]   • Acute upper respiratory infection [J06.9]   • Allergic rhinitis [J30.9]   • Carpal tunnel syndrome [G56.00]   • Cough [R05]   • Non-insulin dependent type 2 diabetes mellitus (CMS/HCC) [E11.9]   • Alimentary obesity [E66.9]   • Fatigue [R53.83]   • Effusion of knee [M25.469]   • Gonalgia [M25.569]   • ED (erectile dysfunction) of organic origin [N52.9]   • Muscle ache [M79.10]   • Thumb pain [M79.646]       Review of Systems   A comprehensive review of the 14 systems was negative except of listed below:  Endocrine: hyperglycemia     Objective:     Wt Readings from Last 3 Encounters:   06/27/19 73.8 kg (162 lb 9.6 oz)   02/27/19 77.9 kg (171 lb 12.8 oz)   11/27/18 78 kg (172 lb)     Temp Readings from Last 3 Encounters:   11/27/18 97 °F (36.1 °C) (Oral)   09/14/18 96.9 °F (36.1 °C)   06/08/18 98 °F (36.7 °C) (Oral)     BP Readings from Last 3 Encounters:   06/27/19 118/68   02/27/19 122/82   11/27/18 118/74     Pulse Readings from Last 3 Encounters:   11/27/18 70   09/14/18 66   06/08/18 75        /68   Ht 162.6 cm (64.02\")   Wt 73.8 kg (162 lb 9.6 oz)   BMI 27.90 kg/m²        Physical Exam   Constitutional: He is oriented to person, place, and time. He appears well-developed and well-nourished. No distress.   HENT:   Head: Normocephalic and atraumatic.   Eyes: EOM are normal. Pupils are equal, round, and reactive to light.   Neck: Normal range of motion. Neck supple. No thyromegaly present.   Cardiovascular: Normal rate, regular rhythm, normal heart sounds and intact distal pulses.   No murmur heard.  Pulmonary/Chest: Effort normal and breath sounds normal.   Abdominal: Soft. Bowel sounds are normal.   Musculoskeletal: Normal range of motion.   Neurological: He is alert and oriented to person, place, and time.   Skin: Skin is warm and dry. " Capillary refill takes 2 to 3 seconds. He is not diaphoretic.   Psychiatric: He has a normal mood and affect. His behavior is normal. Judgment and thought content normal.   Nursing note and vitals reviewed.          Lab Review  Results for orders placed or performed in visit on 06/13/19   Lipid Panel   Result Value Ref Range    Total Cholesterol 159 0 - 200 mg/dL    Triglycerides 116 0 - 150 mg/dL    HDL Cholesterol 43 40 - 60 mg/dL    VLDL Cholesterol 23.2 mg/dL    LDL Cholesterol  93 0 - 100 mg/dL   Testosterone, Free, Total   Result Value Ref Range    Testosterone, Total 290 264 - 916 ng/dL    Testosterone, Free 8.1 6.6 - 18.1 pg/mL   Vitamin D 25 Hydroxy   Result Value Ref Range    25 Hydroxy, Vitamin D 48.0 30.0 - 100.0 ng/ml   MicroAlbumin, Urine, Random - Urine, Clean Catch   Result Value Ref Range    Microalbumin, Urine 4.0 Not Estab. ug/mL   Hemoglobin A1c   Result Value Ref Range    Hemoglobin A1C 6.50 (H) 4.80 - 5.60 %   C-Peptide   Result Value Ref Range    C-Peptide 2.1 1.1 - 4.4 ng/mL   Comprehensive Metabolic Panel   Result Value Ref Range    Glucose 113 (H) 65 - 99 mg/dL    BUN 16 8 - 23 mg/dL    Creatinine 1.09 0.76 - 1.27 mg/dL    eGFR Non African Am 67 >60 mL/min/1.73    eGFR African Am 82 >60 mL/min/1.73    BUN/Creatinine Ratio 14.7 7.0 - 25.0    Sodium 140 136 - 145 mmol/L    Potassium 4.9 3.5 - 5.2 mmol/L    Chloride 102 98 - 107 mmol/L    Total CO2 27.3 22.0 - 29.0 mmol/L    Calcium 9.7 8.6 - 10.5 mg/dL    Total Protein 6.9 6.0 - 8.5 g/dL    Albumin 4.60 3.50 - 5.20 g/dL    Globulin 2.3 gm/dL    A/G Ratio 2.0 g/dL    Total Bilirubin 0.5 0.2 - 1.2 mg/dL    Alkaline Phosphatase 29 (L) 39 - 117 U/L    AST (SGOT) 13 1 - 40 U/L    ALT (SGPT) 18 1 - 41 U/L   Cardiovascular Risk Assessment   Result Value Ref Range    Interpretation Note    Diabetes Patient Education   Result Value Ref Range    PDF Image Not applicable    CBC & Differential   Result Value Ref Range    WBC 6.59 3.40 - 10.80 10*3/mm3     RBC 5.97 (H) 4.14 - 5.80 10*6/mm3    Hemoglobin 16.4 13.0 - 17.7 g/dL    Hematocrit 53.4 (H) 37.5 - 51.0 %    MCV 89.4 79.0 - 97.0 fL    MCH 27.5 26.6 - 33.0 pg    MCHC 30.7 (L) 31.5 - 35.7 g/dL    RDW 14.0 12.3 - 15.4 %    Platelets 220 140 - 450 10*3/mm3    Neutrophil Rel % 58.9 42.7 - 76.0 %    Lymphocyte Rel % 28.7 19.6 - 45.3 %    Monocyte Rel % 9.6 5.0 - 12.0 %    Eosinophil Rel % 1.8 0.3 - 6.2 %    Basophil Rel % 0.8 0.0 - 1.5 %    Neutrophils Absolute 3.89 1.70 - 7.00 10*3/mm3    Lymphocytes Absolute 1.89 0.70 - 3.10 10*3/mm3    Monocytes Absolute 0.63 0.10 - 0.90 10*3/mm3    Eosinophils Absolute 0.12 0.00 - 0.40 10*3/mm3    Basophils Absolute 0.05 0.00 - 0.20 10*3/mm3    Immature Granulocyte Rel % 0.2 0.0 - 0.5 %    Immature Grans Absolute 0.01 0.00 - 0.05 10*3/mm3    nRBC 0.0 0.0 - 0.2 /100 WBC           Assessment:   Brian was seen today for follow-up.    Diagnoses and all orders for this visit:    Mixed hyperlipidemia  -     Comprehensive Metabolic Panel; Future  -     Lipid Panel; Future    Uncontrolled type 2 diabetes mellitus with complication, without long-term current use of insulin (CMS/[facility])  -     JARDIANCE 25 MG tablet; Take 25 mg by mouth Every Morning.  -     OZEMPIC 1 MG/DOSE solution pen-injector; Inject 1 mg under the skin into the appropriate area as directed 1 (One) Time Per Week.  -     Comprehensive Metabolic Panel; Future  -     C-Peptide; Future  -     Hemoglobin A1c; Future  -     Insulin, Total; Future  -     Lipid Panel; Future  -     Microalbumin / Creatinine Urine Ratio - Urine, Clean Catch; Future  -     Uric Acid; Future  -     Vitamin D 25 Hydroxy; Future  -     TSH; Future  -     Thyroid Antibodies; Future  -     T4, Free; Future  -     T3, Free; Future    Vitamin D deficiency  -     vitamin D (ERGOCALCIFEROL) 69193 units capsule capsule; 1 capsule once a week  -     Comprehensive Metabolic Panel; Future  -     Vitamin D 25 Hydroxy; Future    Male hypogonadism  -      "Testosterone (ANDROGEL PUMP) 20.25 MG/ACT (1.62%) gel; 2 pumps to each shoulder/upper chest daily  -     tadalafil (CIALIS) 5 MG tablet; Take 1 daily for BPH  -     Comprehensive Metabolic Panel; Future  -     Testosterone, Free, Total; Future  -     Testosterone; Future    Medication management  -     Comprehensive Metabolic Panel; Future          Plan:   In summary/ Medication changes: I met with this patient is metabolically stable and doing well at this time.  Laboratory testing was reviewed dated 2019, discussed with questions and answers completed.  Discussed and formulate a treatment plan with patient, patient verbally stated understood all instructions.    1. Diagnoses and all orders for this visit:    Mixed hyperlipidemia- chronic, stable no medication changes at this time. Refills prescribed. Future labs ordered for upcoming appointment and assessment.          Uncontrolled type 2 diabetes mellitus with complication, without long-term current use of insulin (CMS/[facility])- chronic, stable no medication changes at this time. Refills prescribed. Future labs ordered for upcoming appointment and assessment.        Vitamin D deficiency- chronic, stable no medication changes at this time. Refills prescribed. Future labs ordered for upcoming appointment and assessment.      Male hypogonadism- chronic, stable no medication changes at this time. Refills prescribed. Future labs ordered for upcoming appointment and assessment.   Labs - low normal, increased fatigue. Patient requested change from injections to \"gel\" changed to Androgel with labs in 6 weeks.         Instructions:  home blood tests -  Blood glucose testin times daily, that are:  fasting- 1st thing in morning before eating or drinking  before each meal and 1 or 2 hours after meal  bedtime  anytime you feel symptoms of hyperglycemia or hypoglycemia (high or low blood sugars)        Education:  interpretation of lab results, blood sugar goals, " complications of diabetes mellitus, hypoglycemia prevention and treatment, exercise, illness management, self-monitoring of blood glucose skills, nutrition, carbohydrate counting and site rotation        The total face to face time spent was  25 minutes  with additional education given: 14 minutes (greater than 50% of the total time) was spent with counseling and coordination of care on: SMBG with goals, Side effects profiles with medications, medication use and purposes,     Return in about 3 months (around 9/27/2019), or if symptoms worsen or fail to improve, for Recheck. 3 months with Harriet-2 weeks prior for labs 6 months with Dr. Lindsey-2 weeks prior for labs      Dragon transcription disclaimer     Much of this encounter note is an electronic transcription/translation of spoken language to printed text. The electronic translation of spoken language may permit erroneous, or at times, nonsensical words or phrases to be inadvertently transcribed. Although I have reviewed the note for such errors, some may still exist.

## 2019-06-28 ENCOUNTER — PRIOR AUTHORIZATION (OUTPATIENT)
Dept: ENDOCRINOLOGY | Age: 69
End: 2019-06-28

## 2019-07-01 ENCOUNTER — PRIOR AUTHORIZATION (OUTPATIENT)
Dept: ENDOCRINOLOGY | Age: 69
End: 2019-07-01

## 2019-07-16 ENCOUNTER — OFFICE VISIT (OUTPATIENT)
Dept: FAMILY MEDICINE CLINIC | Facility: CLINIC | Age: 69
End: 2019-07-16

## 2019-07-16 VITALS
HEART RATE: 79 BPM | BODY MASS INDEX: 28 KG/M2 | DIASTOLIC BLOOD PRESSURE: 64 MMHG | TEMPERATURE: 97.7 F | SYSTOLIC BLOOD PRESSURE: 102 MMHG | OXYGEN SATURATION: 99 % | HEIGHT: 64 IN | RESPIRATION RATE: 16 BRPM | WEIGHT: 164 LBS

## 2019-07-16 DIAGNOSIS — Z00.00 MEDICARE ANNUAL WELLNESS VISIT, INITIAL: Primary | ICD-10-CM

## 2019-07-16 DIAGNOSIS — E11.9 NON-INSULIN DEPENDENT TYPE 2 DIABETES MELLITUS (HCC): ICD-10-CM

## 2019-07-16 DIAGNOSIS — E78.2 MIXED HYPERLIPIDEMIA: ICD-10-CM

## 2019-07-16 DIAGNOSIS — N40.0 BENIGN PROSTATIC HYPERPLASIA WITHOUT LOWER URINARY TRACT SYMPTOMS: ICD-10-CM

## 2019-07-16 PROBLEM — IMO0002 UNCONTROLLED TYPE 2 DIABETES MELLITUS WITH COMPLICATION, WITHOUT LONG-TERM CURRENT USE OF INSULIN: Status: RESOLVED | Noted: 2017-12-21 | Resolved: 2019-07-16

## 2019-07-16 PROCEDURE — 99214 OFFICE O/P EST MOD 30 MIN: CPT | Performed by: INTERNAL MEDICINE

## 2019-07-16 PROCEDURE — G0438 PPPS, INITIAL VISIT: HCPCS | Performed by: INTERNAL MEDICINE

## 2019-07-16 RX ORDER — LANOLIN ALCOHOL/MO/W.PET/CERES
1000 CREAM (GRAM) TOPICAL DAILY
COMMUNITY
End: 2019-07-16

## 2019-07-16 NOTE — PROGRESS NOTES
Initial Medicare Wellness Visit   The ABC's of the Annual Wellness Visit    Chief Complaint   Patient presents with   • Establish Care     new patient       HPI:  Brian Garcia, -1950, is a 69 y.o. male who presents for an Initial Medicare Wellness Visit.    Recent Hospitalizations:  No hospitalization(s) within the last year..    Current Medical Providers:  Patient Care Team:  Ludwin Reyna MD as PCP - General  Harriet Pablo APRN as PCP - Claims Attributed  Andrey Wolfe MD as Consulting Physician (Internal Medicine)    Health Habits and Functional and Cognitive Screening and Depression Screening:  Functional & Cognitive Status 2019   Do you have difficulty preparing food and eating? No   Do you have difficulty bathing yourself, getting dressed or grooming yourself? No   Do you have difficulty using the toilet? No   Do you have difficulty moving around from place to place? No   Do you have trouble with steps or getting out of a bed or a chair? No   Current Diet Well Balanced Diet   Dental Exam Up to date   Eye Exam Up to date   Exercise (times per week) 7 times per week   Current Exercise Activities Include Aerobics   Do you need help using the phone?  No   Are you deaf or do you have serious difficulty hearing?  No   Do you need help with transportation? No   Do you need help shopping? No   Do you need help preparing meals?  No   Do you need help with housework?  No   Do you need help with laundry? No   Do you need help taking your medications? No   Do you need help managing money? No   Do you ever drive or ride in a car without wearing a seat belt? No   Have you felt unusual stress, anger or loneliness in the last month? No   Who do you live with? Spouse   If you need help, do you have trouble finding someone available to you? No   Have you been bothered in the last four weeks by sexual problems? No   Do you have difficulty concentrating, remembering or making decisions? No  "      Compared to one year ago, the patient feels his physical health is the same and his mental health is the same.    Depression Screen:  PHQ-2/PHQ-9 Depression Screening 7/16/2019   Little interest or pleasure in doing things 0   Feeling down, depressed, or hopeless 0   Trouble falling or staying asleep, or sleeping too much 0   Feeling tired or having little energy 0   Poor appetite or overeating 0   Feeling bad about yourself - or that you are a failure or have let yourself or your family down 0   Trouble concentrating on things, such as reading the newspaper or watching television 0   Moving or speaking so slowly that other people could have noticed. Or the opposite - being so fidgety or restless that you have been moving around a lot more than usual 0   Thoughts that you would be better off dead, or of hurting yourself in some way 0   Total Score 0   If you checked off any problems, how difficult have these problems made it for you to do your work, take care of things at home, or get along with other people? Not difficult at all         Past Medical/Family/Social History:  The following portions of the patient's history were reviewed and updated as appropriate: allergies, current medications, past family history, past medical history, past social history, past surgical history and problem list.    Allergies   Allergen Reactions   • Codeine    • Invokana [Canagliflozin]      Caused floaters in both eyes    • Metformin And Related GI Intolerance     GI   • Penicillins    • Sulfa Antibiotics    • Tramadol          Current Outpatient Medications:   •  Ascorbic Acid (VITAMIN C) 500 MG capsule, Take  by mouth Daily., Disp: , Rfl:   •  FISH OIL-COENZYME Q10 PO, Take  by mouth., Disp: , Rfl:   •  fluticasone (FLONASE) 50 MCG/ACT nasal spray, 2 sprays into each nostril Daily., Disp: 5 bottle, Rfl: 5  •  JARDIANCE 25 MG tablet, Take 25 mg by mouth Every Morning., Disp: 90 tablet, Rfl: 3  •  Needle, Disp, 22G X 1\" misc, " "1 inj weekly, Disp: 12 each, Rfl: 4  •  OZEMPIC 1 MG/DOSE solution pen-injector, Inject 1 mg under the skin into the appropriate area as directed 1 (One) Time Per Week., Disp: 2 pen, Rfl: 5  •  Syringe 18G X 1-1/2\" 3 ML misc, 1 inj every week, Disp: 12 each, Rfl: 4  •  tadalafil (CIALIS) 5 MG tablet, Take 1 daily for BPH (Patient taking differently: Take 5 mg by mouth. Take 1 daily for BPH), Disp: 30 tablet, Rfl: 5  •  Testosterone (ANDROGEL PUMP) 20.25 MG/ACT (1.62%) gel, 2 pumps to each shoulder/upper chest daily, Disp: 162 g, Rfl: 0  •  vitamin B-12 (CYANOCOBALAMIN) 1000 MCG tablet, Take 1,000 mcg by mouth Daily., Disp: , Rfl:   •  vitamin D (ERGOCALCIFEROL) 87163 units capsule capsule, 1 capsule once a week (Patient taking differently: 50,000 Units. 1 capsule once a month), Disp: 13 capsule, Rfl: 3  •  aspirin 81 MG tablet, Take 81 mg by mouth daily., Disp: , Rfl:   •  fluticasone (VERAMYST) 27.5 MCG/SPRAY nasal spray, 2 sprays into each nostril Daily., Disp: , Rfl:   •  guaiFENesin (MUCINEX) 600 MG 12 hr tablet, Take 1,200 mg by mouth 2 (Two) Times a Day., Disp: , Rfl:   •  predniSONE (DELTASONE) 20 MG tablet, Take 1 tablet by mouth 2 (Two) Times a Day., Disp: 10 tablet, Rfl: 0    Aspirin use counseling: Does not need ASA (and currently is not on it)    Current medication list contains no high risk medications.  No harmful drug interactions have been identified.     Family History   Problem Relation Age of Onset   • Myelodysplastic syndrome Mother    • Other Father         Cardiovascular disorder   • Diabetes Other    • Hyperlipidemia Other    • Hypertension Other        Social History     Tobacco Use   • Smoking status: Never Smoker   • Smokeless tobacco: Never Used   Substance Use Topics   • Alcohol use: No       Past Surgical History:   Procedure Laterality Date   • ADENOIDECTOMY     • APPENDECTOMY     • BACK SURGERY     • LASIK Bilateral    • TONSILLECTOMY         Patient Active Problem List   Diagnosis   • " "Acute bronchitis   • Acute pharyngitis   • Acute upper respiratory infection   • Allergic rhinitis   • Carpal tunnel syndrome   • Cough   • Non-insulin dependent type 2 diabetes mellitus (CMS/HCC)   • Alimentary obesity   • Fatigue   • Effusion of knee   • ED (erectile dysfunction) of organic origin   • Muscle ache   • Thumb pain   • Welcome to Medicare preventive visit   • Mixed hyperlipidemia   • Vitamin D deficiency   • Refusal of statin medication by patient   • Medication management   • Male hypogonadism   • Low testosterone   • Benign prostatic hyperplasia without lower urinary tract symptoms       Review of Systems    Objective     Vitals:    07/16/19 0947   BP: 102/64   BP Location: Left arm   Patient Position: Sitting   Cuff Size: Adult   Pulse: 79   Resp: 16   Temp: 97.7 °F (36.5 °C)   TempSrc: Oral   SpO2: 99%   Weight: 74.4 kg (164 lb)   Height: 162.6 cm (64.02\")   PainSc: 0-No pain       Patient's Body mass index is 28.14 kg/m². BMI is within normal parameters. No follow-up required..      No exam data present    The patient has no evidence of cognitve impairment.     Physical Exam    Recent Lab Results:  Lab Results   Component Value Date     (H) 06/13/2019     Lab Results   Component Value Date    TRIG 116 06/13/2019    HDL 43 06/13/2019    VLDL 23.2 06/13/2019    LDLHDL 2.51 12/04/2017         Assessment/Plan   Age-appropriate Screening Schedule:  Refer to the list below for future screening recommendations based on patient's age, sex and/or medical conditions.      Health Maintenance   Topic Date Due   • TDAP/TD VACCINES (1 - Tdap) 05/31/1970   • ZOSTER VACCINE (2 of 3) 10/18/2016   • DIABETIC FOOT EXAM  04/23/2018   • INFLUENZA VACCINE  08/01/2019   • DIABETIC EYE EXAM  09/18/2019   • HEMOGLOBIN A1C  12/13/2019   • LIPID PANEL  06/13/2020   • URINE MICROALBUMIN  06/13/2020   • COLONOSCOPY  02/15/2021   • PNEUMOCOCCAL VACCINES (65+ LOW/MEDIUM RISK)  Addressed       Medicare Risks and " Personalized Health Plan:  NA      CMS-Preventive Services Quick Reference  Medicare Preventive Services Addressed:  NA    Advance Care Planning:  Patient does not have an advance directive - not interested in additional information    Diagnoses and all orders for this visit:    1. Mixed hyperlipidemia (Primary)    2. Non-insulin dependent type 2 diabetes mellitus (CMS/[facility])    3. Benign prostatic hyperplasia without lower urinary tract symptoms        An After Visit Summary and PPPS with all of these plans were given to the patient.      Follow Up:  No Follow-up on file.

## 2019-07-16 NOTE — PROGRESS NOTES
Subjective   Brian Garcia is a 69 y.o. male.     History of Present Illness   Patient was seen for Medicare wellness exam.  Patient was seen for diabetes.  Patient is seeing an endocrine specialist and hemoglobin A1c is running in the 6%'s.  He does have benign prostatic hypertrophy she sees a urologist for evaluations.  His lipids controlled with diet exercise.    Dictated utilizing Dragon dictation. If there are questions or for further clarification, please contact me.  The following portions of the patient's history were reviewed and updated as appropriate: allergies, current medications, past family history, past medical history, past social history, past surgical history and problem list.    Review of Systems   Constitutional: Negative for fatigue and fever.   HENT: Positive for congestion. Negative for trouble swallowing.    Eyes: Negative for discharge and visual disturbance.   Respiratory: Negative for choking and shortness of breath.    Cardiovascular: Negative for chest pain and palpitations.   Gastrointestinal: Negative for abdominal pain and blood in stool.   Endocrine: Negative.    Genitourinary: Negative for genital sores and hematuria.   Musculoskeletal: Negative for gait problem and joint swelling.   Skin: Negative for color change, pallor, rash and wound.   Allergic/Immunologic: Positive for environmental allergies. Negative for immunocompromised state.   Neurological: Negative for facial asymmetry and speech difficulty.   Psychiatric/Behavioral: Negative for hallucinations and suicidal ideas.       Objective   Physical Exam   Constitutional: He is oriented to person, place, and time. He appears well-developed and well-nourished.   HENT:   Head: Normocephalic.   Eyes: Conjunctivae are normal. Pupils are equal, round, and reactive to light.   Neck: Normal range of motion. Neck supple.   Cardiovascular: Normal rate, regular rhythm and normal heart sounds.   Pulmonary/Chest: Effort normal and breath  sounds normal.   Abdominal: Soft. Bowel sounds are normal.   Musculoskeletal: Normal range of motion.   Neurological: He is alert and oriented to person, place, and time.   Skin: Skin is warm and dry.   Psychiatric: He has a normal mood and affect. His behavior is normal. Judgment and thought content normal.   Nursing note and vitals reviewed.      Assessment/Plan   Problems Addressed this Visit        Cardiovascular and Mediastinum    Mixed hyperlipidemia       Endocrine    Non-insulin dependent type 2 diabetes mellitus (CMS/HCC)       Genitourinary    Benign prostatic hyperplasia without lower urinary tract symptoms      Other Visit Diagnoses     Medicare annual wellness visit, initial    -  Primary

## 2019-07-16 NOTE — PATIENT INSTRUCTIONS
Medicare Wellness  Personal Prevention Plan of Service     Date of Office Visit:  2019  Encounter Provider:  Andrey Wolfe MD  Place of Service:  [facility]  Patient Name: Brian Garcia  :  1950    As part of the Medicare Wellness portion of your visit today, we are providing you with this personalized preventive plan of services (PPPS). This plan is based upon recommendations of the [place] Preventive Services Task Force (USPSTF) and the Advisory Committee on Immunization Practices (ACIP).    This lists the preventive care services that should be considered, and provides dates of when you are due. Items listed as completed are up-to-date and do not require any further intervention.    Health Maintenance   Topic Date Due   • TDAP/TD VACCINES (1 - Tdap) 1970   • ZOSTER VACCINE (2 of 3) 10/18/2016   • MEDICARE ANNUAL WELLNESS  2018   • DIABETIC FOOT EXAM  2018   • INFLUENZA VACCINE  2019   • DIABETIC EYE EXAM  2019   • HEMOGLOBIN A1C  2019   • LIPID PANEL  2020   • URINE MICROALBUMIN  2020   • COLONOSCOPY  02/15/2021   • HEPATITIS C SCREENING  Addressed   • PNEUMOCOCCAL VACCINES (65+ LOW/MEDIUM RISK)  Addressed       No orders of the defined types were placed in this encounter.      No Follow-up on file.

## 2019-07-20 DIAGNOSIS — E29.1 MALE HYPOGONADISM: ICD-10-CM

## 2019-07-24 RX ORDER — TESTOSTERONE 16.2 MG/G
GEL TRANSDERMAL
Qty: 162 G | Refills: 0 | Status: SHIPPED | OUTPATIENT
Start: 2019-07-24 | End: 2019-08-29 | Stop reason: SDUPTHER

## 2019-08-08 ENCOUNTER — RESULTS ENCOUNTER (OUTPATIENT)
Dept: ENDOCRINOLOGY | Age: 69
End: 2019-08-08

## 2019-08-08 DIAGNOSIS — E29.1 MALE HYPOGONADISM: ICD-10-CM

## 2019-08-12 ENCOUNTER — LAB (OUTPATIENT)
Dept: ENDOCRINOLOGY | Age: 69
End: 2019-08-12

## 2019-08-12 DIAGNOSIS — E55.9 VITAMIN D DEFICIENCY: ICD-10-CM

## 2019-08-12 DIAGNOSIS — E29.1 MALE HYPOGONADISM: ICD-10-CM

## 2019-08-12 DIAGNOSIS — E78.2 MIXED HYPERLIPIDEMIA: ICD-10-CM

## 2019-08-12 DIAGNOSIS — Z79.899 MEDICATION MANAGEMENT: ICD-10-CM

## 2019-08-12 DIAGNOSIS — IMO0002 UNCONTROLLED TYPE 2 DIABETES MELLITUS WITH COMPLICATION, WITHOUT LONG-TERM CURRENT USE OF INSULIN: ICD-10-CM

## 2019-08-15 LAB
25(OH)D3+25(OH)D2 SERPL-MCNC: 82.7 NG/ML (ref 30–100)
ALBUMIN SERPL-MCNC: 5.2 G/DL (ref 3.5–5.2)
ALBUMIN/CREAT UR: 4.2 MG/G CREAT (ref 0–30)
ALBUMIN/GLOB SERPL: 2.3 G/DL
ALP SERPL-CCNC: 29 U/L (ref 39–117)
ALT SERPL-CCNC: 27 U/L (ref 1–41)
AST SERPL-CCNC: 24 U/L (ref 1–40)
BILIRUB SERPL-MCNC: 0.4 MG/DL (ref 0.2–1.2)
BUN SERPL-MCNC: 17 MG/DL (ref 8–23)
BUN/CREAT SERPL: 14.5 (ref 7–25)
C PEPTIDE SERPL-MCNC: 2 NG/ML (ref 1.1–4.4)
CALCIUM SERPL-MCNC: 9.9 MG/DL (ref 8.6–10.5)
CHLORIDE SERPL-SCNC: 102 MMOL/L (ref 98–107)
CHOLEST SERPL-MCNC: 164 MG/DL (ref 0–200)
CO2 SERPL-SCNC: 19 MMOL/L (ref 22–29)
CREAT SERPL-MCNC: 1.17 MG/DL (ref 0.76–1.27)
CREAT UR-MCNC: 99.6 MG/DL
GLOBULIN SER CALC-MCNC: 2.3 GM/DL
GLUCOSE SERPL-MCNC: 120 MG/DL (ref 65–99)
HBA1C MFR BLD: 6.6 % (ref 4.8–5.6)
HDLC SERPL-MCNC: 43 MG/DL (ref 40–60)
INSULIN SERPL-ACNC: 2.5 UIU/ML (ref 2.6–24.9)
INTERPRETATION: NORMAL
LDLC SERPL CALC-MCNC: 101 MG/DL (ref 0–100)
Lab: NORMAL
MICROALBUMIN UR-MCNC: 4.2 UG/ML
POTASSIUM SERPL-SCNC: 4.7 MMOL/L (ref 3.5–5.2)
PROT SERPL-MCNC: 7.5 G/DL (ref 6–8.5)
SODIUM SERPL-SCNC: 146 MMOL/L (ref 136–145)
T3FREE SERPL-MCNC: 3.4 PG/ML (ref 2–4.4)
T4 FREE SERPL-MCNC: 1.38 NG/DL (ref 0.93–1.7)
THYROGLOB AB SERPL-ACNC: <1 IU/ML (ref 0–0.9)
THYROPEROXIDASE AB SERPL-ACNC: 17 IU/ML (ref 0–34)
TRIGL SERPL-MCNC: 98 MG/DL (ref 0–150)
TSH SERPL DL<=0.005 MIU/L-ACNC: 1.67 MIU/ML (ref 0.27–4.2)
URATE SERPL-MCNC: 4.8 MG/DL (ref 3.4–7)
VLDLC SERPL CALC-MCNC: 19.6 MG/DL (ref 5–40)

## 2019-08-29 DIAGNOSIS — E29.1 MALE HYPOGONADISM: ICD-10-CM

## 2019-08-30 RX ORDER — TESTOSTERONE 16.2 MG/G
GEL TRANSDERMAL
Qty: 162 G | Refills: 0 | Status: SHIPPED | OUTPATIENT
Start: 2019-08-30 | End: 2019-10-03 | Stop reason: SDUPTHER

## 2019-09-03 RX ORDER — SEMAGLUTIDE 1.34 MG/ML
1 INJECTION, SOLUTION SUBCUTANEOUS WEEKLY
Qty: 3 ML | Refills: 2 | Status: SHIPPED | OUTPATIENT
Start: 2019-09-03 | End: 2019-10-08 | Stop reason: SDUPTHER

## 2019-09-17 ENCOUNTER — LAB (OUTPATIENT)
Dept: ENDOCRINOLOGY | Age: 69
End: 2019-09-17

## 2019-09-17 DIAGNOSIS — E29.1 MALE HYPOGONADISM: ICD-10-CM

## 2019-09-17 DIAGNOSIS — E11.9 NON-INSULIN DEPENDENT TYPE 2 DIABETES MELLITUS (HCC): ICD-10-CM

## 2019-09-17 DIAGNOSIS — E78.2 MIXED HYPERLIPIDEMIA: Primary | ICD-10-CM

## 2019-09-17 DIAGNOSIS — E55.9 VITAMIN D DEFICIENCY: ICD-10-CM

## 2019-09-17 DIAGNOSIS — E78.2 MIXED HYPERLIPIDEMIA: ICD-10-CM

## 2019-09-18 LAB
25(OH)D3+25(OH)D2 SERPL-MCNC: 55.3 NG/ML (ref 30–100)
ALBUMIN SERPL-MCNC: 4.6 G/DL (ref 3.5–5.2)
ALBUMIN/GLOB SERPL: 1.8 G/DL
ALP SERPL-CCNC: 26 U/L (ref 39–117)
ALT SERPL-CCNC: 25 U/L (ref 1–41)
AST SERPL-CCNC: 20 U/L (ref 1–40)
BASOPHILS # BLD AUTO: 0.06 10*3/MM3 (ref 0–0.2)
BASOPHILS NFR BLD AUTO: 0.9 % (ref 0–1.5)
BILIRUB SERPL-MCNC: 0.4 MG/DL (ref 0.2–1.2)
BUN SERPL-MCNC: 15 MG/DL (ref 8–23)
BUN/CREAT SERPL: 13.2 (ref 7–25)
C PEPTIDE SERPL-MCNC: 2.9 NG/ML (ref 1.1–4.4)
CALCIUM SERPL-MCNC: 9.5 MG/DL (ref 8.6–10.5)
CHLORIDE SERPL-SCNC: 98 MMOL/L (ref 98–107)
CHOLEST SERPL-MCNC: 156 MG/DL (ref 0–200)
CO2 SERPL-SCNC: 28.4 MMOL/L (ref 22–29)
CREAT SERPL-MCNC: 1.14 MG/DL (ref 0.76–1.27)
EOSINOPHIL # BLD AUTO: 0.11 10*3/MM3 (ref 0–0.4)
EOSINOPHIL NFR BLD AUTO: 1.7 % (ref 0.3–6.2)
ERYTHROCYTE [DISTWIDTH] IN BLOOD BY AUTOMATED COUNT: 13.9 % (ref 12.3–15.4)
GLOBULIN SER CALC-MCNC: 2.5 GM/DL
GLUCOSE SERPL-MCNC: 113 MG/DL (ref 65–99)
HBA1C MFR BLD: 6.7 % (ref 4.8–5.6)
HCT VFR BLD AUTO: 54.4 % (ref 37.5–51)
HDLC SERPL-MCNC: 43 MG/DL (ref 40–60)
HGB BLD-MCNC: 16.9 G/DL (ref 13–17.7)
IMM GRANULOCYTES # BLD AUTO: 0.02 10*3/MM3 (ref 0–0.05)
IMM GRANULOCYTES NFR BLD AUTO: 0.3 % (ref 0–0.5)
INTERPRETATION: NORMAL
LDLC SERPL CALC-MCNC: 80 MG/DL (ref 0–100)
LYMPHOCYTES # BLD AUTO: 2.38 10*3/MM3 (ref 0.7–3.1)
LYMPHOCYTES NFR BLD AUTO: 37.1 % (ref 19.6–45.3)
Lab: NORMAL
MCH RBC QN AUTO: 27.3 PG (ref 26.6–33)
MCHC RBC AUTO-ENTMCNC: 31.1 G/DL (ref 31.5–35.7)
MCV RBC AUTO: 88 FL (ref 79–97)
MICROALBUMIN UR-MCNC: <3 UG/ML
MONOCYTES # BLD AUTO: 0.57 10*3/MM3 (ref 0.1–0.9)
MONOCYTES NFR BLD AUTO: 8.9 % (ref 5–12)
NEUTROPHILS # BLD AUTO: 3.28 10*3/MM3 (ref 1.7–7)
NEUTROPHILS NFR BLD AUTO: 51.1 % (ref 42.7–76)
NRBC BLD AUTO-RTO: 0 /100 WBC (ref 0–0.2)
PLATELET # BLD AUTO: 237 10*3/MM3 (ref 140–450)
POTASSIUM SERPL-SCNC: 4.7 MMOL/L (ref 3.5–5.2)
PROT SERPL-MCNC: 7.1 G/DL (ref 6–8.5)
RBC # BLD AUTO: 6.18 10*6/MM3 (ref 4.14–5.8)
SODIUM SERPL-SCNC: 141 MMOL/L (ref 136–145)
TESTOST FREE SERPL-MCNC: 12.5 PG/ML (ref 6.6–18.1)
TESTOST SERPL-MCNC: 407 NG/DL (ref 264–916)
TRIGL SERPL-MCNC: 164 MG/DL (ref 0–150)
VLDLC SERPL CALC-MCNC: 32.8 MG/DL
WBC # BLD AUTO: 6.42 10*3/MM3 (ref 3.4–10.8)

## 2019-10-03 DIAGNOSIS — E29.1 MALE HYPOGONADISM: ICD-10-CM

## 2019-10-04 RX ORDER — TESTOSTERONE 16.2 MG/G
GEL TRANSDERMAL
Qty: 162 G | Refills: 1 | Status: SHIPPED | OUTPATIENT
Start: 2019-10-04 | End: 2019-10-08 | Stop reason: SDUPTHER

## 2019-10-08 ENCOUNTER — OFFICE VISIT (OUTPATIENT)
Dept: ENDOCRINOLOGY | Age: 69
End: 2019-10-08

## 2019-10-08 VITALS
SYSTOLIC BLOOD PRESSURE: 118 MMHG | HEIGHT: 64 IN | WEIGHT: 164 LBS | DIASTOLIC BLOOD PRESSURE: 64 MMHG | BODY MASS INDEX: 28 KG/M2

## 2019-10-08 DIAGNOSIS — E29.1 MALE HYPOGONADISM: ICD-10-CM

## 2019-10-08 DIAGNOSIS — E55.9 VITAMIN D DEFICIENCY: ICD-10-CM

## 2019-10-08 DIAGNOSIS — IMO0002 UNCONTROLLED TYPE 2 DIABETES MELLITUS WITH COMPLICATION, WITHOUT LONG-TERM CURRENT USE OF INSULIN: ICD-10-CM

## 2019-10-08 PROCEDURE — 99214 OFFICE O/P EST MOD 30 MIN: CPT | Performed by: NURSE PRACTITIONER

## 2019-10-08 RX ORDER — EMPAGLIFLOZIN 25 MG/1
1 TABLET, FILM COATED ORAL EVERY MORNING
Qty: 90 TABLET | Refills: 1 | Status: SHIPPED | OUTPATIENT
Start: 2019-10-08 | End: 2020-07-28

## 2019-10-08 RX ORDER — TESTOSTERONE 16.2 MG/G
GEL TRANSDERMAL
Qty: 162 G | Refills: 0 | Status: SHIPPED | OUTPATIENT
Start: 2019-10-08 | End: 2020-11-19 | Stop reason: SDUPTHER

## 2019-10-08 RX ORDER — SEMAGLUTIDE 1.34 MG/ML
1 INJECTION, SOLUTION SUBCUTANEOUS WEEKLY
Qty: 3 ML | Refills: 2 | Status: SHIPPED | OUTPATIENT
Start: 2019-10-08 | End: 2020-05-04

## 2019-10-08 RX ORDER — ERGOCALCIFEROL 1.25 MG/1
CAPSULE ORAL
Qty: 13 CAPSULE | Refills: 3 | Status: SHIPPED | OUTPATIENT
Start: 2019-10-08 | End: 2020-11-19 | Stop reason: SDUPTHER

## 2019-10-08 RX ORDER — TADALAFIL 5 MG/1
TABLET ORAL
Qty: 90 TABLET | Refills: 1 | Status: SHIPPED | OUTPATIENT
Start: 2019-10-08 | End: 2020-05-21

## 2019-10-08 NOTE — PROGRESS NOTES
"Brian Garcia  presents to the office  for the follow-up appointment for Type 2 diabetes mellitus.     The diabete's condition location is throughout the system with the  clinical course has fluctuated, the severity is Mild, and the modifying/allievating factors are oral medications.  Medications and  Dosages were  reviewed with Brian Garcia and suggested that compliance most of the time.    The patient reports associated symptoms of hyperglycemia have been none and associated symptoms of hypoglycemia have been none, with their  hypoglycemia threshold for symptoms is n/a mg/dl .     The patient is currently on oral medications .      Compliance with blood glucose monitoring: good.     Meal panning: The patient is using avoidance of concentrated sweets.    The patient is currently taking home blood tests - Blood glucose testin-1 times daily, that are:  fasting- 1st thing in morning before eating or drinking    Last instructions given were:  Mixed hyperlipidemia- chronic, stable no medication changes at this time. Refills prescribed. Future labs ordered for upcoming appointment and assessment.   Uncontrolled type 2 diabetes mellitus with complication, without long-term current use of insulin (CMS/[facility])- chronic, stable no medication changes at this time. Refills prescribed. Future labs ordered for upcoming appointment and assessment.   Vitamin D deficiency- chronic, stable no medication changes at this time. Refills prescribed. Future labs ordered for upcoming appointment and assessment.   Male hypogonadism- chronic, stable no medication changes at this time. Refills prescribed. Future labs ordered for upcoming appointment and assessment.    Labs - low normal, increased fatigue. Patient requested change from injections to \"gel\" changed to Androgel with labs in 6 weeks.     Home blood glucose testing daily: 0-1  FB to 125 mg/dl   Before meals: 110-120 mg/dl  After meals: 120-140 mg/dl    Last reported blood " "glucose readings are:  Home blood glucose testing daily: 0-1  FB to 130 mg/dl    Patterns reported per patient are none.         The following portions of the patient's history were reviewed and updated as appropriate: current medications, past family history, past medical history, past social history, past surgical history and problem list.      Current Outpatient Medications:   •  aspirin 81 MG tablet, Take 81 mg by mouth daily., Disp: , Rfl:   •  fluticasone (FLONASE) 50 MCG/ACT nasal spray, 2 sprays into each nostril Daily., Disp: 5 bottle, Rfl: 5  •  fluticasone (VERAMYST) 27.5 MCG/SPRAY nasal spray, 2 sprays into each nostril Daily., Disp: , Rfl:   •  guaiFENesin (MUCINEX) 600 MG 12 hr tablet, Take 1,200 mg by mouth 2 (Two) Times a Day., Disp: , Rfl:   •  JARDIANCE 25 MG tablet, Take 25 mg by mouth Every Morning., Disp: 90 tablet, Rfl: 1  •  Multiple Vitamins-Minerals (MULTIVITAL-M PO), Take  by mouth., Disp: , Rfl:   •  Needle, Disp, 22G X 1\" misc, 1 inj weekly, Disp: 12 each, Rfl: 4  •  OZEMPIC, 1 MG/DOSE, 2 MG/1.5ML solution pen-injector, Inject 1 mg under the skin into the appropriate area as directed 1 (One) Time Per Week., Disp: 3 mL, Rfl: 2  •  predniSONE (DELTASONE) 20 MG tablet, Take 1 tablet by mouth 2 (Two) Times a Day., Disp: 10 tablet, Rfl: 0  •  Syringe 18G X 1-1/2\" 3 ML misc, 1 inj every week, Disp: 12 each, Rfl: 4  •  tadalafil (CIALIS) 5 MG tablet, Take 1 daily, Disp: 90 tablet, Rfl: 1  •  Testosterone 20.25 MG/ACT (1.62%) gel, 2 pumps to each shoulder, upper chest daily, Disp: 162 g, Rfl: 0  •  vitamin D (ERGOCALCIFEROL) 45025 units capsule capsule, 1 capsule once a week, Disp: 13 capsule, Rfl: 3    Patient Active Problem List    Diagnosis   • Low testosterone [R79.89]   • Benign prostatic hyperplasia without lower urinary tract symptoms [N40.0]   • Medication management [Z79.899]   • Male hypogonadism [E29.1]   • Refusal of statin medication by patient [Z53.29]   • Mixed hyperlipidemia " "[E78.2]   • Vitamin D deficiency [E55.9]   • Welcome to Medicare preventive visit [Z00.00]   • Acute bronchitis [J20.9]   • Acute pharyngitis [J02.9]   • Acute upper respiratory infection [J06.9]   • Allergic rhinitis [J30.9]   • Carpal tunnel syndrome [G56.00]   • Cough [R05]   • Non-insulin dependent type 2 diabetes mellitus (CMS/HCC) [E11.9]   • Alimentary obesity [E66.9]   • Fatigue [R53.83]   • Effusion of knee [M25.469]   • 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      Objective:     Wt Readings from Last 3 Encounters:   10/08/19 74.4 kg (164 lb)   07/16/19 74.4 kg (164 lb)   06/27/19 73.8 kg (162 lb 9.6 oz)     Temp Readings from Last 3 Encounters:   07/16/19 97.7 °F (36.5 °C) (Oral)   11/27/18 97 °F (36.1 °C) (Oral)   09/14/18 96.9 °F (36.1 °C)     BP Readings from Last 3 Encounters:   10/08/19 118/64   07/16/19 102/64   06/27/19 118/68     Pulse Readings from Last 3 Encounters:   07/16/19 79   11/27/18 70   09/14/18 66        /64   Ht 162.6 cm (64.02\")   Wt 74.4 kg (164 lb)   BMI 28.14 kg/m²        Physical Exam   Constitutional: He is oriented to person, place, and time. He appears well-developed and well-nourished. No distress.   HENT:   Head: Normocephalic and atraumatic.   Eyes: EOM are normal. Pupils are equal, round, and reactive to light.   Neck: Normal range of motion. Neck supple. No thyromegaly present.   Cardiovascular: Normal rate, regular rhythm, normal heart sounds and intact distal pulses.   No murmur heard.  Pulmonary/Chest: Effort normal and breath sounds normal.   Abdominal: Soft. Bowel sounds are normal.   Musculoskeletal: Normal range of motion.   Neurological: He is alert and oriented to person, place, and time.   Skin: Skin is warm and dry. Capillary refill takes 2 to 3 seconds. He is not diaphoretic.   Psychiatric: He has " a normal mood and affect. His behavior is normal. Judgment and thought content normal.   Nursing note and vitals reviewed.          Lab Review  Results for orders placed or performed in visit on 09/17/19   Lipid Panel   Result Value Ref Range    Total Cholesterol 156 0 - 200 mg/dL    Triglycerides 164 (H) 0 - 150 mg/dL    HDL Cholesterol 43 40 - 60 mg/dL    VLDL Cholesterol 32.8 mg/dL    LDL Cholesterol  80 0 - 100 mg/dL   Testosterone, Free, Total   Result Value Ref Range    Testosterone, Total 407 264 - 916 ng/dL    Testosterone, Free 12.5 6.6 - 18.1 pg/mL   Vitamin D 25 Hydroxy   Result Value Ref Range    25 Hydroxy, Vitamin D 55.3 30.0 - 100.0 ng/ml   MicroAlbumin, Urine, Random - Urine, Clean Catch   Result Value Ref Range    Microalbumin, Urine <3.0 Not Estab. ug/mL   Hemoglobin A1c   Result Value Ref Range    Hemoglobin A1C 6.70 (H) 4.80 - 5.60 %   C-Peptide   Result Value Ref Range    C-Peptide 2.9 1.1 - 4.4 ng/mL   Comprehensive Metabolic Panel   Result Value Ref Range    Glucose 113 (H) 65 - 99 mg/dL    BUN 15 8 - 23 mg/dL    Creatinine 1.14 0.76 - 1.27 mg/dL    eGFR Non African Am 64 >60 mL/min/1.73    eGFR African Am 77 >60 mL/min/1.73    BUN/Creatinine Ratio 13.2 7.0 - 25.0    Sodium 141 136 - 145 mmol/L    Potassium 4.7 3.5 - 5.2 mmol/L    Chloride 98 98 - 107 mmol/L    Total CO2 28.4 22.0 - 29.0 mmol/L    Calcium 9.5 8.6 - 10.5 mg/dL    Total Protein 7.1 6.0 - 8.5 g/dL    Albumin 4.60 3.50 - 5.20 g/dL    Globulin 2.5 gm/dL    A/G Ratio 1.8 g/dL    Total Bilirubin 0.4 0.2 - 1.2 mg/dL    Alkaline Phosphatase 26 (L) 39 - 117 U/L    AST (SGOT) 20 1 - 40 U/L    ALT (SGPT) 25 1 - 41 U/L   Cardiovascular Risk Assessment   Result Value Ref Range    Interpretation Note    Diabetes Patient Education   Result Value Ref Range    PDF Image Not applicable    CBC & Differential   Result Value Ref Range    WBC 6.42 3.40 - 10.80 10*3/mm3    RBC 6.18 (H) 4.14 - 5.80 10*6/mm3    Hemoglobin 16.9 13.0 - 17.7 g/dL     Hematocrit 54.4 (H) 37.5 - 51.0 %    MCV 88.0 79.0 - 97.0 fL    MCH 27.3 26.6 - 33.0 pg    MCHC 31.1 (L) 31.5 - 35.7 g/dL    RDW 13.9 12.3 - 15.4 %    Platelets 237 140 - 450 10*3/mm3    Neutrophil Rel % 51.1 42.7 - 76.0 %    Lymphocyte Rel % 37.1 19.6 - 45.3 %    Monocyte Rel % 8.9 5.0 - 12.0 %    Eosinophil Rel % 1.7 0.3 - 6.2 %    Basophil Rel % 0.9 0.0 - 1.5 %    Neutrophils Absolute 3.28 1.70 - 7.00 10*3/mm3    Lymphocytes Absolute 2.38 0.70 - 3.10 10*3/mm3    Monocytes Absolute 0.57 0.10 - 0.90 10*3/mm3    Eosinophils Absolute 0.11 0.00 - 0.40 10*3/mm3    Basophils Absolute 0.06 0.00 - 0.20 10*3/mm3    Immature Granulocyte Rel % 0.3 0.0 - 0.5 %    Immature Grans Absolute 0.02 0.00 - 0.05 10*3/mm3    nRBC 0.0 0.0 - 0.2 /100 WBC           Assessment:   Brian was seen today for follow-up.    Diagnoses and all orders for this visit:    Male hypogonadism  -     tadalafil (CIALIS) 5 MG tablet; Take 1 daily  -     Testosterone 20.25 MG/ACT (1.62%) gel; 2 pumps to each shoulder, upper chest daily  -     Comprehensive Metabolic Panel; Future  -     Hemoglobin & Hematocrit, Blood; Future  -     Testosterone, Free, Total; Future  -     Testosterone; Future    Uncontrolled type 2 diabetes mellitus with complication, without long-term current use of insulin (CMS/[facility])  -     JARDIANCE 25 MG tablet; Take 25 mg by mouth Every Morning.  -     OZEMPIC, 1 MG/DOSE, 2 MG/1.5ML solution pen-injector; Inject 1 mg under the skin into the appropriate area as directed 1 (One) Time Per Week.  -     Comprehensive Metabolic Panel; Future  -     Hemoglobin A1c; Future  -     Lipid Panel; Future  -     Microalbumin / Creatinine Urine Ratio - Urine, Clean Catch; Future  -     Uric Acid; Future  -     Vitamin D 25 Hydroxy; Future  -     TSH; Future  -     Thyroid Antibodies; Future  -     T4, Free; Future  -     T3, Free; Future  -     Hemoglobin & Hematocrit, Blood; Future  -     Testosterone, Free, Total; Future  -     Testosterone;  Future    Vitamin D deficiency  -     vitamin D (ERGOCALCIFEROL) 41214 units capsule capsule; 1 capsule once a week  -     Comprehensive Metabolic Panel; Future  -     Vitamin D 25 Hydroxy; Future          Plan:   In summary/ Medication changes: I met with this patient is metabolically stable and doing well at this time.  Laboratory testing was reviewed dated 9.17.2019, discussed with questions and answers completed.  Discussed and formulate a treatment plan with patient, patient verbally stated understood all instructions.    1. Diagnoses and all orders for this visit:    Male hypogonadism- chronic, stable no medication changes at this time. Refills prescribed. Future labs ordered for upcoming appointment and assessment.         Uncontrolled type 2 diabetes mellitus with complication, without long-term current use of insulin (CMS/[facility])- chronic, stable no medication changes at this time. Refills prescribed. Future labs ordered for upcoming appointment and assessment.        Vitamin D deficiency- chronic, stable no medication changes at this time. Refills prescribed. Future labs ordered for upcoming appointment and assessment.        Instructions:  home blood tests -  Blood glucose testing: 3 times daily, that are:  fasting- 1st thing in morning before eating or drinking  before each meal and 1 or 2 hours after meal  bedtime  anytime you feel symptoms of hyperglycemia or hypoglycemia (high or low blood sugars)        Education:  interpretation of lab results, blood sugar goals, complications of diabetes mellitus, hypoglycemia prevention and treatment, exercise, illness management, self-monitoring of blood glucose skills, nutrition and carbohydrate counting        The total face to face time spent was  25 minutes  with additional education given: 14 minutes (greater than 50% of the total time) was spent with counseling and coordination of care on: SMBG with goals, Side effects profiles with medications, medication use  and purposes,     Return in about 3 months (around 1/8/2020), or if symptoms worsen or fail to improve, for Recheck. 3 months with Harriet-2 weeks prior for labs 6 months with Dr. Lindsey-2 weeks prior for labs        Dragon transcription disclaimer     Much of this encounter note is an electronic transcription/translation of spoken language to printed text. The electronic translation of spoken language may permit erroneous, or at times, nonsensical words or phrases to be inadvertently transcribed. Although I have reviewed the note for such errors, some may still exist.

## 2020-01-08 ENCOUNTER — RESULTS ENCOUNTER (OUTPATIENT)
Dept: ENDOCRINOLOGY | Age: 70
End: 2020-01-08

## 2020-01-08 DIAGNOSIS — E55.9 VITAMIN D DEFICIENCY: ICD-10-CM

## 2020-01-08 DIAGNOSIS — E29.1 MALE HYPOGONADISM: ICD-10-CM

## 2020-01-08 DIAGNOSIS — IMO0002 UNCONTROLLED TYPE 2 DIABETES MELLITUS WITH COMPLICATION, WITHOUT LONG-TERM CURRENT USE OF INSULIN: ICD-10-CM

## 2020-01-24 ENCOUNTER — LAB (OUTPATIENT)
Dept: ENDOCRINOLOGY | Age: 70
End: 2020-01-24

## 2020-01-24 DIAGNOSIS — E29.1 MALE HYPOGONADISM: ICD-10-CM

## 2020-01-24 DIAGNOSIS — R79.89 LOW TESTOSTERONE: ICD-10-CM

## 2020-01-24 DIAGNOSIS — E11.9 NON-INSULIN DEPENDENT TYPE 2 DIABETES MELLITUS (HCC): ICD-10-CM

## 2020-01-24 DIAGNOSIS — N52.9 ED (ERECTILE DYSFUNCTION) OF ORGANIC ORIGIN: ICD-10-CM

## 2020-01-24 DIAGNOSIS — E78.2 MIXED HYPERLIPIDEMIA: Primary | ICD-10-CM

## 2020-01-24 DIAGNOSIS — R53.82 CHRONIC FATIGUE: ICD-10-CM

## 2020-01-24 DIAGNOSIS — E78.2 MIXED HYPERLIPIDEMIA: ICD-10-CM

## 2020-01-24 DIAGNOSIS — E55.9 VITAMIN D DEFICIENCY: ICD-10-CM

## 2020-01-27 ENCOUNTER — APPOINTMENT (OUTPATIENT)
Dept: PREADMISSION TESTING | Facility: HOSPITAL | Age: 70
End: 2020-01-27

## 2020-01-27 VITALS — HEIGHT: 64 IN | WEIGHT: 167 LBS | BODY MASS INDEX: 28.51 KG/M2 | RESPIRATION RATE: 18 BRPM

## 2020-01-27 LAB
ANION GAP SERPL CALCULATED.3IONS-SCNC: 14.7 MMOL/L (ref 5–15)
BUN BLD-MCNC: 19 MG/DL (ref 8–23)
BUN/CREAT SERPL: 19.8 (ref 7–25)
CALCIUM SPEC-SCNC: 8.9 MG/DL (ref 8.6–10.5)
CHLORIDE SERPL-SCNC: 101 MMOL/L (ref 98–107)
CO2 SERPL-SCNC: 23.3 MMOL/L (ref 22–29)
CREAT BLD-MCNC: 0.96 MG/DL (ref 0.76–1.27)
DEPRECATED RDW RBC AUTO: 39.1 FL (ref 37–54)
ERYTHROCYTE [DISTWIDTH] IN BLOOD BY AUTOMATED COUNT: 13 % (ref 12.3–15.4)
GFR SERPL CREATININE-BSD FRML MDRD: 78 ML/MIN/1.73
GLUCOSE BLD-MCNC: 147 MG/DL (ref 65–99)
HCT VFR BLD AUTO: 48.7 % (ref 37.5–51)
HGB BLD-MCNC: 16.4 G/DL (ref 13–17.7)
MCH RBC QN AUTO: 28.4 PG (ref 26.6–33)
MCHC RBC AUTO-ENTMCNC: 33.7 G/DL (ref 31.5–35.7)
MCV RBC AUTO: 84.4 FL (ref 79–97)
PLATELET # BLD AUTO: 217 10*3/MM3 (ref 140–450)
PMV BLD AUTO: 9.5 FL (ref 6–12)
POTASSIUM BLD-SCNC: 4.5 MMOL/L (ref 3.5–5.2)
RBC # BLD AUTO: 5.77 10*6/MM3 (ref 4.14–5.8)
SODIUM BLD-SCNC: 139 MMOL/L (ref 136–145)
WBC NRBC COR # BLD: 6.42 10*3/MM3 (ref 3.4–10.8)

## 2020-01-27 PROCEDURE — 93010 ELECTROCARDIOGRAM REPORT: CPT | Performed by: INTERNAL MEDICINE

## 2020-01-27 PROCEDURE — 85027 COMPLETE CBC AUTOMATED: CPT | Performed by: UROLOGY

## 2020-01-27 PROCEDURE — 93005 ELECTROCARDIOGRAM TRACING: CPT

## 2020-01-27 PROCEDURE — 36415 COLL VENOUS BLD VENIPUNCTURE: CPT

## 2020-01-27 PROCEDURE — 80048 BASIC METABOLIC PNL TOTAL CA: CPT | Performed by: UROLOGY

## 2020-01-27 NOTE — DISCHARGE INSTRUCTIONS
Take the following medications the morning of surgery: NONE    ARRIVE   AM            General Instructions:  • Do not eat or drink anything after midnight the night before surgery.  • Infants may have breast milk up to four hours before surgery.  • Infants drinking formula may drink formula up to six hours before surgery.   • Patients who avoid smoking, chewing tobacco and alcohol for 4 weeks prior to surgery have a reduced risk of post-operative complications.  Quit smoking as many days before surgery as you can.  • Do not smoke, use chewing tobacco or drink alcohol the day of surgery.   • If applicable bring your C-PAP/ BI-PAP machine.  • Bring any papers given to you in the doctor’s office.  • Wear clean comfortable clothes.  • Do not wear contact lenses, false eyelashes or make-up.  Bring a case for your glasses.   • Bring crutches or walker if applicable.  • Remove all piercings.  Leave jewelry and any other valuables at home.  • Hair extensions with metal clips must be removed prior to surgery.  • The Pre-Admission Testing nurse will instruct you to bring medications if unable to obtain an accurate list in Pre-Admission Testing.            Preventing a Surgical Site Infection:  • For 2 to 3 days before surgery, avoid shaving with a razor because the razor can irritate skin and make it easier to develop an infection.    • Any areas of open skin can increase the risk of a post-operative wound infection by allowing bacteria to enter and travel throughout the body.  Notify your surgeon if you have any skin wounds / rashes even if it is not near the expected surgical site.  The area will need assessed to determine if surgery should be delayed until it is healed.  • The night prior to surgery sleep in a clean bed with clean clothing.  Do not allow pets to sleep with you.  • Shower on the morning of surgery using a fresh bar of anti-bacterial soap (such as Dial) and clean washcloth.  Dry with a clean towel and  dress in clean clothing.  • Ask your surgeon if you will be receiving antibiotics prior to surgery.  • Make sure you, your family, and all healthcare providers clean their hands with soap and water or an alcohol based hand  before caring for you or your wound.    Day of surgery:  Your arrival time is approximately two hours before your scheduled surgery time.  Upon arrival, a Pre-op nurse and Anesthesiologist will review your health history, obtain vital signs, and answer questions you may have.  The only belongings needed at this time will be your home medications and if applicable your C-PAP/BI-PAP machine.  If you are staying overnight your family can leave the rest of your belongings in the car and bring them to your room later.  A Pre-op nurse will start an IV and you may receive medication in preparation for surgery, including something to help you relax.  Your family will be able to see you in the Pre-op area.  Two visitors at a time will be allowed in the Pre-op room.  While you are in surgery your family should notify the waiting room  if they leave the waiting room area and provide a contact phone number.    Please be aware that surgery does come with discomfort.  We want to make every effort to control your discomfort so please discuss any uncontrolled symptoms with your nurse.   Your doctor will most likely have prescribed pain medications.      If you are going home after surgery you will receive individualized written care instructions before being discharged.  A responsible adult must drive you to and from the hospital on the day of your surgery and stay with you for 24 hours.    If you are staying overnight following surgery, you will be transported to your hospital room following the recovery period.  [facility] has all private rooms.    If you have any questions please call Pre-Admission Testing at [phone].  Deductibles and co-payments are collected on the  day of service. Please be prepared to pay the required co-pay, deductible or deposit on the day of service as defined by your plan.

## 2020-01-29 LAB
25(OH)D3+25(OH)D2 SERPL-MCNC: 59.4 NG/ML (ref 30–100)
ALBUMIN SERPL-MCNC: 4.5 G/DL (ref 3.5–5.2)
ALBUMIN/GLOB SERPL: 2.4 G/DL
ALP SERPL-CCNC: 27 U/L (ref 39–117)
ALT SERPL-CCNC: 28 U/L (ref 1–41)
AST SERPL-CCNC: 19 U/L (ref 1–40)
BILIRUB SERPL-MCNC: 0.3 MG/DL (ref 0.2–1.2)
BUN SERPL-MCNC: 20 MG/DL (ref 8–23)
BUN/CREAT SERPL: 18.7 (ref 7–25)
C PEPTIDE SERPL-MCNC: 3 NG/ML (ref 1.1–4.4)
CALCIUM SERPL-MCNC: 9.3 MG/DL (ref 8.6–10.5)
CHLORIDE SERPL-SCNC: 103 MMOL/L (ref 98–107)
CHOLEST SERPL-MCNC: 136 MG/DL (ref 0–200)
CO2 SERPL-SCNC: 23.7 MMOL/L (ref 22–29)
CREAT SERPL-MCNC: 1.07 MG/DL (ref 0.76–1.27)
GLOBULIN SER CALC-MCNC: 1.9 GM/DL
GLUCOSE SERPL-MCNC: 135 MG/DL (ref 65–99)
HBA1C MFR BLD: 7.1 % (ref 4.8–5.6)
HCT VFR BLD AUTO: 49.1 % (ref 37.5–51)
HDLC SERPL-MCNC: 39 MG/DL (ref 40–60)
HGB BLD-MCNC: 16.9 G/DL (ref 13–17.7)
INTERPRETATION: NORMAL
LDLC SERPL CALC-MCNC: 65 MG/DL (ref 0–100)
Lab: NORMAL
MICROALBUMIN UR-MCNC: 7.6 UG/ML
POTASSIUM SERPL-SCNC: 4.9 MMOL/L (ref 3.5–5.2)
PROT SERPL-MCNC: 6.4 G/DL (ref 6–8.5)
SHBG SERPL-SCNC: 32 NMOL/L (ref 19.3–76.4)
SODIUM SERPL-SCNC: 140 MMOL/L (ref 136–145)
T3FREE SERPL-MCNC: 3.3 PG/ML (ref 2–4.4)
T4 FREE SERPL-MCNC: 1.42 NG/DL (ref 0.93–1.7)
T4 SERPL-MCNC: 7.94 MCG/DL (ref 4.5–11.7)
TESTOST FREE SERPL-MCNC: 6.9 PG/ML (ref 6.6–18.1)
TESTOST SERPL-MCNC: 258 NG/DL (ref 264–916)
THYROGLOB AB SERPL-ACNC: <1 IU/ML
THYROGLOB SERPL-MCNC: 8.9 NG/ML
THYROGLOB SERPL-MCNC: NORMAL NG/ML
TRIGL SERPL-MCNC: 161 MG/DL (ref 0–150)
TSH SERPL DL<=0.005 MIU/L-ACNC: 0.99 UIU/ML (ref 0.27–4.2)
URATE SERPL-MCNC: 4.2 MG/DL (ref 3.4–7)
VLDLC SERPL CALC-MCNC: 32.2 MG/DL

## 2020-02-04 ENCOUNTER — HOSPITAL ENCOUNTER (OUTPATIENT)
Facility: HOSPITAL | Age: 70
Setting detail: HOSPITAL OUTPATIENT SURGERY
Discharge: HOME | End: 2020-02-04
Attending: UROLOGY | Admitting: UROLOGY

## 2020-02-04 ENCOUNTER — ANESTHESIA (OUTPATIENT)
Dept: PERIOP | Facility: HOSPITAL | Age: 70
End: 2020-02-04

## 2020-02-04 ENCOUNTER — ANESTHESIA EVENT (OUTPATIENT)
Dept: PERIOP | Facility: HOSPITAL | Age: 70
End: 2020-02-04

## 2020-02-04 VITALS
WEIGHT: 164.1 LBS | DIASTOLIC BLOOD PRESSURE: 74 MMHG | HEIGHT: 64 IN | OXYGEN SATURATION: 96 % | BODY MASS INDEX: 28.01 KG/M2 | SYSTOLIC BLOOD PRESSURE: 135 MMHG | HEART RATE: 98 BPM | TEMPERATURE: 98.9 F | RESPIRATION RATE: 18 BRPM

## 2020-02-04 DIAGNOSIS — N32.89 BLADDER MASS: ICD-10-CM

## 2020-02-04 LAB
GLUCOSE BLDC GLUCOMTR-MCNC: 136 MG/DL (ref 70–130)
GLUCOSE BLDC GLUCOMTR-MCNC: 142 MG/DL (ref 70–130)

## 2020-02-04 PROCEDURE — 25010000002 LEVOFLOXACIN PER 250 MG: Performed by: UROLOGY

## 2020-02-04 PROCEDURE — 25010000002 FENTANYL CITRATE (PF) 100 MCG/2ML SOLUTION: Performed by: NURSE ANESTHETIST, CERTIFIED REGISTERED

## 2020-02-04 PROCEDURE — 82962 GLUCOSE BLOOD TEST: CPT

## 2020-02-04 PROCEDURE — 25010000002 PROPOFOL 10 MG/ML EMULSION: Performed by: NURSE ANESTHETIST, CERTIFIED REGISTERED

## 2020-02-04 PROCEDURE — 88307 TISSUE EXAM BY PATHOLOGIST: CPT | Performed by: UROLOGY

## 2020-02-04 PROCEDURE — 25010000002 DEXAMETHASONE PER 1 MG: Performed by: NURSE ANESTHETIST, CERTIFIED REGISTERED

## 2020-02-04 PROCEDURE — 25010000002 ONDANSETRON PER 1 MG: Performed by: NURSE ANESTHETIST, CERTIFIED REGISTERED

## 2020-02-04 RX ORDER — ACETAMINOPHEN 325 MG/1
650 TABLET ORAL ONCE AS NEEDED
Status: DISCONTINUED | OUTPATIENT
Start: 2020-02-04 | End: 2020-02-04 | Stop reason: HOSPADM

## 2020-02-04 RX ORDER — MIDAZOLAM HYDROCHLORIDE 1 MG/ML
1 INJECTION INTRAMUSCULAR; INTRAVENOUS
Status: DISCONTINUED | OUTPATIENT
Start: 2020-02-04 | End: 2020-02-04 | Stop reason: HOSPADM

## 2020-02-04 RX ORDER — FLUMAZENIL 0.1 MG/ML
0.2 INJECTION INTRAVENOUS AS NEEDED
Status: DISCONTINUED | OUTPATIENT
Start: 2020-02-04 | End: 2020-02-04 | Stop reason: HOSPADM

## 2020-02-04 RX ORDER — SODIUM CHLORIDE, SODIUM LACTATE, POTASSIUM CHLORIDE, CALCIUM CHLORIDE 600; 310; 30; 20 MG/100ML; MG/100ML; MG/100ML; MG/100ML
INJECTION, SOLUTION INTRAVENOUS CONTINUOUS PRN
Status: DISCONTINUED | OUTPATIENT
Start: 2020-02-04 | End: 2020-02-04 | Stop reason: SURG

## 2020-02-04 RX ORDER — HYDRALAZINE HYDROCHLORIDE 20 MG/ML
5 INJECTION INTRAMUSCULAR; INTRAVENOUS
Status: DISCONTINUED | OUTPATIENT
Start: 2020-02-04 | End: 2020-02-04 | Stop reason: HOSPADM

## 2020-02-04 RX ORDER — LIDOCAINE HYDROCHLORIDE 20 MG/ML
INJECTION, SOLUTION INFILTRATION; PERINEURAL AS NEEDED
Status: DISCONTINUED | OUTPATIENT
Start: 2020-02-04 | End: 2020-02-04 | Stop reason: SURG

## 2020-02-04 RX ORDER — LABETALOL HYDROCHLORIDE 5 MG/ML
5 INJECTION, SOLUTION INTRAVENOUS
Status: DISCONTINUED | OUTPATIENT
Start: 2020-02-04 | End: 2020-02-04 | Stop reason: HOSPADM

## 2020-02-04 RX ORDER — HYDROCODONE BITARTRATE AND ACETAMINOPHEN 7.5; 325 MG/1; MG/1
1 TABLET ORAL ONCE AS NEEDED
Status: DISCONTINUED | OUTPATIENT
Start: 2020-02-04 | End: 2020-02-04 | Stop reason: HOSPADM

## 2020-02-04 RX ORDER — FENTANYL CITRATE 50 UG/ML
INJECTION, SOLUTION INTRAMUSCULAR; INTRAVENOUS AS NEEDED
Status: DISCONTINUED | OUTPATIENT
Start: 2020-02-04 | End: 2020-02-04 | Stop reason: SURG

## 2020-02-04 RX ORDER — HYDROMORPHONE HYDROCHLORIDE 1 MG/ML
0.5 INJECTION, SOLUTION INTRAMUSCULAR; INTRAVENOUS; SUBCUTANEOUS
Status: DISCONTINUED | OUTPATIENT
Start: 2020-02-04 | End: 2020-02-04 | Stop reason: HOSPADM

## 2020-02-04 RX ORDER — HYDROCODONE BITARTRATE AND ACETAMINOPHEN 5; 325 MG/1; MG/1
1 TABLET ORAL EVERY 6 HOURS PRN
Qty: 20 TABLET | Refills: 0 | Status: SHIPPED | OUTPATIENT
Start: 2020-02-04 | End: 2020-11-19

## 2020-02-04 RX ORDER — ONDANSETRON 2 MG/ML
INJECTION INTRAMUSCULAR; INTRAVENOUS AS NEEDED
Status: DISCONTINUED | OUTPATIENT
Start: 2020-02-04 | End: 2020-02-04 | Stop reason: SURG

## 2020-02-04 RX ORDER — LEVOFLOXACIN 5 MG/ML
500 INJECTION, SOLUTION INTRAVENOUS
Status: COMPLETED | OUTPATIENT
Start: 2020-02-04 | End: 2020-02-04

## 2020-02-04 RX ORDER — PROMETHAZINE HYDROCHLORIDE 25 MG/1
25 TABLET ORAL ONCE AS NEEDED
Status: DISCONTINUED | OUTPATIENT
Start: 2020-02-04 | End: 2020-02-04 | Stop reason: HOSPADM

## 2020-02-04 RX ORDER — DIPHENHYDRAMINE HCL 25 MG
25 CAPSULE ORAL
Status: DISCONTINUED | OUTPATIENT
Start: 2020-02-04 | End: 2020-02-04 | Stop reason: HOSPADM

## 2020-02-04 RX ORDER — EPHEDRINE SULFATE 50 MG/ML
5 INJECTION, SOLUTION INTRAVENOUS ONCE AS NEEDED
Status: DISCONTINUED | OUTPATIENT
Start: 2020-02-04 | End: 2020-02-04 | Stop reason: HOSPADM

## 2020-02-04 RX ORDER — ONDANSETRON 2 MG/ML
4 INJECTION INTRAMUSCULAR; INTRAVENOUS ONCE AS NEEDED
Status: DISCONTINUED | OUTPATIENT
Start: 2020-02-04 | End: 2020-02-04 | Stop reason: HOSPADM

## 2020-02-04 RX ORDER — DEXAMETHASONE SODIUM PHOSPHATE 10 MG/ML
INJECTION INTRAMUSCULAR; INTRAVENOUS AS NEEDED
Status: DISCONTINUED | OUTPATIENT
Start: 2020-02-04 | End: 2020-02-04 | Stop reason: SURG

## 2020-02-04 RX ORDER — SODIUM CHLORIDE 0.9 % (FLUSH) 0.9 %
10 SYRINGE (ML) INJECTION EVERY 12 HOURS SCHEDULED
Status: DISCONTINUED | OUTPATIENT
Start: 2020-02-04 | End: 2020-02-04 | Stop reason: HOSPADM

## 2020-02-04 RX ORDER — MAGNESIUM HYDROXIDE 1200 MG/15ML
LIQUID ORAL AS NEEDED
Status: DISCONTINUED | OUTPATIENT
Start: 2020-02-04 | End: 2020-02-04 | Stop reason: HOSPADM

## 2020-02-04 RX ORDER — MEPERIDINE HYDROCHLORIDE 25 MG/ML
12.5 INJECTION INTRAMUSCULAR; INTRAVENOUS; SUBCUTANEOUS
Status: DISCONTINUED | OUTPATIENT
Start: 2020-02-04 | End: 2020-02-04 | Stop reason: HOSPADM

## 2020-02-04 RX ORDER — MIDAZOLAM HYDROCHLORIDE 1 MG/ML
2 INJECTION INTRAMUSCULAR; INTRAVENOUS
Status: DISCONTINUED | OUTPATIENT
Start: 2020-02-04 | End: 2020-02-04 | Stop reason: HOSPADM

## 2020-02-04 RX ORDER — FENTANYL CITRATE 50 UG/ML
50 INJECTION, SOLUTION INTRAMUSCULAR; INTRAVENOUS
Status: DISCONTINUED | OUTPATIENT
Start: 2020-02-04 | End: 2020-02-04 | Stop reason: HOSPADM

## 2020-02-04 RX ORDER — DIPHENHYDRAMINE HYDROCHLORIDE 50 MG/ML
12.5 INJECTION INTRAMUSCULAR; INTRAVENOUS
Status: DISCONTINUED | OUTPATIENT
Start: 2020-02-04 | End: 2020-02-04 | Stop reason: HOSPADM

## 2020-02-04 RX ORDER — OXYCODONE AND ACETAMINOPHEN 7.5; 325 MG/1; MG/1
1 TABLET ORAL ONCE AS NEEDED
Status: DISCONTINUED | OUTPATIENT
Start: 2020-02-04 | End: 2020-02-04 | Stop reason: HOSPADM

## 2020-02-04 RX ORDER — SODIUM CHLORIDE, SODIUM LACTATE, POTASSIUM CHLORIDE, CALCIUM CHLORIDE 600; 310; 30; 20 MG/100ML; MG/100ML; MG/100ML; MG/100ML
9 INJECTION, SOLUTION INTRAVENOUS CONTINUOUS
Status: DISCONTINUED | OUTPATIENT
Start: 2020-02-04 | End: 2020-02-04 | Stop reason: HOSPADM

## 2020-02-04 RX ORDER — PROPOFOL 10 MG/ML
VIAL (ML) INTRAVENOUS AS NEEDED
Status: DISCONTINUED | OUTPATIENT
Start: 2020-02-04 | End: 2020-02-04 | Stop reason: SURG

## 2020-02-04 RX ORDER — CIPROFLOXACIN 500 MG/1
500 TABLET, FILM COATED ORAL 2 TIMES DAILY
Qty: 10 TABLET | Refills: 0 | Status: SHIPPED | OUTPATIENT
Start: 2020-02-04 | End: 2020-05-12

## 2020-02-04 RX ORDER — PROMETHAZINE HYDROCHLORIDE 25 MG/ML
6.25 INJECTION, SOLUTION INTRAMUSCULAR; INTRAVENOUS
Status: DISCONTINUED | OUTPATIENT
Start: 2020-02-04 | End: 2020-02-04 | Stop reason: HOSPADM

## 2020-02-04 RX ORDER — FAMOTIDINE 10 MG/ML
20 INJECTION, SOLUTION INTRAVENOUS ONCE
Status: COMPLETED | OUTPATIENT
Start: 2020-02-04 | End: 2020-02-04

## 2020-02-04 RX ORDER — PROMETHAZINE HYDROCHLORIDE 25 MG/1
25 SUPPOSITORY RECTAL ONCE AS NEEDED
Status: DISCONTINUED | OUTPATIENT
Start: 2020-02-04 | End: 2020-02-04 | Stop reason: HOSPADM

## 2020-02-04 RX ORDER — PROMETHAZINE HYDROCHLORIDE 25 MG/ML
12.5 INJECTION, SOLUTION INTRAMUSCULAR; INTRAVENOUS ONCE AS NEEDED
Status: DISCONTINUED | OUTPATIENT
Start: 2020-02-04 | End: 2020-02-04 | Stop reason: HOSPADM

## 2020-02-04 RX ORDER — SODIUM CHLORIDE 0.9 % (FLUSH) 0.9 %
10 SYRINGE (ML) INJECTION AS NEEDED
Status: DISCONTINUED | OUTPATIENT
Start: 2020-02-04 | End: 2020-02-04 | Stop reason: HOSPADM

## 2020-02-04 RX ORDER — NALOXONE HCL 0.4 MG/ML
0.2 VIAL (ML) INJECTION AS NEEDED
Status: DISCONTINUED | OUTPATIENT
Start: 2020-02-04 | End: 2020-02-04 | Stop reason: HOSPADM

## 2020-02-04 RX ADMIN — SODIUM CHLORIDE, POTASSIUM CHLORIDE, SODIUM LACTATE AND CALCIUM CHLORIDE 9 ML/HR: 600; 310; 30; 20 INJECTION, SOLUTION INTRAVENOUS at 08:25

## 2020-02-04 RX ADMIN — FAMOTIDINE 20 MG: 10 INJECTION INTRAVENOUS at 08:25

## 2020-02-04 RX ADMIN — FENTANYL CITRATE 50 MCG: 50 INJECTION INTRAMUSCULAR; INTRAVENOUS at 09:57

## 2020-02-04 RX ADMIN — ONDANSETRON HYDROCHLORIDE 4 MG: 2 SOLUTION INTRAMUSCULAR; INTRAVENOUS at 10:13

## 2020-02-04 RX ADMIN — PROPOFOL 150 MG: 10 INJECTION, EMULSION INTRAVENOUS at 09:57

## 2020-02-04 RX ADMIN — SODIUM CHLORIDE, POTASSIUM CHLORIDE, SODIUM LACTATE AND CALCIUM CHLORIDE: 600; 310; 30; 20 INJECTION, SOLUTION INTRAVENOUS at 09:29

## 2020-02-04 RX ADMIN — LIDOCAINE HYDROCHLORIDE 80 MG: 20 INJECTION, SOLUTION INFILTRATION; PERINEURAL at 09:57

## 2020-02-04 RX ADMIN — LEVOFLOXACIN 500 MG: 5 INJECTION, SOLUTION INTRAVENOUS at 09:32

## 2020-02-04 RX ADMIN — DEXAMETHASONE SODIUM PHOSPHATE 6 MG: 10 INJECTION INTRAMUSCULAR; INTRAVENOUS at 10:04

## 2020-02-04 NOTE — ANESTHESIA PROCEDURE NOTES
Airway  Urgency: elective    Date/Time: 2/4/2020 9:57 AM  Airway not difficult    General Information and Staff    Patient location during procedure: OR  Anesthesiologist: Jason Bah MD  CRNA: Scott Talley CRNA    Indications and Patient Condition  Indications for airway management: airway protection    Preoxygenated: yes  MILS not maintained throughout  Mask difficulty assessment: 1 - vent by mask    Final Airway Details  Final airway type: supraglottic airway      Successful airway: LMA and unique  Size 5    Number of attempts at approach: 1  Assessment: lips, teeth, and gum same as pre-op    Additional Comments  Pre O2, SIAI

## 2020-02-04 NOTE — OP NOTE
PREOPERATIVE DIAGNOSIS: Right wall bladder mass.    POSTOPERATIVE DIAGNOSIS: Same    PROCEDURE: Cystoscopy transurethral resection of bladder tumor    SURGEON:  Leandro Rodas MD    ASSISTANT: None    ANESTHESIA: General    EBL: None    DRAINS: 16 Panamanian Martinez catheter.    COMPLICATIONS: None    FINDINGS: 2.5 cm right wall superficial bladder mass    INDICATIONS FOR PROCEDURE: History of microscopic hematuria.  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  He presents today for cystoscopy TURBT.  Risk and benefits were explained include but not limited to bleeding, infection, damage to bladder and urethra.  Also possibility multiple procedures were discussed.  Patient consented to the procedure.    DESCRIPTION OF PROCEDURE: After receiving antibiotics he was taken to the cystoscopy suite underwent a general anesthesia.  After adequate anesthesia was obtained he is placed in dorsal lithotomy position.  His groin was then prepped and draped in a sterile fashion.  A 22.5 Panamanian cystoscope was introduced into the urethra and into the bladder.  Urethra was in normal limits.  Prostate was moderately obstructing.  Once into the bladder the ureteral orifice ease were noted bilaterally.  On the right wall the bladder the lesion was identified.  Cystoscope was then removed I then placed a 27 Panamanian sheath into the bladder and using cutting current resected the tumor completely taking deep muscle bites for staging.  Meticulous hemostasis was obtained with cauterization.  All the chips were irrigated free resectoscope was then removed.  I then placed a 16 Panamanian Martinez catheter 10 cc were placed in balloon Martinez bag was placed.  He was extubated taken recovery in satisfactory condition.  He tolerated procedure well.  His urine was pink when he left the OR.

## 2020-02-04 NOTE — ADDENDUM NOTE
Addendum  created 02/04/20 1154 by Jason Bah MD    Attestation recorded in Intraprocedure, Intraprocedure Attestations filed

## 2020-02-04 NOTE — ANESTHESIA POSTPROCEDURE EVALUATION
"Patient: Brian Garcia    Procedure Summary     Date:  02/04/20 Room / Location:  [facility] OR 01 / [facility] MAIN OR    Anesthesia Start:  0949 Anesthesia Stop:  1031    Procedure:  TRANSURETHRAL RESECTION OF BLADDER TUMOR (N/A ) Diagnosis:      Surgeon:  Leandro Rodas MD Provider:  Jason Bah MD    Anesthesia Type:  general ASA Status:  2          Anesthesia Type: general    Vitals  Vitals Value Taken Time   /73 2/4/2020 10:55 AM   Temp 37.2 °C (98.9 °F) 2/4/2020 10:29 AM   Pulse 96 2/4/2020 10:55 AM   Resp 20 2/4/2020 10:55 AM   SpO2 97 % 2/4/2020 10:56 AM           Post Anesthesia Care and Evaluation    Patient location during evaluation: bedside  Patient participation: complete - patient participated  Level of consciousness: awake and alert  Pain management: adequate  Airway patency: patent  Anesthetic complications: No anesthetic complications    Cardiovascular status: acceptable  Respiratory status: acceptable  Hydration status: acceptable    Comments: /75 (BP Location: Left arm, Patient Position: Lying)   Pulse 98   Temp 37.2 °C (98.9 °F) (Oral)   Resp 18   Ht 162.6 cm (64\")   Wt 74.4 kg (164 lb 1.6 oz)   SpO2 96%   BMI 28.17 kg/m²       "

## 2020-02-04 NOTE — ANESTHESIA PREPROCEDURE EVALUATION
Anesthesia Evaluation     no history of anesthetic complications:  NPO Solid Status: > 8 hours  NPO Liquid Status: > 8 hours           Airway   Mallampati: II  Dental      Pulmonary - normal exam   (-) recent URI  Cardiovascular - normal exam        Neuro/Psych  GI/Hepatic/Renal/Endo    (+)   diabetes mellitus,     Musculoskeletal     Abdominal    Substance History      OB/GYN          Other                        Anesthesia Plan    ASA 2     general     intravenous induction     Anesthetic plan, all risks, benefits, and alternatives have been provided, discussed and informed consent has been obtained with: patient.

## 2020-02-04 NOTE — H&P
"       FIRST UROLOGY CONSULT      Patient Identification:  NAME:  Brian Garcia  Age:  69 y.o.   Sex:  male   :  1950   MRN:  [id number]       Chief complaint: Bladder mass.      History of present illness:  H/o microhematuria. Cysto bladder mass R wall. For cysto TURBT.        Past medical history:  Past Medical History:   Diagnosis Date   • Bladder tumor    • Diabetes mellitus (CMS/HCC)    • History of asthma    • History of cardiac murmur as a child    • History of skin cancer    • Hyperlipidemia    • Type 2 diabetes mellitus (CMS/HCC)        Past surgical history:  Past Surgical History:   Procedure Laterality Date   • ADENOIDECTOMY     • APPENDECTOMY     • CATARACT EXTRACTION, BILATERAL     • CERVICAL SPINE SURGERY     • LASIK Bilateral    • TONSILLECTOMY         Allergies:  Codeine; Invokana [canagliflozin]; Metformin and related; Penicillins; Sulfa antibiotics; and Tramadol    Home medications:  Medications Prior to Admission   Medication Sig Dispense Refill Last Dose   • JARDIANCE 25 MG tablet Take 25 mg by mouth Every Morning. (Patient taking differently: Take 25 mg by mouth Every Morning.) 90 tablet 1 Taking   • Multiple Vitamins-Minerals (MULTIVITAL-M PO) Take 1 tablet by mouth Daily.   Not Taking   • Needle, Disp, 22G X 1\" misc 1 inj weekly 12 each 4 Taking   • OZEMPIC, 1 MG/DOSE, 2 MG/1.5ML solution pen-injector Inject 1 mg under the skin into the appropriate area as directed 1 (One) Time Per Week. 3 mL 2 Taking   • predniSONE (DELTASONE) 20 MG tablet Take 1 tablet by mouth 2 (Two) Times a Day. (Patient taking differently: Take 20 mg by mouth As Needed (BUG BITES).) 10 tablet 0 Not Taking   • Syringe 18G X 1-1/2\" 3 ML misc 1 inj every week 12 each 4 Taking   • tadalafil (CIALIS) 5 MG tablet Take 1 daily (Patient taking differently: Take 5 mg by mouth Daily. Take 1 daily) 90 tablet 1 Not Taking   • Testosterone 20.25 MG/ACT (1.62%) gel 2 pumps to each shoulder, upper chest daily (Patient taking " differently: Take As Directed. 2 pumps to each shoulder, upper chest daily  HELD FOR SURGERY) 162 g 0 Not Taking   • vitamin D (ERGOCALCIFEROL) 15836 units capsule capsule 1 capsule once a week (Patient taking differently: 50,000 Units Every 7 (Seven) Days. 1 capsule once a week) 13 capsule 3 Not Taking        Hospital medications:      No current facility-administered medications for this encounter.       Family history:  Family History   Problem Relation Age of Onset   • Myelodysplastic syndrome Mother    • Other Father         Cardiovascular disorder   • Diabetes Other    • Hyperlipidemia Other    • Hypertension Other    • Malig Hyperthermia Neg Hx        Social history:  Social History     Tobacco Use   • Smoking status: Never Smoker   • Smokeless tobacco: Never Used   Substance Use Topics   • Alcohol use: No   • Drug use: No       Review of systems:      Positive for:  Bladder mass.    Negative for:  Fever.      Objective:  TMax 24 hours:   No data recorded.      Vitals Ranges:        Intake/Output Last 3 shifts:  No intake/output data recorded.     Physical Exam:    General Appearance:    Alert, cooperative, NAD   HEENT:    No trauma, pupils reactive, hearing intact   Back:     No CVA tenderness   Lungs:     Respirations unlabored, no wheezing    Heart:    RRR.   Abdomen:     Soft, NDNT, no masses, no guarding   :     Extremities:   No edema, no deformity   Lymphatic:   No neck or groin LAD   Skin:   No bleeding, bruising or rashes   Neuro/Psych:   Orientation intact, mood/affect pleasant, no focal findings       Results review:   I reviewed the patient's new clinical results.    Data review:  Lab Results (last 24 hours)     ** No results found for the last 24 hours. **           Imaging:  Imaging Results (Last 24 Hours)     ** No results found for the last 24 hours. **             Assessment:       * No active hospital problems. *    Bladder mass.      Plan:     Cysto TURBT.  R/B d/w pt.      Leandro Carrasquillo  MD Clark  02/04/20  7:19 AM

## 2020-02-06 LAB
CYTO UR: NORMAL
LAB AP CASE REPORT: NORMAL
LAB AP SYNOPTIC CHECKLIST: NORMAL
PATH REPORT.FINAL DX SPEC: NORMAL
PATH REPORT.GROSS SPEC: NORMAL

## 2020-05-04 ENCOUNTER — TELEPHONE (OUTPATIENT)
Dept: ENDOCRINOLOGY | Age: 70
End: 2020-05-04

## 2020-05-04 RX ORDER — SITAGLIPTIN 100 MG/1
100 TABLET, FILM COATED ORAL DAILY
Qty: 30 TABLET | Refills: 11 | Status: SHIPPED | OUTPATIENT
Start: 2020-05-04 | End: 2020-11-19 | Stop reason: CLARIF

## 2020-05-04 NOTE — TELEPHONE ENCOUNTER
Spoke to patient.  He expressed understanding.        ----- Message from Jerzy Lindsey MD sent at 5/4/2020  3:16 PM EDT -----  This is a side effect of taking Ozempic and other members of its class.  Please a stop using Ozempic and I am starting you on Januvia 100 mg tablets to take once daily and continue Jardiance as you have.  Let see what your blood sugar does in response to these 2 medications  ----- Message -----  From: Mishel Neves MA  Sent: 5/4/2020   2:18 PM EDT  To: Jerzy Lindsey MD    Patient states that he has been taking Ozempic and Jardiance and his BG is running great but it was causing constipation. He states that his symptoms have gotten worse and now he is using Miralax. He did not take the shot last week and states that his symptoms have got better. He wants to know what he can do. He can no longer take the symptoms.     [phone] ok to leave a voicemail

## 2020-05-11 ENCOUNTER — TELEPHONE (OUTPATIENT)
Dept: FAMILY MEDICINE CLINIC | Facility: CLINIC | Age: 70
End: 2020-05-11

## 2020-05-11 NOTE — TELEPHONE ENCOUNTER
Patient calling and states he has a few insect bites on his scalp. States they are swollen and weeping. Would like something called in to help. Verified [facility] on [address]. States he has had MethylPREDNISolone (MEDROL, CORBY,) 4 MG tablet and doxycycline (ADOXA) 100 MG tablet.   Patient can be reached at [phone].

## 2020-05-12 RX ORDER — DOXYCYCLINE HYCLATE 100 MG
100 TABLET ORAL 2 TIMES DAILY
Qty: 20 TABLET | Refills: 0 | Status: SHIPPED | OUTPATIENT
Start: 2020-05-12 | End: 2020-05-21

## 2020-05-20 ENCOUNTER — TELEPHONE (OUTPATIENT)
Dept: FAMILY MEDICINE CLINIC | Facility: CLINIC | Age: 70
End: 2020-05-20

## 2020-05-20 NOTE — TELEPHONE ENCOUNTER
Patient calling and states he was prescribed abx for insect bites. States he broke out in hives so he stopped the abx. He started taking benadryl, but at night the rash gets worse. He states he has had the shingles vaccine and is worried he may not be having a reaction to the abx and that its something else. The rash is on stomach, sides, arms and legs. Would like advice on what to do. Made patient appointment for Friday at 10:45 wit Christine Hein, but would like advice before then.    Patient call back is [phone].

## 2020-05-20 NOTE — TELEPHONE ENCOUNTER
It is impossible to advise about the rash without seeing it.  Benadryl is a good choice but without seeing it cannot advise at this time

## 2020-05-21 ENCOUNTER — OFFICE VISIT (OUTPATIENT)
Dept: FAMILY MEDICINE CLINIC | Facility: CLINIC | Age: 70
End: 2020-05-21

## 2020-05-21 VITALS
BODY MASS INDEX: 27.83 KG/M2 | SYSTOLIC BLOOD PRESSURE: 112 MMHG | RESPIRATION RATE: 18 BRPM | OXYGEN SATURATION: 99 % | HEART RATE: 88 BPM | DIASTOLIC BLOOD PRESSURE: 60 MMHG | HEIGHT: 64 IN | TEMPERATURE: 98 F | WEIGHT: 163 LBS

## 2020-05-21 DIAGNOSIS — E78.2 MIXED HYPERLIPIDEMIA: ICD-10-CM

## 2020-05-21 DIAGNOSIS — N40.0 BENIGN PROSTATIC HYPERPLASIA WITHOUT LOWER URINARY TRACT SYMPTOMS: ICD-10-CM

## 2020-05-21 DIAGNOSIS — L50.9 HIVES: ICD-10-CM

## 2020-05-21 DIAGNOSIS — E11.9 NON-INSULIN DEPENDENT TYPE 2 DIABETES MELLITUS (HCC): Primary | ICD-10-CM

## 2020-05-21 PROCEDURE — 99214 OFFICE O/P EST MOD 30 MIN: CPT | Performed by: INTERNAL MEDICINE

## 2020-05-21 RX ORDER — ALFUZOSIN HYDROCHLORIDE 10 MG/1
TABLET, EXTENDED RELEASE ORAL
COMMUNITY
Start: 2020-03-11 | End: 2020-05-21

## 2020-05-21 RX ORDER — METHYLPREDNISOLONE 4 MG/1
TABLET ORAL
Qty: 1 EACH | Refills: 0 | Status: SHIPPED | OUTPATIENT
Start: 2020-05-21 | End: 2020-11-19

## 2020-05-21 NOTE — PROGRESS NOTES
Subjective   Brian Garcia is a 69 y.o. male.     History of Present Illness   Patient was seen for diabetes.  Patient's blood sugars been running in the 130s.  Patient was advised to continue present treatment diet and activity level.  His hemoglobin A1c on 1/24/2020 was 7.1%.  Triglycerides 161, HDL 39, LDL 65.  Patient does have BPH and has trouble urinating at night.  Patient does take Uroxatrol 10 mg nightly.  Patient states his believes his urinating problems.  Patient has extreme allergies and was taking doxycycline when he developed hives and severe itching.  Patient begin Benadryl 25 mg with relief of the itching.  Patient still retained the hives on his arms legs and torso.  Patient states he can take a round of Medrol Dosepak with Benadryl.  Patient does monitor his blood sugar and while on steroids the sugar does not get above 250.  Patient states this is the only thing that helps his hives.  Patient was advised to monitor his blood sugar while taking the Medrol Dosepak and call up sugars become higher than 250.  Patient will follow-up in 4 months.    Dictated utilizing Dragon dictation. If there are questions or for further clarification, please contact me.  The following portions of the patient's history were reviewed and updated as appropriate: allergies, current medications, past family history, past medical history, past social history, past surgical history and problem list.    Review of Systems   Constitutional: Negative for fatigue and fever.   HENT: Positive for congestion. Negative for trouble swallowing.    Eyes: Negative for discharge and visual disturbance.   Respiratory: Negative for choking and shortness of breath.    Cardiovascular: Negative for chest pain and palpitations.   Gastrointestinal: Negative for abdominal pain and blood in stool.   Endocrine: Negative.    Genitourinary: Positive for urgency. Negative for genital sores and hematuria.   Musculoskeletal: Negative for gait problem  and joint swelling.   Skin: Positive for rash. Negative for color change, pallor and wound.   Allergic/Immunologic: Positive for environmental allergies. Negative for immunocompromised state.   Neurological: Negative for facial asymmetry and speech difficulty.   Psychiatric/Behavioral: Negative for hallucinations and suicidal ideas.       Objective   Physical Exam   Constitutional: He is oriented to person, place, and time. He appears well-developed and well-nourished.   HENT:   Head: Normocephalic and atraumatic.   Eyes: Pupils are equal, round, and reactive to light. Conjunctivae and EOM are normal.   Neck: Normal range of motion. Neck supple.   Cardiovascular: Normal rate, regular rhythm and normal heart sounds. Exam reveals no gallop and no friction rub.   No murmur heard.  Pulmonary/Chest: Effort normal and breath sounds normal. No stridor. He has no wheezes. He has no rales. He exhibits no tenderness.   Abdominal: Soft. Bowel sounds are normal.   Musculoskeletal: Normal range of motion.   Neurological: He is alert and oriented to person, place, and time.   Skin: Skin is warm and dry. Rash noted.   Psychiatric: He has a normal mood and affect. His behavior is normal. Judgment and thought content normal.   Nursing note and vitals reviewed.      Assessment/Plan #1 monitor blood sugar while on the Dosepak #2 continue Benadryl (do not drive on Benadryl) #3 continue the alfuzocin  Brian was seen today for rash and bladder cancer 2/4.    Diagnoses and all orders for this visit:    Non-insulin dependent type 2 diabetes mellitus (CMS/HCC)    Benign prostatic hyperplasia without lower urinary tract symptoms    Mixed hyperlipidemia    Hives    Other orders  -     methylPREDNISolone (MEDROL, CORBY,) 4 MG tablet; Take as directed on package instructions.  -     mupirocin (Bactroban) 2 % ointment; Apply  topically to the appropriate area as directed 3 (Three) Times a Day.

## 2020-07-27 DIAGNOSIS — IMO0002 UNCONTROLLED TYPE 2 DIABETES MELLITUS WITH COMPLICATION, WITHOUT LONG-TERM CURRENT USE OF INSULIN: ICD-10-CM

## 2020-07-28 RX ORDER — EMPAGLIFLOZIN 25 MG/1
TABLET, FILM COATED ORAL
Qty: 90 TABLET | Refills: 1 | Status: ON HOLD | OUTPATIENT
Start: 2020-07-28 | End: 2021-02-21

## 2020-09-30 ENCOUNTER — LAB (OUTPATIENT)
Dept: ENDOCRINOLOGY | Age: 70
End: 2020-09-30

## 2020-09-30 DIAGNOSIS — E29.1 MALE HYPOGONADISM: ICD-10-CM

## 2020-09-30 DIAGNOSIS — E78.2 MIXED HYPERLIPIDEMIA: ICD-10-CM

## 2020-09-30 DIAGNOSIS — IMO0002 UNCONTROLLED TYPE 2 DIABETES MELLITUS WITH COMPLICATION, WITHOUT LONG-TERM CURRENT USE OF INSULIN: Primary | ICD-10-CM

## 2020-09-30 DIAGNOSIS — E55.9 VITAMIN D DEFICIENCY: ICD-10-CM

## 2020-09-30 DIAGNOSIS — IMO0002 UNCONTROLLED TYPE 2 DIABETES MELLITUS WITH COMPLICATION, WITHOUT LONG-TERM CURRENT USE OF INSULIN: ICD-10-CM

## 2020-10-02 LAB
25(OH)D3+25(OH)D2 SERPL-MCNC: 61.3 NG/ML (ref 30–100)
ALBUMIN SERPL-MCNC: 4.3 G/DL (ref 3.5–5.2)
ALBUMIN/CREAT UR: 31 MG/G CREAT (ref 0–29)
ALBUMIN/GLOB SERPL: 2.2 G/DL
ALP SERPL-CCNC: 24 U/L (ref 39–117)
ALT SERPL-CCNC: 38 U/L (ref 1–41)
AST SERPL-CCNC: 25 U/L (ref 1–40)
BASOPHILS # BLD AUTO: 0.07 10*3/MM3 (ref 0–0.2)
BASOPHILS NFR BLD AUTO: 1.2 % (ref 0–1.5)
BILIRUB SERPL-MCNC: 0.4 MG/DL (ref 0–1.2)
BUN SERPL-MCNC: 18 MG/DL (ref 8–23)
BUN/CREAT SERPL: 16.4 (ref 7–25)
CALCIUM SERPL-MCNC: 9 MG/DL (ref 8.6–10.5)
CHLORIDE SERPL-SCNC: 102 MMOL/L (ref 98–107)
CHOLEST SERPL-MCNC: 183 MG/DL (ref 0–200)
CO2 SERPL-SCNC: 25 MMOL/L (ref 22–29)
CREAT SERPL-MCNC: 1.1 MG/DL (ref 0.76–1.27)
CREAT UR-MCNC: 55.6 MG/DL
EOSINOPHIL # BLD AUTO: 0.09 10*3/MM3 (ref 0–0.4)
EOSINOPHIL NFR BLD AUTO: 1.5 % (ref 0.3–6.2)
ERYTHROCYTE [DISTWIDTH] IN BLOOD BY AUTOMATED COUNT: 13.2 % (ref 12.3–15.4)
GLOBULIN SER CALC-MCNC: 2 GM/DL
GLUCOSE SERPL-MCNC: 157 MG/DL (ref 65–99)
HBA1C MFR BLD: 9.2 % (ref 4.8–5.6)
HCT VFR BLD AUTO: 42.8 % (ref 37.5–51)
HDLC SERPL-MCNC: 38 MG/DL (ref 40–60)
HGB BLD-MCNC: 14.6 G/DL (ref 13–17.7)
IMM GRANULOCYTES # BLD AUTO: 0.04 10*3/MM3 (ref 0–0.05)
IMM GRANULOCYTES NFR BLD AUTO: 0.7 % (ref 0–0.5)
INTERPRETATION: NORMAL
LDLC SERPL CALC-MCNC: 121 MG/DL (ref 0–100)
LYMPHOCYTES # BLD AUTO: 1.87 10*3/MM3 (ref 0.7–3.1)
LYMPHOCYTES NFR BLD AUTO: 30.9 % (ref 19.6–45.3)
Lab: NORMAL
MCH RBC QN AUTO: 27.3 PG (ref 26.6–33)
MCHC RBC AUTO-ENTMCNC: 34.1 G/DL (ref 31.5–35.7)
MCV RBC AUTO: 80.1 FL (ref 79–97)
MICROALBUMIN UR-MCNC: 17.3 UG/ML
MONOCYTES # BLD AUTO: 0.66 10*3/MM3 (ref 0.1–0.9)
MONOCYTES NFR BLD AUTO: 10.9 % (ref 5–12)
NEUTROPHILS # BLD AUTO: 3.32 10*3/MM3 (ref 1.7–7)
NEUTROPHILS NFR BLD AUTO: 54.8 % (ref 42.7–76)
NRBC BLD AUTO-RTO: 0.2 /100 WBC (ref 0–0.2)
PLATELET # BLD AUTO: 207 10*3/MM3 (ref 140–450)
POTASSIUM SERPL-SCNC: 4.3 MMOL/L (ref 3.5–5.2)
PROT SERPL-MCNC: 6.3 G/DL (ref 6–8.5)
RBC # BLD AUTO: 5.34 10*6/MM3 (ref 4.14–5.8)
SODIUM SERPL-SCNC: 138 MMOL/L (ref 136–145)
TESTOST FREE SERPL-MCNC: 10.5 PG/ML (ref 6.6–18.1)
TESTOST SERPL-MCNC: 539 NG/DL (ref 264–916)
TRIGL SERPL-MCNC: 121 MG/DL (ref 0–150)
TSH SERPL DL<=0.005 MIU/L-ACNC: 1.62 UIU/ML (ref 0.27–4.2)
VLDLC SERPL CALC-MCNC: 24.2 MG/DL
WBC # BLD AUTO: 6.05 10*3/MM3 (ref 3.4–10.8)

## 2020-10-09 DIAGNOSIS — E29.1 MALE HYPOGONADISM: ICD-10-CM

## 2020-10-09 RX ORDER — TESTOSTERONE 16.2 MG/G
GEL TRANSDERMAL
OUTPATIENT
Start: 2020-10-09

## 2020-10-12 ENCOUNTER — TELEPHONE (OUTPATIENT)
Dept: ENDOCRINOLOGY | Age: 70
End: 2020-10-12

## 2020-10-12 NOTE — TELEPHONE ENCOUNTER
Pt called stated he has called several times and left message and not one has returned his calls. Pt said he is having problems with his medicatons and has questions concerning them Pt is scheduled to see Dr. Busch in Nov., but in the meantime is requesting a refill of his Testosterone gel. Pt requesting to please be called back, concerning his Januvia.

## 2020-10-13 ENCOUNTER — TELEPHONE (OUTPATIENT)
Dept: ENDOCRINOLOGY | Age: 70
End: 2020-10-13

## 2020-10-13 DIAGNOSIS — R79.89 LOW TESTOSTERONE: ICD-10-CM

## 2020-10-13 DIAGNOSIS — E11.9 NON-INSULIN DEPENDENT TYPE 2 DIABETES MELLITUS (HCC): Primary | ICD-10-CM

## 2020-10-13 NOTE — TELEPHONE ENCOUNTER
Called and spoke with patient  Patient wanted to see if dr busch could send in prescription for his androgel and ozempic  Let patient know that I have spoke with Dr Busch and she denied the refills because she wanted to see patient first patient voice understanding  We moved pt appt sooner so he could see dr busch pt will see Dr busch on 10/27/20 @ 9:45 a

## 2020-10-13 NOTE — TELEPHONE ENCOUNTER
Pt called back upset that he can not get script...advised I do see where he seen [place] and due to [facility] no longer being at the [facility] we would need to get him established with another provider before another script could be called..the patient advised he seen [facility] and she put him on a med that worked really good and he wanted to continue meds.advised pt dr masters wants to make sure he's on correct med and that's why shes requesting to see him 1st before any new script would be called in.the patient stated bad practice that she would not continue med from previous dr..advised all prvdrs have a way of progress with their pts and this would be in the best interest of pts to move fwrd with practice

## 2020-10-13 NOTE — TELEPHONE ENCOUNTER
LEFT VOICE MAIL FOR PATIENT TO CALL THE OFFICE BACK  PATIENT TESTOSTERONE WAS DENIED BY Dr Mills  HE NEEDS TO BE SEEN IN OFFICE

## 2020-10-13 NOTE — TELEPHONE ENCOUNTER
Pt stated he was not called back timely about refill..advised pt script called in 10/9/2020 which was a Friday and we had to allow prvdr time to review chart since he had not seen her before she prescribed anything.he was then called from [place] on Tuesday, 10/13/2020, which was timely turn around time we were notified of script request...

## 2020-10-26 DIAGNOSIS — E55.9 VITAMIN D DEFICIENCY: ICD-10-CM

## 2020-10-26 RX ORDER — ERGOCALCIFEROL 1.25 MG/1
CAPSULE ORAL
Qty: 13 CAPSULE | Refills: 3 | OUTPATIENT
Start: 2020-10-26

## 2020-11-19 ENCOUNTER — OFFICE VISIT (OUTPATIENT)
Dept: FAMILY MEDICINE CLINIC | Facility: CLINIC | Age: 70
End: 2020-11-19

## 2020-11-19 VITALS
WEIGHT: 165.8 LBS | DIASTOLIC BLOOD PRESSURE: 66 MMHG | HEART RATE: 81 BPM | TEMPERATURE: 97.3 F | HEIGHT: 64 IN | BODY MASS INDEX: 28.31 KG/M2 | OXYGEN SATURATION: 97 % | SYSTOLIC BLOOD PRESSURE: 108 MMHG

## 2020-11-19 DIAGNOSIS — E55.9 VITAMIN D DEFICIENCY: ICD-10-CM

## 2020-11-19 DIAGNOSIS — E78.2 MIXED HYPERLIPIDEMIA: ICD-10-CM

## 2020-11-19 DIAGNOSIS — Z00.00 MEDICARE ANNUAL WELLNESS VISIT, SUBSEQUENT: Primary | ICD-10-CM

## 2020-11-19 DIAGNOSIS — E11.9 NON-INSULIN DEPENDENT TYPE 2 DIABETES MELLITUS (HCC): ICD-10-CM

## 2020-11-19 PROCEDURE — 99214 OFFICE O/P EST MOD 30 MIN: CPT | Performed by: INTERNAL MEDICINE

## 2020-11-19 PROCEDURE — G0439 PPPS, SUBSEQ VISIT: HCPCS | Performed by: INTERNAL MEDICINE

## 2020-11-19 RX ORDER — ERGOCALCIFEROL 1.25 MG/1
50000 CAPSULE ORAL
Qty: 4 CAPSULE | Refills: 5 | Status: ON HOLD | OUTPATIENT
Start: 2020-11-19 | End: 2021-02-21

## 2020-11-19 RX ORDER — TESTOSTERONE 16.2 MG/G
81 GEL TRANSDERMAL DAILY
COMMUNITY
Start: 2020-10-26 | End: 2020-11-25

## 2020-11-19 RX ORDER — SEMAGLUTIDE 1.34 MG/ML
0.5 INJECTION, SOLUTION SUBCUTANEOUS WEEKLY
COMMUNITY
Start: 2020-10-26 | End: 2021-02-25 | Stop reason: SDUPTHER

## 2020-11-19 NOTE — PROGRESS NOTES
The ABCs of the Annual Wellness Visit  Subsequent Medicare Wellness Visit    Chief Complaint   Patient presents with   • Medicare Wellness-subsequent       Subjective   History of Present Illness:  Brian Garcia is a 70 y.o. male who presents for a Subsequent Medicare Wellness Visit patient was seen for a Medicare wellness exam..  Patient was seen for diabetes.  Patient blood sugar has been running in the 120s to 130s.  Patient blood sugar was elevated in the 170s but was not taking his medicine correctly.  Patient restarted his Jardiance 25 mg daily with Ozempic and sugars return to the 120s.  Patient will also continue his diabetic diet low impact exercise.  Patient's lipids are being treated with diet and exercise.  Triglycerides 131, HDL 38, .  Patient will continue his present treatment and recheck in 4 months.  If elevated patient will start a statin.  Patient has vitamin D3 deficiency supplement with over-the-counter D3.    Dictated utilizing Dragon dictation. If there are questions or for further clarification, please contact me.    HEALTH RISK ASSESSMENT    Recent Hospitalizations:  Recently treated at the following:  [facility]    Current Medical Providers:  Patient Care Team:  Andrey Wolfe MD as PCP - General (Internal Medicine)  Andrey Wolfe MD as Consulting Physician (Internal Medicine)  Leadnro Rodas MD as Consulting Physician (Urology)    Smoking Status:  Social History     Tobacco Use   Smoking Status Never Smoker   Smokeless Tobacco Never Used       Alcohol Consumption:  Social History     Substance and Sexual Activity   Alcohol Use No       Depression Screen:   PHQ-2/PHQ-9 Depression Screening 11/19/2020   Little interest or pleasure in doing things 0   Feeling down, depressed, or hopeless 0   Trouble falling or staying asleep, or sleeping too much 0   Feeling tired or having little energy 0   Poor appetite or overeating 0   Feeling bad about yourself - or  that you are a failure or have let yourself or your family down 0   Trouble concentrating on things, such as reading the newspaper or watching television 0   Moving or speaking so slowly that other people could have noticed. Or the opposite - being so fidgety or restless that you have been moving around a lot more than usual 0   Thoughts that you would be better off dead, or of hurting yourself in some way 0   Total Score 0   If you checked off any problems, how difficult have these problems made it for you to do your work, take care of things at home, or get along with other people? Not difficult at all       Fall Risk Screen:  ALEXANDER Fall Risk Assessment was completed, and patient is at LOW risk for falls.Assessment completed on:5/21/2020    Health Habits and Functional and Cognitive Screening:  Functional & Cognitive Status 11/19/2020   Do you have difficulty preparing food and eating? No   Do you have difficulty bathing yourself, getting dressed or grooming yourself? No   Do you have difficulty using the toilet? No   Do you have difficulty moving around from place to place? No   Do you have trouble with steps or getting out of a bed or a chair? No   Current Diet Well Balanced Diet   Dental Exam Up to date   Eye Exam Up to date   Exercise (times per week) 5 times per week   Current Exercises Include Walking   Current Exercise Activities Include -   Do you need help using the phone?  No   Are you deaf or do you have serious difficulty hearing?  Yes   Do you need help with transportation? No   Do you need help shopping? No   Do you need help preparing meals?  No   Do you need help with housework?  No   Do you need help with laundry? No   Do you need help taking your medications? No   Do you need help managing money? No   Do you ever drive or ride in a car without wearing a seat belt? No   Have you felt unusual stress, anger or loneliness in the last month? No   Who do you live with? Spouse   If you need help, do  you have trouble finding someone available to you? No   Have you been bothered in the last four weeks by sexual problems? No   Do you have difficulty concentrating, remembering or making decisions? No         Does the patient have evidence of cognitive impairment? No    Asprin use counseling:Does not need ASA (and currently is not on it)    Age-appropriate Screening Schedule:  Refer to the list below for future screening recommendations based on patient's age, sex and/or medical conditions. Orders for these recommended tests are listed in the plan section. The patient has been provided with a written plan.    Health Maintenance   Topic Date Due   • TDAP/TD VACCINES (1 - Tdap) 05/31/1970   • ZOSTER VACCINE (2 of 3) 10/18/2016   • DIABETIC FOOT EXAM  04/23/2018   • INFLUENZA VACCINE  08/01/2020   • COLONOSCOPY  02/15/2021   • HEMOGLOBIN A1C  03/30/2021   • LIPID PANEL  09/30/2021   • URINE MICROALBUMIN  09/30/2021   • DIABETIC EYE EXAM  10/20/2021          The following portions of the patient's history were reviewed and updated as appropriate: past family history, past medical history, past social history, past surgical history and problem list.    Outpatient Medications Prior to Visit   Medication Sig Dispense Refill   • JARDIANCE 25 MG tablet TAKE 1 TABLET BY MOUTH EVERY MORNING 90 tablet 1   • Multiple Vitamins-Minerals (MULTIVITAL-M PO) Take 1 tablet by mouth Daily.     • Semaglutide,0.25 or 0.5MG/DOS, (Ozempic, 0.25 or 0.5 MG/DOSE,) 2 MG/1.5ML solution pen-injector Inject 0.5 mg under the skin into the appropriate area as directed.     • Testosterone 20.25 MG/ACT (1.62%) gel Apply 81 mg topically to the appropriate area as directed Daily.     • Testosterone 20.25 MG/ACT (1.62%) gel 2 pumps to each shoulder, upper chest daily (Patient taking differently: Take As Directed. 2 pumps to each shoulder, upper chest daily  HELD FOR SURGERY) 162 g 0   • vitamin D (ERGOCALCIFEROL) 43919 units capsule capsule 1 capsule  "once a week (Patient taking differently: 50,000 Units Every 7 (Seven) Days. 1 capsule once a week) 13 capsule 3   • mupirocin (Bactroban) 2 % ointment Apply  topically to the appropriate area as directed 3 (Three) Times a Day. 22 g 3   • Needle, Disp, 22G X 1\" misc 1 inj weekly 12 each 4   • Syringe 18G X 1-1/2\" 3 ML misc 1 inj every week 12 each 4   • HYDROcodone-acetaminophen (NORCO) 5-325 MG per tablet Take 1 tablet by mouth Every 6 (Six) Hours As Needed for Moderate Pain . 20 tablet 0   • JANUVIA 100 MG tablet Take 1 tablet by mouth Daily. 30 tablet 11   • methylPREDNISolone (MEDROL, CORBY,) 4 MG tablet Take as directed on package instructions. 1 each 0     No facility-administered medications prior to visit.        Patient Active Problem List   Diagnosis   • Acute bronchitis   • Acute pharyngitis   • Acute upper respiratory infection   • Allergic rhinitis   • Carpal tunnel syndrome   • Cough   • Non-insulin dependent type 2 diabetes mellitus (CMS/HCC)   • Alimentary obesity   • Fatigue   • Effusion of knee   • ED (erectile dysfunction) of organic origin   • Muscle ache   • Thumb pain   • Welcome to Medicare preventive visit   • Mixed hyperlipidemia   • Vitamin D deficiency   • Refusal of statin medication by patient   • Medication management   • Male hypogonadism   • Low testosterone   • Benign prostatic hyperplasia without lower urinary tract symptoms   • Hives       Advanced Care Planning:  ACP discussion was held with the patient during this visit. Patient has an advance directive in EMR which is still valid.     Review of Systems   Constitutional: Negative for fatigue and fever.   HENT: Positive for congestion. Negative for trouble swallowing.    Eyes: Negative for discharge and visual disturbance.   Respiratory: Negative for choking and shortness of breath.    Cardiovascular: Negative for chest pain and palpitations.   Gastrointestinal: Negative for abdominal pain and blood in stool.   Endocrine: Negative.  " "  Genitourinary: Negative for genital sores and hematuria.   Musculoskeletal: Negative for gait problem and joint swelling.   Skin: Negative for color change, pallor, rash and wound.   Allergic/Immunologic: Positive for environmental allergies. Negative for immunocompromised state.   Neurological: Negative for facial asymmetry and speech difficulty.   Psychiatric/Behavioral: Negative for hallucinations and suicidal ideas.       Compared to one year ago, the patient feels his physical health is the same.  Compared to one year ago, the patient feels his mental health is the same.    Reviewed chart for potential of high risk medication in the elderly: yes  Reviewed chart for potential of harmful drug interactions in the elderly:yes    Objective         Vitals:    11/19/20 1041   BP: 108/66   Pulse: 81   Temp: 97.3 °F (36.3 °C)   SpO2: 97%   Weight: 75.2 kg (165 lb 12.8 oz)   Height: 162.6 cm (64\")       Body mass index is 28.46 kg/m².  Discussed the patient's BMI with him. The BMI is in the acceptable range.    Physical Exam  Vitals signs and nursing note reviewed.   Constitutional:       Appearance: Normal appearance. He is well-developed.   HENT:      Head: Normocephalic and atraumatic.      Nose: Nose normal.      Mouth/Throat:      Mouth: Mucous membranes are moist.      Pharynx: Oropharynx is clear.   Eyes:      Extraocular Movements: Extraocular movements intact.      Conjunctiva/sclera: Conjunctivae normal.      Pupils: Pupils are equal, round, and reactive to light.   Neck:      Musculoskeletal: Normal range of motion and neck supple.   Cardiovascular:      Rate and Rhythm: Normal rate and regular rhythm.      Heart sounds: Normal heart sounds. No murmur. No friction rub. No gallop.    Pulmonary:      Effort: Pulmonary effort is normal. No respiratory distress.      Breath sounds: Normal breath sounds. No stridor. No wheezing, rhonchi or rales.   Chest:      Chest wall: No tenderness.   Abdominal:      General: " Bowel sounds are normal.      Palpations: Abdomen is soft.   Musculoskeletal: Normal range of motion.   Skin:     General: Skin is warm and dry.   Neurological:      General: No focal deficit present.      Mental Status: He is alert and oriented to person, place, and time. Mental status is at baseline.   Psychiatric:         Mood and Affect: Mood normal.         Behavior: Behavior normal.         Thought Content: Thought content normal.         Judgment: Judgment normal.         Lab Results   Component Value Date     (H) 09/30/2020    CHLPL 183 09/30/2020    TRIG 121 09/30/2020    HDL 38 (L) 09/30/2020     (H) 09/30/2020    VLDL 24.2 09/30/2020    HGBA1C 9.20 (H) 09/30/2020        Assessment/Plan #1 restart Jardiance and Ozempic No. 2 restart diet and physical activity #3 continue to monitor blood pressure and blood sugar at home  Medicare Risks and Personalized Health Plan  CMS Preventative Services Quick Reference  Advance Directive Discussion  Dementia/Memory   Depression/Dysphoria  Fall Risk  Hearing Problem    The above risks/problems have been discussed with the patient.  Pertinent information has been shared with the patient in the After Visit Summary.  Follow up plans and orders are seen below in the Assessment/Plan Section.    Diagnoses and all orders for this visit:    1. Medicare annual wellness visit, subsequent (Primary)    2. Non-insulin dependent type 2 diabetes mellitus (CMS/[facility])    3. Mixed hyperlipidemia    4. Vitamin D deficiency  -     vitamin D (ERGOCALCIFEROL) 1.25 MG (16414 UT) capsule capsule; Take 1 capsule by mouth Every 7 (Seven) Days. 1 capsule once a week  Dispense: 4 capsule; Refill: 5      Follow Up:  Return in about 4 months (around 3/19/2021), or if symptoms worsen or fail to improve, for Recheck.     An After Visit Summary and PPPS were given to the patient.             Answers for HPI/ROS submitted by the patient on 11/19/2020   What is the primary reason for your  visit?: Other  Please describe your symptoms.: This is check up.  Have you had these symptoms before?: No  How long have you been having these symptoms?: 1-4 days

## 2020-11-19 NOTE — PROGRESS NOTES
The ABCs of the Annual Wellness Visit  Subsequent Medicare Wellness Visit    Chief Complaint   Patient presents with   • Medicare Wellness-subsequent       Subjective   History of Present Illness:  Brian Garcia is a 70 y.o. male who presents for a Subsequent Medicare Wellness Visit.     HEALTH RISK ASSESSMENT    Recent Hospitalizations:  Recently treated at the following:  {select location:[id number]}    Current Medical Providers:  Patient Care Team:  Andrey Wolfe MD as PCP - General (Internal Medicine)  Andrey Wolfe MD as Consulting Physician (Internal Medicine)  Leandro Rodas MD as Consulting Physician (Urology)    Smoking Status:  Social History     Tobacco Use   Smoking Status Never Smoker   Smokeless Tobacco Never Used       Alcohol Consumption:  Social History     Substance and Sexual Activity   Alcohol Use No       Depression Screen:   PHQ-2/PHQ-9 Depression Screening 11/19/2020   Little interest or pleasure in doing things 0   Feeling down, depressed, or hopeless 0   Trouble falling or staying asleep, or sleeping too much 0   Feeling tired or having little energy 0   Poor appetite or overeating 0   Feeling bad about yourself - or that you are a failure or have let yourself or your family down 0   Trouble concentrating on things, such as reading the newspaper or watching television 0   Moving or speaking so slowly that other people could have noticed. Or the opposite - being so fidgety or restless that you have been moving around a lot more than usual 0   Thoughts that you would be better off dead, or of hurting yourself in some way 0   Total Score 0   If you checked off any problems, how difficult have these problems made it for you to do your work, take care of things at home, or get along with other people? Not difficult at all       Fall Risk Screen:  STEADI Fall Risk Assessment was completed, and patient is at LOW risk for falls.Assessment completed on:5/21/2020    Health Habits and  "Functional and Cognitive Screening:  Functional & Cognitive Status 11/19/2020   Do you have difficulty preparing food and eating? No   Do you have difficulty bathing yourself, getting dressed or grooming yourself? No   Do you have difficulty using the toilet? No   Do you have difficulty moving around from place to place? No   Do you have trouble with steps or getting out of a bed or a chair? No   Current Diet Well Balanced Diet   Dental Exam Up to date   Eye Exam Up to date   Exercise (times per week) 5 times per week   Current Exercises Include Walking   Current Exercise Activities Include -   Do you need help using the phone?  No   Are you deaf or do you have serious difficulty hearing?  Yes   Do you need help with transportation? No   Do you need help shopping? No   Do you need help preparing meals?  No   Do you need help with housework?  No   Do you need help with laundry? No   Do you need help taking your medications? No   Do you need help managing money? No   Do you ever drive or ride in a car without wearing a seat belt? No   Have you felt unusual stress, anger or loneliness in the last month? No   Who do you live with? Spouse   If you need help, do you have trouble finding someone available to you? No   Have you been bothered in the last four weeks by sexual problems? No   Do you have difficulty concentrating, remembering or making decisions? No         Does the patient have evidence of cognitive impairment? {Yes/No w/ pre-defaulted No:92909::\"No\"}    Asprin use counseling:{Aspirin :70380}    Age-appropriate Screening Schedule:  Refer to the list below for future screening recommendations based on patient's age, sex and/or medical conditions. Orders for these recommended tests are listed in the plan section. The patient has been provided with a written plan.    Health Maintenance   Topic Date Due   • TDAP/TD VACCINES (1 - Tdap) 05/31/1970   • ZOSTER VACCINE (2 of 3) 10/18/2016   • DIABETIC FOOT EXAM  " "04/23/2018   • INFLUENZA VACCINE  08/01/2020   • COLONOSCOPY  02/15/2021   • HEMOGLOBIN A1C  03/30/2021   • LIPID PANEL  09/30/2021   • URINE MICROALBUMIN  09/30/2021   • DIABETIC EYE EXAM  10/20/2021          The following portions of the patient's history were reviewed and updated as appropriate: {history reviewed:20406::\"allergies\",\"current medications\",\"past family history\",\"past medical history\",\"past social history\",\"past surgical history\",\"problem list\"}.    Outpatient Medications Prior to Visit   Medication Sig Dispense Refill   • JARDIANCE 25 MG tablet TAKE 1 TABLET BY MOUTH EVERY MORNING 90 tablet 1   • Multiple Vitamins-Minerals (MULTIVITAL-M PO) Take 1 tablet by mouth Daily.     • Semaglutide,0.25 or 0.5MG/DOS, (Ozempic, 0.25 or 0.5 MG/DOSE,) 2 MG/1.5ML solution pen-injector Inject 0.5 mg under the skin into the appropriate area as directed.     • Testosterone 20.25 MG/ACT (1.62%) gel Apply 81 mg topically to the appropriate area as directed Daily.     • vitamin D (ERGOCALCIFEROL) 02347 units capsule capsule 1 capsule once a week (Patient taking differently: 50,000 Units Every 7 (Seven) Days. 1 capsule once a week) 13 capsule 3   • Testosterone 20.25 MG/ACT (1.62%) gel 2 pumps to each shoulder, upper chest daily (Patient taking differently: Take As Directed. 2 pumps to each shoulder, upper chest daily  HELD FOR SURGERY) 162 g 0   • HYDROcodone-acetaminophen (NORCO) 5-325 MG per tablet Take 1 tablet by mouth Every 6 (Six) Hours As Needed for Moderate Pain . 20 tablet 0   • methylPREDNISolone (MEDROL, CORBY,) 4 MG tablet Take as directed on package instructions. 1 each 0   • mupirocin (Bactroban) 2 % ointment Apply  topically to the appropriate area as directed 3 (Three) Times a Day. 22 g 3   • Needle, Disp, 22G X 1\" misc 1 inj weekly 12 each 4   • Syringe 18G X 1-1/2\" 3 ML misc 1 inj every week 12 each 4   • JANUVIA 100 MG tablet Take 1 tablet by mouth Daily. 30 tablet 11     No facility-administered " "medications prior to visit.        Patient Active Problem List   Diagnosis   • Acute bronchitis   • Acute pharyngitis   • Acute upper respiratory infection   • Allergic rhinitis   • Carpal tunnel syndrome   • Cough   • Non-insulin dependent type 2 diabetes mellitus (CMS/[facility])   • Alimentary obesity   • Fatigue   • Effusion of knee   • ED (erectile dysfunction) of organic origin   • Muscle ache   • Thumb pain   • Welcome to Medicare preventive visit   • Mixed hyperlipidemia   • Vitamin D deficiency   • Refusal of statin medication by patient   • Medication management   • Male hypogonadism   • Low testosterone   • Benign prostatic hyperplasia without lower urinary tract symptoms   • Hives       Advanced Care Planning:  {Advanced Directive Status:98463}    Review of Systems    Compared to one year ago, the patient feels his physical health is {better worse same:55597}.  Compared to one year ago, the patient feels his mental health is {better worse same:71647}.    Reviewed chart for potential of high risk medication in the elderly: {Response;Yes/No/NA:0005634270::\"yes\"}  Reviewed chart for potential of harmful drug interactions in the elderly:{Response;Yes/No/NA:5571730279::\"yes\"}    Objective         Vitals:    11/19/20 1041   BP: 108/66   Pulse: 81   Temp: 97.3 °F (36.3 °C)   SpO2: 97%   Weight: 75.2 kg (165 lb 12.8 oz)   Height: 162.6 cm (64\")       Body mass index is 28.46 kg/m².  Discussed the patient's BMI with him. The BMI {[facility]78503}.    Physical Exam    Lab Results   Component Value Date     (H) 09/30/2020    CHLPL 183 09/30/2020    TRIG 121 09/30/2020    HDL 38 (L) 09/30/2020     (H) 09/30/2020    VLDL 24.2 09/30/2020    HGBA1C 9.20 (H) 09/30/2020        Assessment/Plan   Medicare Risks and Personalized Health Plan  CMS Preventative Services Quick Reference  {Medicare Wellness Risk Factors and Personalized Health Plan:84941}    The above risks/problems have been discussed " with the patient.  Pertinent information has been shared with the patient in the After Visit Summary.  Follow up plans and orders are seen below in the Assessment/Plan Section.    There are no diagnoses linked to this encounter.  Follow Up:  No follow-ups on file.     An After Visit Summary and PPPS were given to the patient.             Answers for HPI/ROS submitted by the patient on 11/19/2020   What is the primary reason for your visit?: Other  Please describe your symptoms.: This is check up.  Have you had these symptoms before?: No  How long have you been having these symptoms?: 1-4 days

## 2020-11-19 NOTE — PATIENT INSTRUCTIONS
Medicare Wellness  Personal Prevention Plan of Service     Date of Office Visit:  2020  Encounter Provider:  Andrey Wolfe MD  Place of Service:  [facility]  Patient Name: Brian Garcia  :  1950    As part of the Medicare Wellness portion of your visit today, we are providing you with this personalized preventive plan of services (PPPS). This plan is based upon recommendations of the [place] Preventive Services Task Force (USPSTF) and the Advisory Committee on Immunization Practices (ACIP).    This lists the preventive care services that should be considered, and provides dates of when you are due. Items listed as completed are up-to-date and do not require any further intervention.    Health Maintenance   Topic Date Due   • TDAP/TD VACCINES (1 - Tdap) 1970   • Pneumococcal Vaccine 65+ (1 of 1 - PPSV23) 2016   • ZOSTER VACCINE (2 of 3) 10/18/2016   • DIABETIC FOOT EXAM  2018   • ANNUAL WELLNESS VISIT  2020   • INFLUENZA VACCINE  2020   • COLONOSCOPY  02/15/2021   • HEMOGLOBIN A1C  2021   • LIPID PANEL  2021   • URINE MICROALBUMIN  2021   • DIABETIC EYE EXAM  10/20/2021   • HEPATITIS C SCREENING  Addressed       No orders of the defined types were placed in this encounter.      Return in about 6 months (around 2021), or if symptoms worsen or fail to improve, for Recheck.

## 2020-11-19 NOTE — PROGRESS NOTES
The ABCs of the Annual Wellness Visit  Subsequent Medicare Wellness Visit    Chief Complaint   Patient presents with   • Medicare Wellness-subsequent       Subjective   History of Present Illness:  Brian Garcia is a 70 y.o. male who presents for a Subsequent Medicare Wellness Visit.     HEALTH RISK ASSESSMENT    Recent Hospitalizations:  Recently treated at the following:  {select location:[id number]}    Current Medical Providers:  Patient Care Team:  Andrey Wolfe MD as PCP - General (Internal Medicine)  Andrey Wolfe MD as Consulting Physician (Internal Medicine)  Leandro Rodas MD as Consulting Physician (Urology)    Smoking Status:  Social History     Tobacco Use   Smoking Status Never Smoker   Smokeless Tobacco Never Used       Alcohol Consumption:  Social History     Substance and Sexual Activity   Alcohol Use No       Depression Screen:   PHQ-2/PHQ-9 Depression Screening 11/19/2020   Little interest or pleasure in doing things 0   Feeling down, depressed, or hopeless 0   Trouble falling or staying asleep, or sleeping too much 0   Feeling tired or having little energy 0   Poor appetite or overeating 0   Feeling bad about yourself - or that you are a failure or have let yourself or your family down 0   Trouble concentrating on things, such as reading the newspaper or watching television 0   Moving or speaking so slowly that other people could have noticed. Or the opposite - being so fidgety or restless that you have been moving around a lot more than usual 0   Thoughts that you would be better off dead, or of hurting yourself in some way 0   Total Score 0   If you checked off any problems, how difficult have these problems made it for you to do your work, take care of things at home, or get along with other people? Not difficult at all       Fall Risk Screen:  STEADI Fall Risk Assessment was completed, and patient is at LOW risk for falls.Assessment completed on:5/21/2020    Health Habits and  "Functional and Cognitive Screening:  Functional & Cognitive Status 11/19/2020   Do you have difficulty preparing food and eating? No   Do you have difficulty bathing yourself, getting dressed or grooming yourself? No   Do you have difficulty using the toilet? No   Do you have difficulty moving around from place to place? No   Do you have trouble with steps or getting out of a bed or a chair? No   Current Diet Well Balanced Diet   Dental Exam Up to date   Eye Exam Up to date   Exercise (times per week) 5 times per week   Current Exercises Include Walking   Current Exercise Activities Include -   Do you need help using the phone?  No   Are you deaf or do you have serious difficulty hearing?  Yes   Do you need help with transportation? No   Do you need help shopping? No   Do you need help preparing meals?  No   Do you need help with housework?  No   Do you need help with laundry? No   Do you need help taking your medications? No   Do you need help managing money? No   Do you ever drive or ride in a car without wearing a seat belt? No   Have you felt unusual stress, anger or loneliness in the last month? No   Who do you live with? Spouse   If you need help, do you have trouble finding someone available to you? No   Have you been bothered in the last four weeks by sexual problems? No   Do you have difficulty concentrating, remembering or making decisions? No         Does the patient have evidence of cognitive impairment? {Yes/No w/ pre-defaulted No:38683::\"No\"}    Asprin use counseling:{Aspirin :21693}    Age-appropriate Screening Schedule:  Refer to the list below for future screening recommendations based on patient's age, sex and/or medical conditions. Orders for these recommended tests are listed in the plan section. The patient has been provided with a written plan.    Health Maintenance   Topic Date Due   • TDAP/TD VACCINES (1 - Tdap) 05/31/1970   • ZOSTER VACCINE (2 of 3) 10/18/2016   • DIABETIC FOOT EXAM  " "04/23/2018   • INFLUENZA VACCINE  08/01/2020   • COLONOSCOPY  02/15/2021   • HEMOGLOBIN A1C  03/30/2021   • LIPID PANEL  09/30/2021   • URINE MICROALBUMIN  09/30/2021   • DIABETIC EYE EXAM  10/20/2021          The following portions of the patient's history were reviewed and updated as appropriate: {history reviewed:20406::\"allergies\",\"current medications\",\"past family history\",\"past medical history\",\"past social history\",\"past surgical history\",\"problem list\"}.    Outpatient Medications Prior to Visit   Medication Sig Dispense Refill   • JARDIANCE 25 MG tablet TAKE 1 TABLET BY MOUTH EVERY MORNING 90 tablet 1   • Multiple Vitamins-Minerals (MULTIVITAL-M PO) Take 1 tablet by mouth Daily.     • Semaglutide,0.25 or 0.5MG/DOS, (Ozempic, 0.25 or 0.5 MG/DOSE,) 2 MG/1.5ML solution pen-injector Inject 0.5 mg under the skin into the appropriate area as directed.     • Testosterone 20.25 MG/ACT (1.62%) gel Apply 81 mg topically to the appropriate area as directed Daily.     • Testosterone 20.25 MG/ACT (1.62%) gel 2 pumps to each shoulder, upper chest daily (Patient taking differently: Take As Directed. 2 pumps to each shoulder, upper chest daily  HELD FOR SURGERY) 162 g 0   • vitamin D (ERGOCALCIFEROL) 16005 units capsule capsule 1 capsule once a week (Patient taking differently: 50,000 Units Every 7 (Seven) Days. 1 capsule once a week) 13 capsule 3   • mupirocin (Bactroban) 2 % ointment Apply  topically to the appropriate area as directed 3 (Three) Times a Day. 22 g 3   • Needle, Disp, 22G X 1\" misc 1 inj weekly 12 each 4   • Syringe 18G X 1-1/2\" 3 ML misc 1 inj every week 12 each 4   • HYDROcodone-acetaminophen (NORCO) 5-325 MG per tablet Take 1 tablet by mouth Every 6 (Six) Hours As Needed for Moderate Pain . 20 tablet 0   • JANUVIA 100 MG tablet Take 1 tablet by mouth Daily. 30 tablet 11   • methylPREDNISolone (MEDROL, CORBY,) 4 MG tablet Take as directed on package instructions. 1 each 0     No facility-administered " "medications prior to visit.        Patient Active Problem List   Diagnosis   • Acute bronchitis   • Acute pharyngitis   • Acute upper respiratory infection   • Allergic rhinitis   • Carpal tunnel syndrome   • Cough   • Non-insulin dependent type 2 diabetes mellitus (CMS/[facility])   • Alimentary obesity   • Fatigue   • Effusion of knee   • ED (erectile dysfunction) of organic origin   • Muscle ache   • Thumb pain   • Welcome to Medicare preventive visit   • Mixed hyperlipidemia   • Vitamin D deficiency   • Refusal of statin medication by patient   • Medication management   • Male hypogonadism   • Low testosterone   • Benign prostatic hyperplasia without lower urinary tract symptoms   • Hives       Advanced Care Planning:  {Advanced Directive Status:27217}    Review of Systems    Compared to one year ago, the patient feels his physical health is {better worse same:53239}.  Compared to one year ago, the patient feels his mental health is {better worse same:09328}.    Reviewed chart for potential of high risk medication in the elderly: {Response;Yes/No/NA:7671889844::\"yes\"}  Reviewed chart for potential of harmful drug interactions in the elderly:{Response;Yes/No/NA:1123184356::\"yes\"}    Objective         Vitals:    11/19/20 1041   BP: 108/66   Pulse: 81   Temp: 97.3 °F (36.3 °C)   SpO2: 97%   Weight: 75.2 kg (165 lb 12.8 oz)   Height: 162.6 cm (64\")       Body mass index is 28.46 kg/m².  Discussed the patient's BMI with him. The BMI {[facility]88336}.    Physical Exam    Lab Results   Component Value Date     (H) 09/30/2020    CHLPL 183 09/30/2020    TRIG 121 09/30/2020    HDL 38 (L) 09/30/2020     (H) 09/30/2020    VLDL 24.2 09/30/2020    HGBA1C 9.20 (H) 09/30/2020        Assessment/Plan   Medicare Risks and Personalized Health Plan  CMS Preventative Services Quick Reference  {Medicare Wellness Risk Factors and Personalized Health Plan:72703}    The above risks/problems have been discussed " with the patient.  Pertinent information has been shared with the patient in the After Visit Summary.  Follow up plans and orders are seen below in the Assessment/Plan Section.    Diagnoses and all orders for this visit:    1. Medicare annual wellness visit, subsequent (Primary)    2. Non-insulin dependent type 2 diabetes mellitus (CMS/HCC)    3. Mixed hyperlipidemia    4. Vitamin D deficiency  -     vitamin D (ERGOCALCIFEROL) 1.25 MG (48701 UT) capsule capsule; Take 1 capsule by mouth Every 7 (Seven) Days. 1 capsule once a week  Dispense: 4 capsule; Refill: 5      Follow Up:  Return in about 4 months (around 3/19/2021), or if symptoms worsen or fail to improve, for Recheck.     An After Visit Summary and PPPS were given to the patient.             Answers for HPI/ROS submitted by the patient on 11/19/2020   What is the primary reason for your visit?: Other  Please describe your symptoms.: This is check up.  Have you had these symptoms before?: No  How long have you been having these symptoms?: 1-4 days

## 2021-02-21 ENCOUNTER — APPOINTMENT (OUTPATIENT)
Dept: CT IMAGING | Facility: HOSPITAL | Age: 71
End: 2021-02-21

## 2021-02-21 ENCOUNTER — HOSPITAL ENCOUNTER (OUTPATIENT)
Facility: HOSPITAL | Age: 71
Setting detail: OBSERVATION
Discharge: HOME | End: 2021-02-22
Attending: EMERGENCY MEDICINE | Admitting: INTERNAL MEDICINE

## 2021-02-21 DIAGNOSIS — R27.0 ATAXIA: ICD-10-CM

## 2021-02-21 DIAGNOSIS — R42 DIZZINESS: Primary | ICD-10-CM

## 2021-02-21 LAB
ALBUMIN SERPL-MCNC: 4.2 G/DL (ref 3.5–5.2)
ALBUMIN/GLOB SERPL: 1.6 G/DL
ALP SERPL-CCNC: 24 U/L (ref 39–117)
ALT SERPL W P-5'-P-CCNC: 19 U/L (ref 1–41)
ANION GAP SERPL CALCULATED.3IONS-SCNC: 12.8 MMOL/L (ref 5–15)
AST SERPL-CCNC: 20 U/L (ref 1–40)
BASOPHILS # BLD AUTO: 0.05 10*3/MM3 (ref 0–0.2)
BASOPHILS NFR BLD AUTO: 0.7 % (ref 0–1.5)
BILIRUB SERPL-MCNC: 0.3 MG/DL (ref 0–1.2)
BUN SERPL-MCNC: 18 MG/DL (ref 8–23)
BUN/CREAT SERPL: 21.4 (ref 7–25)
CALCIUM SPEC-SCNC: 9.6 MG/DL (ref 8.6–10.5)
CHLORIDE SERPL-SCNC: 103 MMOL/L (ref 98–107)
CO2 SERPL-SCNC: 23.2 MMOL/L (ref 22–29)
CREAT SERPL-MCNC: 0.84 MG/DL (ref 0.76–1.27)
DEPRECATED RDW RBC AUTO: 38.8 FL (ref 37–54)
EOSINOPHIL # BLD AUTO: 0.05 10*3/MM3 (ref 0–0.4)
EOSINOPHIL NFR BLD AUTO: 0.7 % (ref 0.3–6.2)
ERYTHROCYTE [DISTWIDTH] IN BLOOD BY AUTOMATED COUNT: 13.1 % (ref 12.3–15.4)
GFR SERPL CREATININE-BSD FRML MDRD: 91 ML/MIN/1.73
GLOBULIN UR ELPH-MCNC: 2.7 GM/DL
GLUCOSE BLDC GLUCOMTR-MCNC: 110 MG/DL (ref 70–130)
GLUCOSE BLDC GLUCOMTR-MCNC: 162 MG/DL (ref 70–130)
GLUCOSE SERPL-MCNC: 144 MG/DL (ref 65–99)
HCT VFR BLD AUTO: 48.8 % (ref 37.5–51)
HGB BLD-MCNC: 16.1 G/DL (ref 13–17.7)
IMM GRANULOCYTES # BLD AUTO: 0.02 10*3/MM3 (ref 0–0.05)
IMM GRANULOCYTES NFR BLD AUTO: 0.3 % (ref 0–0.5)
LYMPHOCYTES # BLD AUTO: 1.39 10*3/MM3 (ref 0.7–3.1)
LYMPHOCYTES NFR BLD AUTO: 19.1 % (ref 19.6–45.3)
MCH RBC QN AUTO: 27.2 PG (ref 26.6–33)
MCHC RBC AUTO-ENTMCNC: 33 G/DL (ref 31.5–35.7)
MCV RBC AUTO: 82.4 FL (ref 79–97)
MONOCYTES # BLD AUTO: 0.46 10*3/MM3 (ref 0.1–0.9)
MONOCYTES NFR BLD AUTO: 6.3 % (ref 5–12)
NEUTROPHILS NFR BLD AUTO: 5.29 10*3/MM3 (ref 1.7–7)
NEUTROPHILS NFR BLD AUTO: 72.9 % (ref 42.7–76)
NRBC BLD AUTO-RTO: 0 /100 WBC (ref 0–0.2)
PLATELET # BLD AUTO: 234 10*3/MM3 (ref 140–450)
PMV BLD AUTO: 9.5 FL (ref 6–12)
POTASSIUM SERPL-SCNC: 4.3 MMOL/L (ref 3.5–5.2)
PROT SERPL-MCNC: 6.9 G/DL (ref 6–8.5)
RBC # BLD AUTO: 5.92 10*6/MM3 (ref 4.14–5.8)
SARS-COV-2 ORF1AB RESP QL NAA+PROBE: NOT DETECTED
SODIUM SERPL-SCNC: 139 MMOL/L (ref 136–145)
TROPONIN T SERPL-MCNC: <0.01 NG/ML (ref 0–0.03)
WBC # BLD AUTO: 7.26 10*3/MM3 (ref 3.4–10.8)

## 2021-02-21 PROCEDURE — 96361 HYDRATE IV INFUSION ADD-ON: CPT

## 2021-02-21 PROCEDURE — 93005 ELECTROCARDIOGRAM TRACING: CPT | Performed by: EMERGENCY MEDICINE

## 2021-02-21 PROCEDURE — 85025 COMPLETE CBC W/AUTO DIFF WBC: CPT | Performed by: EMERGENCY MEDICINE

## 2021-02-21 PROCEDURE — G0378 HOSPITAL OBSERVATION PER HR: HCPCS

## 2021-02-21 PROCEDURE — 70450 CT HEAD/BRAIN W/O DYE: CPT

## 2021-02-21 PROCEDURE — 82962 GLUCOSE BLOOD TEST: CPT

## 2021-02-21 PROCEDURE — 25010000002 ONDANSETRON PER 1 MG: Performed by: EMERGENCY MEDICINE

## 2021-02-21 PROCEDURE — 80053 COMPREHEN METABOLIC PANEL: CPT | Performed by: EMERGENCY MEDICINE

## 2021-02-21 PROCEDURE — U0004 COV-19 TEST NON-CDC HGH THRU: HCPCS | Performed by: EMERGENCY MEDICINE

## 2021-02-21 PROCEDURE — 84484 ASSAY OF TROPONIN QUANT: CPT | Performed by: EMERGENCY MEDICINE

## 2021-02-21 PROCEDURE — 96374 THER/PROPH/DIAG INJ IV PUSH: CPT

## 2021-02-21 PROCEDURE — 99284 EMERGENCY DEPT VISIT MOD MDM: CPT

## 2021-02-21 PROCEDURE — C9803 HOPD COVID-19 SPEC COLLECT: HCPCS

## 2021-02-21 PROCEDURE — U0005 INFEC AGEN DETEC AMPLI PROBE: HCPCS | Performed by: EMERGENCY MEDICINE

## 2021-02-21 RX ORDER — ASPIRIN 300 MG/1
300 SUPPOSITORY RECTAL DAILY
Status: DISCONTINUED | OUTPATIENT
Start: 2021-02-21 | End: 2021-02-22 | Stop reason: HOSPADM

## 2021-02-21 RX ORDER — ONDANSETRON 2 MG/ML
4 INJECTION INTRAMUSCULAR; INTRAVENOUS EVERY 6 HOURS PRN
Status: DISCONTINUED | OUTPATIENT
Start: 2021-02-21 | End: 2021-02-22 | Stop reason: HOSPADM

## 2021-02-21 RX ORDER — SODIUM CHLORIDE 0.9 % (FLUSH) 0.9 %
10 SYRINGE (ML) INJECTION AS NEEDED
Status: DISCONTINUED | OUTPATIENT
Start: 2021-02-21 | End: 2021-02-22 | Stop reason: HOSPADM

## 2021-02-21 RX ORDER — TESTOSTERONE 20.25 MG/1.25G
2 GEL TOPICAL 2 TIMES DAILY
COMMUNITY
End: 2021-02-25

## 2021-02-21 RX ORDER — LANCETS
EACH MISCELLANEOUS
COMMUNITY
Start: 2020-12-07

## 2021-02-21 RX ORDER — ACETAMINOPHEN 325 MG/1
650 TABLET ORAL EVERY 4 HOURS PRN
Status: DISCONTINUED | OUTPATIENT
Start: 2021-02-21 | End: 2021-02-22 | Stop reason: HOSPADM

## 2021-02-21 RX ORDER — MECLIZINE HYDROCHLORIDE 25 MG/1
50 TABLET ORAL ONCE
Status: COMPLETED | OUTPATIENT
Start: 2021-02-21 | End: 2021-02-21

## 2021-02-21 RX ORDER — ATORVASTATIN CALCIUM 80 MG/1
80 TABLET, FILM COATED ORAL NIGHTLY
Status: DISCONTINUED | OUTPATIENT
Start: 2021-02-21 | End: 2021-02-22 | Stop reason: HOSPADM

## 2021-02-21 RX ORDER — ERGOCALCIFEROL 1.25 MG/1
50000 CAPSULE ORAL
COMMUNITY
End: 2021-04-26

## 2021-02-21 RX ORDER — BLOOD SUGAR DIAGNOSTIC
1 STRIP MISCELLANEOUS 3 TIMES DAILY
COMMUNITY
Start: 2020-12-07 | End: 2021-03-07

## 2021-02-21 RX ORDER — ACETAMINOPHEN 650 MG/1
650 SUPPOSITORY RECTAL EVERY 4 HOURS PRN
Status: DISCONTINUED | OUTPATIENT
Start: 2021-02-21 | End: 2021-02-22 | Stop reason: HOSPADM

## 2021-02-21 RX ORDER — BISACODYL 10 MG
10 SUPPOSITORY, RECTAL RECTAL DAILY PRN
Status: DISCONTINUED | OUTPATIENT
Start: 2021-02-21 | End: 2021-02-22 | Stop reason: HOSPADM

## 2021-02-21 RX ORDER — ONDANSETRON 2 MG/ML
4 INJECTION INTRAMUSCULAR; INTRAVENOUS ONCE
Status: COMPLETED | OUTPATIENT
Start: 2021-02-21 | End: 2021-02-21

## 2021-02-21 RX ORDER — SODIUM CHLORIDE 9 MG/ML
75 INJECTION, SOLUTION INTRAVENOUS CONTINUOUS
Status: DISCONTINUED | OUTPATIENT
Start: 2021-02-21 | End: 2021-02-22 | Stop reason: HOSPADM

## 2021-02-21 RX ORDER — ASPIRIN 325 MG
325 TABLET ORAL DAILY
Status: DISCONTINUED | OUTPATIENT
Start: 2021-02-21 | End: 2021-02-22 | Stop reason: HOSPADM

## 2021-02-21 RX ADMIN — SODIUM CHLORIDE 75 ML/HR: 9 INJECTION, SOLUTION INTRAVENOUS at 18:40

## 2021-02-21 RX ADMIN — MECLIZINE HYDROCHLORIDE 50 MG: 25 TABLET ORAL at 12:56

## 2021-02-21 RX ADMIN — SODIUM CHLORIDE 500 ML: 9 INJECTION, SOLUTION INTRAVENOUS at 12:41

## 2021-02-21 RX ADMIN — ATORVASTATIN CALCIUM 80 MG: 80 TABLET, FILM COATED ORAL at 21:36

## 2021-02-21 RX ADMIN — ASPIRIN 325 MG: 325 TABLET ORAL at 18:39

## 2021-02-21 RX ADMIN — ONDANSETRON 4 MG: 2 INJECTION INTRAMUSCULAR; INTRAVENOUS at 12:56

## 2021-02-21 NOTE — ED NOTES
"\"  Nursing report ED to floor  Brian Garcia  70 y.o.  male    HPI (triage note):   Chief Complaint   Patient presents with   • Dizziness       Admitting doctor:   Marybeth Drummond MD    Admitting diagnosis:   The primary encounter diagnosis was Dizziness. A diagnosis of Ataxia was also pertinent to this visit.    Code status:   Current Code Status     Date Active Code Status Order ID Comments User Context       Not on file    Advance Care Planning Activity          Allergies:   Codeine, Doxycycline, Invokana [canagliflozin], Metformin and related, Penicillins, Sulfa antibiotics, Terazosin, and Tramadol    Weight:       02/21/21  1220   Weight: 74.8 kg (165 lb)       Most recent vitals:   Vitals:    02/21/21 1220 02/21/21 1300 02/21/21 1305   BP: 132/77  138/85   Pulse: 82  84   Resp: 16  18   Temp:  97.7 °F (36.5 °C)    SpO2: 98%  98%   Weight: 74.8 kg (165 lb)     Height: 162.6 cm (64\")         Active LDAs/IV Access:   Lines, Drains & Airways    Active LDAs     Name:   Placement date:   Placement time:   Site:   Days:    Peripheral IV 02/21/21 Anterior;Right Wrist   02/21/21    --    Wrist   less than 1    Urethral Catheter Latex 16 Fr.   02/04/20    1012     383                Labs (abnormal labs have a star):   Labs Reviewed   COMPREHENSIVE METABOLIC PANEL - Abnormal; Notable for the following components:       Result Value    Glucose 144 (*)     Alkaline Phosphatase 24 (*)     All other components within normal limits    Narrative:     GFR Normal >60  Chronic Kidney Disease <60  Kidney Failure <15     CBC WITH AUTO DIFFERENTIAL - Abnormal; Notable for the following components:    RBC 5.92 (*)     Lymphocyte % 19.1 (*)     All other components within normal limits   TROPONIN (IN-HOUSE) - Normal    Narrative:     Troponin T Reference Range:  <= 0.03 ng/mL-   Negative for AMI  >0.03 ng/mL-     Abnormal for myocardial necrosis.  Clinicians would have to utilize clinical acumen, EKG, Troponin and serial changes " to determine if it is an Acute Myocardial Infarction or myocardial injury due to an underlying chronic condition.       Results may be falsely decreased if patient taking Biotin.     COVID PRE-OP / PRE-PROCEDURE SCREENING ORDER (NO ISOLATION)    Narrative:     The following orders were created for panel order COVID PRE-OP / PRE-PROCEDURE SCREENING ORDER (NO ISOLATION) - Swab, Nasopharynx.  Procedure                               Abnormality         Status                     ---------                               -----------         ------                     COVID-19,APTIMA PANTHER,...[[id number]]                      In process                   Please view results for these tests on the individual orders.   COVID-19,APTIMA PANTHER,GIFTY IN-HOUSE,NP/OP SWAB IN UTM/VTM/SALINE TRANSPORT MEDIA,24 HR TAT   CBC AND DIFFERENTIAL    Narrative:     The following orders were created for panel order CBC & Differential.  Procedure                               Abnormality         Status                     ---------                               -----------         ------                     CBC Auto Differential[[id number]]        Abnormal            Final result                 Please view results for these tests on the individual orders.       EKG:   ECG 12 Lead    (Results Pending)       Meds given in ED:   Medications   sodium chloride 0.9 % flush 10 mL (has no administration in time range)   meclizine (ANTIVERT) tablet 50 mg (50 mg Oral Given 2/21/21 1256)   sodium chloride 0.9 % bolus 500 mL (0 mL Intravenous Stopped 2/21/21 1304)   ondansetron (ZOFRAN) injection 4 mg (4 mg Intravenous Given 2/21/21 1256)       Imaging results:  Ct Head Without Contrast    Result Date: 2/21/2021   Mild changes of chronic small vessel ischemic phenomena are identified. There is a subcentimeter hypodensity within the right cerebellar hemisphere which could represent an enlarged perivascular space or a tiny age-indeterminate right PICA  distribution infarct. Otherwise, there is no convincing evidence to suggest acute renal pathology on the CT. Further evaluation could be performed with with MR imaging as clinically indicated.  Findings were discussed with Dr. Castro on 02/21/2021 at approximately 1:47 PM.  Radiation dose reduction techniques were utilized, including automated exposure control and exposure modulation based on body size.  This report was finalized on 2/21/2021 2:37 PM by Dr. Cosme Carroll M.D.        Ambulatory status:   - x1 assist    Social issues:   Social History     Socioeconomic History   • Marital status:      Spouse name: Not on file   • Number of children: Not on file   • Years of education: Not on file   • Highest education level: Not on file   Tobacco Use   • Smoking status: Never Smoker   • Smokeless tobacco: Never Used   Substance and Sexual Activity   • Alcohol use: No   • Drug use: No    Nursing report ED to floor       Aparna Rios RN  02/21/21 5225

## 2021-02-21 NOTE — PROGRESS NOTES
"Clinical Pharmacy Services: Medication History    Brian Garcia is a 70 y.o. male presenting to [facility] for Dizziness [R42]  Ataxia [R27.0]    He  has a past medical history of Bladder tumor, Diabetes mellitus (CMS/[facility]), History of asthma, History of cardiac murmur as a child, History of skin cancer, Hyperlipidemia, and Type 2 diabetes mellitus (CMS/[facility]).    Allergies as of 02/21/2021 - Reviewed 02/21/2021   Allergen Reaction Noted   • Codeine  02/12/2016   • Doxycycline Hives and Itching 05/21/2020   • Invokana [canagliflozin]  12/21/2017   • Metformin and related GI Intolerance 04/13/2018   • Penicillins  02/12/2016   • Sulfa antibiotics  02/12/2016   • Terazosin Dizziness 10/26/2020   • Tramadol  02/15/2016       Medication information was obtained from: Patient  Pharmacy and Phone Number: [facility] [place], [place] - [address] [facility] [phone] [facility] [phone] FX        Prior to Admission Medications     Prescriptions Last Dose Informant Patient Reported? Taking?    B Complex-C-E-Zn (b complex-C-E-zinc) tablet 2/20/2021 Self Yes Yes    Take 1 tablet by mouth Daily.    Empagliflozin 25 MG tablet 2/20/2021 Self Yes Yes    Take 25 mg by mouth Every Morning.    Multiple Vitamins-Minerals (MULTIVITAL-M PO) 2/20/2021 Self Yes Yes    Take 1 tablet by mouth Daily.    Needle, Disp, 22G X 1\" misc  Self No Yes    1 inj weekly    Semaglutide,0.25 or 0.5MG/DOS, (Ozempic, 0.25 or 0.5 MG/DOSE,) 2 MG/1.5ML solution pen-injector Past Week Self Yes Yes    Inject 0.5 mg under the skin into the appropriate area as directed 1 (One) Time Per Week. On Tuesday    Syringe 18G X 1-1/2\" 3 ML misc  Self No Yes    1 inj every week    Testosterone 1.62 % gel 2/20/2021 Self Yes Yes    Place 2 application on the skin as directed by provider 2 (two) times a day. Two pumps to each shoulder daily    vitamin D (ERGOCALCIFEROL) 1.25 MG (77579 UT) capsule capsule Past Week Self Yes Yes    Take 50,000 Units by mouth " Every 7 (Seven) Days. Takes on Thursdays            Medication notes:     This medication list is complete to the best of my knowledge as of 2/21/2021    Please call if questions.    Kaleb Luong [facility]    2/21/2021 17:30 EST

## 2021-02-21 NOTE — ED NOTES
Pt to ER by EMS from home c/o dizziness started 1500 yesterday, no hx of vertigo. Pt had vomited, no longer nausea. No diarrhea.    This RN is wearing mask, gloves and goggles at all times during all patient interactions.     Mahesh Elaine, RN  02/21/21 2823

## 2021-02-21 NOTE — ED PROVIDER NOTES
EMERGENCY DEPARTMENT ENCOUNTER    Room Number:  33/33  Date of encounter:  2/21/2021  PCP: Andrey Wolfe MD  Historian: Patient      HPI:  Chief Complaint: Dizziness      Context: Brian Garcia is a 70 y.o. male who presents to the ED c/o dizziness and off-balance.  The patient states he was laying on the ground yesterday and when he got up he had the acute onset of dizziness, motion sickness and off balance.  He states he rested for a few minutes no symptoms resolved and he felt fine last night but when he woke up this morning he had the same symptoms again.  Today his dizziness became so severe that you became nauseated.  Now he feels better but states if he turns his head side to side, closes eyes or lifts his head up off the bed he becomes dizzy again.      PAST MEDICAL HISTORY  Active Ambulatory Problems     Diagnosis Date Noted   • Acute bronchitis 02/15/2016   • Acute pharyngitis 02/15/2016   • Acute upper respiratory infection 02/15/2016   • Allergic rhinitis 02/15/2016   • Carpal tunnel syndrome 02/15/2016   • Cough 02/15/2016   • Non-insulin dependent type 2 diabetes mellitus (CMS/HCC) 02/15/2016   • Alimentary obesity 02/15/2016   • Fatigue 02/15/2016   • Effusion of knee 02/15/2016   • ED (erectile dysfunction) of organic origin 02/15/2016   • Muscle ache 02/15/2016   • Thumb pain 02/15/2016   • Welcome to Medicare preventive visit 04/17/2017   • Mixed hyperlipidemia 12/21/2017   • Vitamin D deficiency 12/21/2017   • Refusal of statin medication by patient 04/19/2018   • Medication management 07/18/2018   • Male hypogonadism 07/18/2018   • Low testosterone 02/27/2019   • Benign prostatic hyperplasia without lower urinary tract symptoms 02/27/2019   • Hives 05/21/2020     Resolved Ambulatory Problems     Diagnosis Date Noted   • HLD (hyperlipidemia) 02/15/2016   • BP (high blood pressure) 02/15/2016   • Gonalgia 02/15/2016   • Cellulitis of right lower extremity 06/05/2017   • Bee sting allergy  06/05/2017   • Uncontrolled type 2 diabetes mellitus with complication, without long-term current use of insulin (CMS/HCC) 12/21/2017     Past Medical History:   Diagnosis Date   • Bladder tumor    • Diabetes mellitus (CMS/HCC)    • History of asthma    • History of cardiac murmur as a child    • History of skin cancer    • Hyperlipidemia    • Type 2 diabetes mellitus (CMS/HCC)          PAST SURGICAL HISTORY  Past Surgical History:   Procedure Laterality Date   • ADENOIDECTOMY     • APPENDECTOMY     • CATARACT EXTRACTION, BILATERAL     • CERVICAL SPINE SURGERY     • LASIK Bilateral    • TONSILLECTOMY     • TRANSURETHRAL RESECTION OF BLADDER TUMOR N/A 2/4/2020    Procedure: TRANSURETHRAL RESECTION OF BLADDER TUMOR;  Surgeon: Leandro Rodas MD;  Location: [facility] OR;  Service: Urology;  Laterality: N/A;         FAMILY HISTORY  Family History   Problem Relation Age of Onset   • Myelodysplastic syndrome Mother    • Other Father         Cardiovascular disorder   • Diabetes Other    • Hyperlipidemia Other    • Hypertension Other    • Malig Hyperthermia Neg Hx          SOCIAL HISTORY  Social History     Socioeconomic History   • Marital status:      Spouse name: Not on file   • Number of children: Not on file   • Years of education: Not on file   • Highest education level: Not on file   Tobacco Use   • Smoking status: Never Smoker   • Smokeless tobacco: Never Used   Substance and Sexual Activity   • Alcohol use: No   • Drug use: No         ALLERGIES  Codeine, Doxycycline, Invokana [canagliflozin], Metformin and related, Penicillins, Sulfa antibiotics, Terazosin, and Tramadol        REVIEW OF SYSTEMS  Review of Systems     Patient denies headache, neck pain, change in vision, trouble swallowing, numbness, tingling, focal neuro deficit, chest pain, shortness of breath, fevers, chills, known COVID-19 exposure, abdominal pain, diarrhea or rash    All systems reviewed and negative except for those discussed  in HPI.     PHYSICAL EXAM    I have reviewed the triage vital signs and nursing notes.    ED Triage Vitals   Temp Pulse Resp BP SpO2   -- -- -- -- --      Temp src Heart Rate Source Patient Position BP Location FiO2 (%)   -- -- -- -- --       GENERAL: 70-year-old well developed, well nourished in no acute distress  HENT: NCAT, neck supple, trachea midline  EYES: no scleral icterus, PERRL, normal conjunctiva  CV: regular rhythm, regular rate, no murmur  RESPIRATORY: unlabored effort, CTAB  ABDOMEN: soft, non-tender, non-distended, bowel sounds present  MUSCULOSKELETAL: no gross deformity, no pedal edema, no calf tenderness  NEURO: alert,  sensory and motor function of extremities grossly intact, speech clear, mental status normal: The patient's finger-to-nose and heel-to-shin are normal.  The patient has nystagmus worse on the right with reproduction of his symptoms when looking to the right.  SKIN: warm, dry, no rash  PSYCH:  Appropriate mood and affect      PPE  Pt does not present with symptoms for COVID19; however, I was wearing a mask and goggles throughout all patient interaction.    Vital signs and nursing notes reviewed.      LAB RESULTS  Recent Results (from the past 24 hour(s))   Comprehensive Metabolic Panel    Collection Time: 02/21/21 12:41 PM    Specimen: Blood   Result Value Ref Range    Glucose 144 (H) 65 - 99 mg/dL    BUN 18 8 - 23 mg/dL    Creatinine 0.84 0.76 - 1.27 mg/dL    Sodium 139 136 - 145 mmol/L    Potassium 4.3 3.5 - 5.2 mmol/L    Chloride 103 98 - 107 mmol/L    CO2 23.2 22.0 - 29.0 mmol/L    Calcium 9.6 8.6 - 10.5 mg/dL    Total Protein 6.9 6.0 - 8.5 g/dL    Albumin 4.20 3.50 - 5.20 g/dL    ALT (SGPT) 19 1 - 41 U/L    AST (SGOT) 20 1 - 40 U/L    Alkaline Phosphatase 24 (L) 39 - 117 U/L    Total Bilirubin 0.3 0.0 - 1.2 mg/dL    eGFR Non African Amer 91 >60 mL/min/1.73    Globulin 2.7 gm/dL    A/G Ratio 1.6 g/dL    BUN/Creatinine Ratio 21.4 7.0 - 25.0    Anion Gap 12.8 5.0 - 15.0 mmol/L    Troponin    Collection Time: 02/21/21 12:41 PM    Specimen: Blood   Result Value Ref Range    Troponin T <0.010 0.000 - 0.030 ng/mL   CBC Auto Differential    Collection Time: 02/21/21 12:41 PM    Specimen: Blood   Result Value Ref Range    WBC 7.26 3.40 - 10.80 10*3/mm3    RBC 5.92 (H) 4.14 - 5.80 10*6/mm3    Hemoglobin 16.1 13.0 - 17.7 g/dL    Hematocrit 48.8 37.5 - 51.0 %    MCV 82.4 79.0 - 97.0 fL    MCH 27.2 26.6 - 33.0 pg    MCHC 33.0 31.5 - 35.7 g/dL    RDW 13.1 12.3 - 15.4 %    RDW-SD 38.8 37.0 - 54.0 fl    MPV 9.5 6.0 - 12.0 fL    Platelets 234 140 - 450 10*3/mm3    Neutrophil % 72.9 42.7 - 76.0 %    Lymphocyte % 19.1 (L) 19.6 - 45.3 %    Monocyte % 6.3 5.0 - 12.0 %    Eosinophil % 0.7 0.3 - 6.2 %    Basophil % 0.7 0.0 - 1.5 %    Immature Grans % 0.3 0.0 - 0.5 %    Neutrophils, Absolute 5.29 1.70 - 7.00 10*3/mm3    Lymphocytes, Absolute 1.39 0.70 - 3.10 10*3/mm3    Monocytes, Absolute 0.46 0.10 - 0.90 10*3/mm3    Eosinophils, Absolute 0.05 0.00 - 0.40 10*3/mm3    Basophils, Absolute 0.05 0.00 - 0.20 10*3/mm3    Immature Grans, Absolute 0.02 0.00 - 0.05 10*3/mm3    nRBC 0.0 0.0 - 0.2 /100 WBC       Ordered the above labs and independently reviewed the results.        RADIOLOGY  Ct Head Without Contrast    Result Date: 2/21/2021  CT HEAD WITHOUT CONTRAST  CLINICAL HISTORY: Dizziness.  CT scan of the head was obtained with 3 mm axial images. No intravenous contrast was administered.  FINDINGS:  The ventricles, sulci, and cisterns are age appropriate. The basal ganglia and thalami are unremarkable. The gray-white matter differentiation is within normal limits. There are relatively mild changes of chronic small vessel ischemic phenomena. There is a 7 x 2 mm hypodensity within the right cerebellar hemisphere which could represent enlarged perivascular space or an age-indeterminate PICA distribution infarct.       Mild changes of chronic small vessel ischemic phenomena are identified. There is a subcentimeter  hypodensity within the right cerebellar hemisphere which could represent an enlarged perivascular space or a tiny age-indeterminate right PICA distribution infarct. Otherwise, there is no convincing evidence to suggest acute renal pathology on the CT. Further evaluation could be performed with with MR imaging as clinically indicated.  Findings were discussed with Dr. Castro on 02/21/2021 at approximately 1:47 PM.  Radiation dose reduction techniques were utilized, including automated exposure control and exposure modulation based on body size.  This report was finalized on 2/21/2021 2:37 PM by Dr. Cosme Carroll M.D.        I ordered the above noted radiological studies. Independently reviewed by me and discussed with radiologist.  See dictation above for official radiology interpretation.      PROCEDURES    Procedures        MEDICATIONS GIVEN IN ER    Medications   sodium chloride 0.9 % flush 10 mL (has no administration in time range)   meclizine (ANTIVERT) tablet 50 mg (50 mg Oral Given 2/21/21 1256)   sodium chloride 0.9 % bolus 500 mL (0 mL Intravenous Stopped 2/21/21 1304)   ondansetron (ZOFRAN) injection 4 mg (4 mg Intravenous Given 2/21/21 1256)         PROGRESS, DATA ANALYSIS, CONSULTS, AND MEDICAL DECISION MAKING    All labs have been independently reviewed by me.  All radiology studies have been reviewed by me and discussed with radiologist dictating report.   EKG's independently reviewed by me.  Discussion below represents my analysis of pertinent findings related to patient's condition, differential diagnosis, treatment plan and final disposition.      ED Course as of Feb 21 1615   Sun Feb 21, 2021   1259 EKG    EKG time: 1249  Rhythm/Rate: Normal sinus rhythm at 81 with a PVC  No Acute Ischemia  Non-Specific ST-T changes    Unchanged compared to prior on 1/27/2020    Interpreted Contemporaneously by me.  Independently viewed by me        [GP]   5624 I discussed the patient's CT scan with Dr. Carroll who  states the patient has a hypodense lesion in his right cerebellum unknown chronicity.    [GP]   1435 After the meclizine the patient is sleeping but easily arousable and states that he feels better.  We will ambulate the patient.    [GP]   1503 We ambulated the patient and he still seems to be a bit off balance and states he does not feel normal.    [GP]   1503 I will admit the patient to the hospital for further work-up of his dizziness and ataxia due to my concern for a posterior circulation infarct.  The patient was not a team D or TPA candidate since his symptoms started yesterday.    [GP]   1514 I discussed the case with Dr. Dolan from [facility].  I advised of the patient's history, physical and abnormal head CT and admission for further work-up and likely MRI of the brain.    [GP]      ED Course User Index  [GP] Adalberto Castro MD           The differential diagnosis includes but is not limited to stroke, intra cerebral hemorrhage, vertigo, vestibular neuritis, or vertebrobasilar insufficiency        AS OF 16:15 EST VITALS:    BP - 121/84  HR - 98  TEMP - 97.7 °F (36.5 °C)  02 SATS - 95%        DIAGNOSIS  Final diagnoses:   Dizziness   Ataxia         DISPOSITION  ADMISSION    Discussed treatment plan and reason for admission with pt/family and admitting physician.  Pt/family voiced understanding of the plan for admission for further testing/treatment as needed.        EMR Dragon/Transcription disclaimer:   Much of this encounter note is an electronic transcription/translation of spoken language to printed text.        Adalberto Castro MD  02/21/21 2509

## 2021-02-22 ENCOUNTER — APPOINTMENT (OUTPATIENT)
Dept: CT IMAGING | Facility: HOSPITAL | Age: 71
End: 2021-02-22

## 2021-02-22 ENCOUNTER — APPOINTMENT (OUTPATIENT)
Dept: MRI IMAGING | Facility: HOSPITAL | Age: 71
End: 2021-02-22

## 2021-02-22 ENCOUNTER — APPOINTMENT (OUTPATIENT)
Dept: CARDIOLOGY | Facility: HOSPITAL | Age: 71
End: 2021-02-22

## 2021-02-22 ENCOUNTER — READMISSION MANAGEMENT (OUTPATIENT)
Dept: CALL CENTER | Facility: HOSPITAL | Age: 71
End: 2021-02-22

## 2021-02-22 VITALS
HEIGHT: 64 IN | OXYGEN SATURATION: 97 % | HEART RATE: 83 BPM | RESPIRATION RATE: 16 BRPM | TEMPERATURE: 98 F | BODY MASS INDEX: 28.17 KG/M2 | WEIGHT: 165 LBS | SYSTOLIC BLOOD PRESSURE: 129 MMHG | DIASTOLIC BLOOD PRESSURE: 86 MMHG

## 2021-02-22 PROBLEM — I51.9 IMPAIRED LEFT VENTRICULAR RELAXATION: Status: ACTIVE | Noted: 2021-02-22

## 2021-02-22 PROBLEM — R47.81 SLURRED SPEECH: Status: ACTIVE | Noted: 2021-02-22

## 2021-02-22 LAB
ALBUMIN SERPL-MCNC: 3.2 G/DL (ref 3.5–5.2)
ALBUMIN/GLOB SERPL: 1.5 G/DL
ALP SERPL-CCNC: 18 U/L (ref 39–117)
ALT SERPL W P-5'-P-CCNC: 16 U/L (ref 1–41)
ANION GAP SERPL CALCULATED.3IONS-SCNC: 8.9 MMOL/L (ref 5–15)
AORTIC ARCH: 1.5 CM
AST SERPL-CCNC: 12 U/L (ref 1–40)
BH CV ECHO MEAS - ACS: 1.7 CM
BH CV ECHO MEAS - AO ARCH DIAM (PROXIMAL TRANS.): 1.5 CM
BH CV ECHO MEAS - AO MAX PG (FULL): 1.5 MMHG
BH CV ECHO MEAS - AO MAX PG: 3.9 MMHG
BH CV ECHO MEAS - AO MEAN PG (FULL): 1 MMHG
BH CV ECHO MEAS - AO MEAN PG: 2 MMHG
BH CV ECHO MEAS - AO ROOT AREA (BSA CORRECTED): 1.6
BH CV ECHO MEAS - AO ROOT AREA: 6.6 CM^2
BH CV ECHO MEAS - AO ROOT DIAM: 2.9 CM
BH CV ECHO MEAS - AO V2 MAX: 98.3 CM/SEC
BH CV ECHO MEAS - AO V2 MEAN: 68.5 CM/SEC
BH CV ECHO MEAS - AO V2 VTI: 20.5 CM
BH CV ECHO MEAS - AVA(I,A): 2.9 CM^2
BH CV ECHO MEAS - AVA(I,D): 2.9 CM^2
BH CV ECHO MEAS - AVA(V,A): 2.5 CM^2
BH CV ECHO MEAS - AVA(V,D): 2.5 CM^2
BH CV ECHO MEAS - BSA(HAYCOCK): 1.9 M^2
BH CV ECHO MEAS - BSA: 1.8 M^2
BH CV ECHO MEAS - BZI_BMI: 28.3 KILOGRAMS/M^2
BH CV ECHO MEAS - BZI_METRIC_HEIGHT: 162.6 CM
BH CV ECHO MEAS - BZI_METRIC_WEIGHT: 74.8 KG
BH CV ECHO MEAS - EDV(CUBED): 91.1 ML
BH CV ECHO MEAS - EDV(MOD-SP2): 50 ML
BH CV ECHO MEAS - EDV(MOD-SP4): 62 ML
BH CV ECHO MEAS - EDV(TEICH): 92.4 ML
BH CV ECHO MEAS - EF(CUBED): 67.3 %
BH CV ECHO MEAS - EF(MOD-BP): 55.4 %
BH CV ECHO MEAS - EF(MOD-SP2): 56 %
BH CV ECHO MEAS - EF(MOD-SP4): 54.8 %
BH CV ECHO MEAS - EF(TEICH): 59 %
BH CV ECHO MEAS - ESV(CUBED): 29.8 ML
BH CV ECHO MEAS - ESV(MOD-SP2): 22 ML
BH CV ECHO MEAS - ESV(MOD-SP4): 28 ML
BH CV ECHO MEAS - ESV(TEICH): 37.9 ML
BH CV ECHO MEAS - FS: 31.1 %
BH CV ECHO MEAS - IVS/LVPW: 1
BH CV ECHO MEAS - IVSD: 1.2 CM
BH CV ECHO MEAS - LAT PEAK E' VEL: 8.4 CM/SEC
BH CV ECHO MEAS - LV DIASTOLIC VOL/BSA (35-75): 34.4 ML/M^2
BH CV ECHO MEAS - LV MASS(C)D: 198.1 GRAMS
BH CV ECHO MEAS - LV MASS(C)DI: 109.9 GRAMS/M^2
BH CV ECHO MEAS - LV MAX PG: 2.4 MMHG
BH CV ECHO MEAS - LV MEAN PG: 1 MMHG
BH CV ECHO MEAS - LV SYSTOLIC VOL/BSA (12-30): 15.5 ML/M^2
BH CV ECHO MEAS - LV V1 MAX: 77.7 CM/SEC
BH CV ECHO MEAS - LV V1 MEAN: 58.5 CM/SEC
BH CV ECHO MEAS - LV V1 VTI: 19.1 CM
BH CV ECHO MEAS - LVIDD: 4.5 CM
BH CV ECHO MEAS - LVIDS: 3.1 CM
BH CV ECHO MEAS - LVLD AP2: 6.2 CM
BH CV ECHO MEAS - LVLD AP4: 6.3 CM
BH CV ECHO MEAS - LVLS AP2: 5.6 CM
BH CV ECHO MEAS - LVLS AP4: 5.6 CM
BH CV ECHO MEAS - LVOT AREA (M): 3.1 CM^2
BH CV ECHO MEAS - LVOT AREA: 3.1 CM^2
BH CV ECHO MEAS - LVOT DIAM: 2 CM
BH CV ECHO MEAS - LVPWD: 1.2 CM
BH CV ECHO MEAS - MED PEAK E' VEL: 7.8 CM/SEC
BH CV ECHO MEAS - MV A DUR: 0.15 SEC
BH CV ECHO MEAS - MV A MAX VEL: 76.3 CM/SEC
BH CV ECHO MEAS - MV DEC SLOPE: 317 CM/SEC^2
BH CV ECHO MEAS - MV DEC TIME: 0.2 SEC
BH CV ECHO MEAS - MV E MAX VEL: 57 CM/SEC
BH CV ECHO MEAS - MV E/A: 0.75
BH CV ECHO MEAS - MV MAX PG: 2.4 MMHG
BH CV ECHO MEAS - MV MEAN PG: 2 MMHG
BH CV ECHO MEAS - MV P1/2T MAX VEL: 70.6 CM/SEC
BH CV ECHO MEAS - MV P1/2T: 65.2 MSEC
BH CV ECHO MEAS - MV V2 MAX: 77.6 CM/SEC
BH CV ECHO MEAS - MV V2 MEAN: 59.6 CM/SEC
BH CV ECHO MEAS - MV V2 VTI: 14.9 CM
BH CV ECHO MEAS - MVA P1/2T LCG: 3.1 CM^2
BH CV ECHO MEAS - MVA(P1/2T): 3.4 CM^2
BH CV ECHO MEAS - MVA(VTI): 4 CM^2
BH CV ECHO MEAS - PA ACC TIME: 0.16 SEC
BH CV ECHO MEAS - PA MAX PG (FULL): 0.58 MMHG
BH CV ECHO MEAS - PA MAX PG: 1.5 MMHG
BH CV ECHO MEAS - PA PR(ACCEL): 6.6 MMHG
BH CV ECHO MEAS - PA V2 MAX: 62.1 CM/SEC
BH CV ECHO MEAS - PULM A REVS DUR: 0.11 SEC
BH CV ECHO MEAS - PULM A REVS VEL: 26.6 CM/SEC
BH CV ECHO MEAS - PULM DIAS VEL: 30.8 CM/SEC
BH CV ECHO MEAS - PULM S/D: 1.7
BH CV ECHO MEAS - PULM SYS VEL: 53.1 CM/SEC
BH CV ECHO MEAS - PVA(V,A): 2.7 CM^2
BH CV ECHO MEAS - PVA(V,D): 2.7 CM^2
BH CV ECHO MEAS - QP/QS: 0.65
BH CV ECHO MEAS - RV MAX PG: 0.96 MMHG
BH CV ECHO MEAS - RV MEAN PG: 1 MMHG
BH CV ECHO MEAS - RV V1 MAX: 49 CM/SEC
BH CV ECHO MEAS - RV V1 MEAN: 34.3 CM/SEC
BH CV ECHO MEAS - RV V1 VTI: 11.2 CM
BH CV ECHO MEAS - RVOT AREA: 3.5 CM^2
BH CV ECHO MEAS - RVOT DIAM: 2.1 CM
BH CV ECHO MEAS - SI(AO): 75.1 ML/M^2
BH CV ECHO MEAS - SI(CUBED): 34 ML/M^2
BH CV ECHO MEAS - SI(LVOT): 33.3 ML/M^2
BH CV ECHO MEAS - SI(MOD-SP2): 15.5 ML/M^2
BH CV ECHO MEAS - SI(MOD-SP4): 18.9 ML/M^2
BH CV ECHO MEAS - SI(TEICH): 30.3 ML/M^2
BH CV ECHO MEAS - SUP REN AO DIAM: 1.8 CM
BH CV ECHO MEAS - SV(AO): 135.4 ML
BH CV ECHO MEAS - SV(CUBED): 61.3 ML
BH CV ECHO MEAS - SV(LVOT): 60 ML
BH CV ECHO MEAS - SV(MOD-SP2): 28 ML
BH CV ECHO MEAS - SV(MOD-SP4): 34 ML
BH CV ECHO MEAS - SV(RVOT): 38.8 ML
BH CV ECHO MEAS - SV(TEICH): 54.5 ML
BH CV ECHO MEAS - TAPSE (>1.6): 2.2 CM
BH CV ECHO MEASUREMENTS AVERAGE E/E' RATIO: 7.04
BH CV XLRA - RV BASE: 2.8 CM
BH CV XLRA - RV LENGTH: 6.4 CM
BH CV XLRA - RV MID: 2.4 CM
BH CV XLRA - TDI S': 10.9 CM/SEC
BILIRUB SERPL-MCNC: 0.5 MG/DL (ref 0–1.2)
BUN SERPL-MCNC: 16 MG/DL (ref 8–23)
BUN/CREAT SERPL: 17.2 (ref 7–25)
CALCIUM SPEC-SCNC: 8 MG/DL (ref 8.6–10.5)
CHLORIDE SERPL-SCNC: 109 MMOL/L (ref 98–107)
CHOLEST SERPL-MCNC: 127 MG/DL (ref 0–200)
CO2 SERPL-SCNC: 23.1 MMOL/L (ref 22–29)
CREAT SERPL-MCNC: 0.93 MG/DL (ref 0.76–1.27)
DEPRECATED RDW RBC AUTO: 40.2 FL (ref 37–54)
ERYTHROCYTE [DISTWIDTH] IN BLOOD BY AUTOMATED COUNT: 13.1 % (ref 12.3–15.4)
GFR SERPL CREATININE-BSD FRML MDRD: 81 ML/MIN/1.73
GLOBULIN UR ELPH-MCNC: 2.2 GM/DL
GLUCOSE BLDC GLUCOMTR-MCNC: 130 MG/DL (ref 70–130)
GLUCOSE BLDC GLUCOMTR-MCNC: 89 MG/DL (ref 70–130)
GLUCOSE SERPL-MCNC: 81 MG/DL (ref 65–99)
HBA1C MFR BLD: 6.98 % (ref 4.8–5.6)
HCT VFR BLD AUTO: 46.3 % (ref 37.5–51)
HDLC SERPL-MCNC: 34 MG/DL (ref 40–60)
HGB BLD-MCNC: 15.2 G/DL (ref 13–17.7)
LDLC SERPL CALC-MCNC: 63 MG/DL (ref 0–100)
LDLC/HDLC SERPL: 1.71 {RATIO}
LEFT ATRIUM VOLUME INDEX: 23 ML/M2
LV EF 2D ECHO EST: 55 %
MAXIMAL PREDICTED HEART RATE: 151 BPM
MCH RBC QN AUTO: 27.8 PG (ref 26.6–33)
MCHC RBC AUTO-ENTMCNC: 32.8 G/DL (ref 31.5–35.7)
MCV RBC AUTO: 84.6 FL (ref 79–97)
PLATELET # BLD AUTO: 222 10*3/MM3 (ref 140–450)
PMV BLD AUTO: 9.4 FL (ref 6–12)
POTASSIUM SERPL-SCNC: 3.9 MMOL/L (ref 3.5–5.2)
PROT SERPL-MCNC: 5.4 G/DL (ref 6–8.5)
RBC # BLD AUTO: 5.47 10*6/MM3 (ref 4.14–5.8)
SINUS: 2.8 CM
SODIUM SERPL-SCNC: 141 MMOL/L (ref 136–145)
STJ: 2.7 CM
STRESS TARGET HR: 128 BPM
TRIGL SERPL-MCNC: 175 MG/DL (ref 0–150)
TSH SERPL DL<=0.05 MIU/L-ACNC: 0.61 UIU/ML (ref 0.27–4.2)
VLDLC SERPL-MCNC: 30 MG/DL (ref 5–40)
WBC # BLD AUTO: 6.3 10*3/MM3 (ref 3.4–10.8)

## 2021-02-22 PROCEDURE — 83036 HEMOGLOBIN GLYCOSYLATED A1C: CPT | Performed by: INTERNAL MEDICINE

## 2021-02-22 PROCEDURE — G0378 HOSPITAL OBSERVATION PER HR: HCPCS

## 2021-02-22 PROCEDURE — 97535 SELF CARE MNGMENT TRAINING: CPT

## 2021-02-22 PROCEDURE — 0 IOPAMIDOL PER 1 ML: Performed by: INTERNAL MEDICINE

## 2021-02-22 PROCEDURE — 70553 MRI BRAIN STEM W/O & W/DYE: CPT

## 2021-02-22 PROCEDURE — 97165 OT EVAL LOW COMPLEX 30 MIN: CPT

## 2021-02-22 PROCEDURE — 70496 CT ANGIOGRAPHY HEAD: CPT

## 2021-02-22 PROCEDURE — 93306 TTE W/DOPPLER COMPLETE: CPT

## 2021-02-22 PROCEDURE — 80053 COMPREHEN METABOLIC PANEL: CPT | Performed by: INTERNAL MEDICINE

## 2021-02-22 PROCEDURE — 80061 LIPID PANEL: CPT | Performed by: INTERNAL MEDICINE

## 2021-02-22 PROCEDURE — 99204 OFFICE O/P NEW MOD 45 MIN: CPT | Performed by: PSYCHIATRY & NEUROLOGY

## 2021-02-22 PROCEDURE — 82962 GLUCOSE BLOOD TEST: CPT

## 2021-02-22 PROCEDURE — A9577 INJ MULTIHANCE: HCPCS | Performed by: INTERNAL MEDICINE

## 2021-02-22 PROCEDURE — 70498 CT ANGIOGRAPHY NECK: CPT

## 2021-02-22 PROCEDURE — 85027 COMPLETE CBC AUTOMATED: CPT | Performed by: INTERNAL MEDICINE

## 2021-02-22 PROCEDURE — 93306 TTE W/DOPPLER COMPLETE: CPT | Performed by: INTERNAL MEDICINE

## 2021-02-22 PROCEDURE — 84443 ASSAY THYROID STIM HORMONE: CPT | Performed by: INTERNAL MEDICINE

## 2021-02-22 PROCEDURE — 0 GADOBENATE DIMEGLUMINE 529 MG/ML SOLUTION: Performed by: INTERNAL MEDICINE

## 2021-02-22 RX ORDER — FAMOTIDINE 20 MG/1
20 TABLET, FILM COATED ORAL 2 TIMES DAILY PRN
Status: DISCONTINUED | OUTPATIENT
Start: 2021-02-22 | End: 2021-02-22 | Stop reason: HOSPADM

## 2021-02-22 RX ORDER — ASPIRIN 81 MG/1
81 TABLET ORAL DAILY
Qty: 30 TABLET | Refills: 0
Start: 2021-02-22

## 2021-02-22 RX ORDER — ATORVASTATIN CALCIUM 40 MG/1
80 TABLET, FILM COATED ORAL NIGHTLY
Qty: 90 TABLET | Refills: 0 | Status: SHIPPED | OUTPATIENT
Start: 2021-02-22 | End: 2021-03-29 | Stop reason: SDUPTHER

## 2021-02-22 RX ORDER — CHOLECALCIFEROL (VITAMIN D3) 125 MCG
5 CAPSULE ORAL NIGHTLY PRN
Status: DISCONTINUED | OUTPATIENT
Start: 2021-02-22 | End: 2021-02-22 | Stop reason: HOSPADM

## 2021-02-22 RX ORDER — DOCUSATE SODIUM 100 MG/1
100 CAPSULE, LIQUID FILLED ORAL 2 TIMES DAILY PRN
Status: DISCONTINUED | OUTPATIENT
Start: 2021-02-22 | End: 2021-02-22 | Stop reason: HOSPADM

## 2021-02-22 RX ADMIN — ASPIRIN 325 MG: 325 TABLET ORAL at 09:18

## 2021-02-22 RX ADMIN — IOPAMIDOL 100 ML: 755 INJECTION, SOLUTION INTRAVENOUS at 16:18

## 2021-02-22 RX ADMIN — GADOBENATE DIMEGLUMINE 15 ML: 529 INJECTION, SOLUTION INTRAVENOUS at 10:26

## 2021-02-22 NOTE — CONSULTS
Neurology Note    Patient:  Brian Garcia    YOB: 1950    REFERRING PHYSICIAN:  Marybeth Drummond MD    CHIEF COMPLAINT:    dizziness    HISTORY OF PRESENT ILLNESS:   The patient is a 70 y.o. male with DM, HLP, had a sudden onset of vertigo and feeling off balance on getting up after working under his RV on Saturday that lasted about 30 minutes. That night his wife thought that his speech was somewhat slurred. Yday he had recurrence of vertigo, developed nausea, noticed that moving his head or his eyes made symptoms worse prompting him to come to ED where he was noted to have have some right sided nystagmus and worsening of symptoms on turning to the right but was o/w nonfocal with BP of 132/77, NSR, T97.7, , CT head with question of right cerebellar hypodensity. He received meclizine, Zofran, IVFs with resolution of symptoms. Denies having a headache, focal numbness, weakness, h/o head trauma, prior attacks of vertigo.    Past Medical History:  Past Medical History:   Diagnosis Date   • Bladder tumor    • Diabetes mellitus (CMS/HCC)    • History of asthma    • History of cardiac murmur as a child    • History of skin cancer    • Hyperlipidemia    • Type 2 diabetes mellitus (CMS/HCC)        Past Surgical History:  Past Surgical History:   Procedure Laterality Date   • ADENOIDECTOMY     • APPENDECTOMY     • CATARACT EXTRACTION, BILATERAL     • CERVICAL SPINE SURGERY     • LASIK Bilateral    • TONSILLECTOMY     • TRANSURETHRAL RESECTION OF BLADDER TUMOR N/A 2/4/2020    Procedure: TRANSURETHRAL RESECTION OF BLADDER TUMOR;  Surgeon: Leandro Rodas MD;  Location: [facility];  Service: Urology;  Laterality: N/A;       Social History:   Social History     Socioeconomic History   • Marital status:      Spouse name: Not on file   • Number of children: Not on file   • Years of education: Not on file   • Highest education level: Not on file   Tobacco Use   • Smoking status: Never  "Smoker   • Smokeless tobacco: Never Used   Substance and Sexual Activity   • Alcohol use: Never     Frequency: Never   • Drug use: Never        Family History:   Family History   Problem Relation Age of Onset   • Myelodysplastic syndrome Mother    • Other Father         Cardiovascular disorder   • Diabetes Other    • Hyperlipidemia Other    • Hypertension Other    • Malig Hyperthermia Neg Hx        Medications Prior to Admission:    Prior to Admission medications    Medication Sig Start Date End Date Taking? Authorizing Provider   Accu-Chek Softclix Lancets lancets Use as instructed. 12/7/20  Yes Farheen Aceves MD   B Complex-C-E-Zn (b complex-C-E-zinc) tablet Take 1 tablet by mouth Daily. Thursdays   Yes Farheen Aceves MD   Empagliflozin 25 MG tablet Take 25 mg by mouth Every Morning.   Yes Farheen Aceves MD   glucose blood (Accu-Chek Lisa Plus) test strip 1 strip by Other route 3 (Three) Times a Day. 12/7/20 3/7/21 Yes Farheen Aceves MD   Multiple Vitamins-Minerals (MULTIVITAL-M PO) Take 1 tablet by mouth Daily.   Yes Farheen Aceves MD   Needle, Disp, 22G X 1\" misc 1 inj weekly 11/4/18  Yes Harriet Rios APRN   Semaglutide,0.25 or 0.5MG/DOS, (Ozempic, 0.25 or 0.5 MG/DOSE,) 2 MG/1.5ML solution pen-injector Inject 0.5 mg under the skin into the appropriate area as directed 1 (One) Time Per Week. On Tuesday 10/26/20  Yes Farheen Aceves MD   Syringe 18G X 1-1/2\" 3 ML misc 1 inj every week 2/27/19  Yes Jerzy Lindsey MD   Testosterone 1.62 % gel Place 2 application on the skin as directed by provider 2 (two) times a day. Two pumps to each shoulder daily   Yes Farheen Aceves MD   vitamin D (ERGOCALCIFEROL) 1.25 MG (05851 UT) capsule capsule Take 50,000 Units by mouth Every 7 (Seven) Days. Takes on Thursdays   Yes Farheen Aceves MD   aspirin (aspirin) 81 MG EC tablet Take 1 tablet by mouth Daily. 2/22/21   Helio Saucedo MD   atorvastatin (LIPITOR) " 40 MG tablet Take 2 tablets by mouth Every Night. 2/22/21   Helio Saucedo MD       Allergies:  Codeine, Doxycycline, Invokana [canagliflozin], Metformin and related, Penicillins, Sulfa antibiotics, Terazosin, and Tramadol      Review of system  Review of Systems   Gastrointestinal: Positive for nausea.   Musculoskeletal: Positive for gait problem.   Neurological: Positive for dizziness and speech difficulty.   All other systems reviewed and are negative.      Vitals:    02/22/21 1314   BP: 125/77   Pulse: 91   Resp: 18   Temp: 98.2 °F (36.8 °C)   SpO2: 97%       Physical exam  Physical Exam  Constitutional:       Appearance: He is well-developed.   HENT:      Head: Normocephalic and atraumatic.   Eyes:      Extraocular Movements: Extraocular movements intact.      Pupils: Pupils are equal, round, and reactive to light.   Cardiovascular:      Rate and Rhythm: Normal rate and regular rhythm.   Pulmonary:      Effort: Pulmonary effort is normal.   Neurological:      General: No focal deficit present.      Mental Status: He is alert and oriented to person, place, and time.      Cranial Nerves: No cranial nerve deficit.      Sensory: No sensory deficit.      Motor: No weakness.      Coordination: Coordination normal.      Deep Tendon Reflexes: Reflexes are normal and symmetric. Reflexes normal. Babinski sign absent on the right side. Babinski sign absent on the left side.      Comments: Speech clear, VFF, no nystagmus, no facial droop, no limb drift.   Psychiatric:         Behavior: Behavior normal.         Thought Content: Thought content normal.           Lab Results   Component Value Date    WBC 6.30 02/22/2021    HGB 15.2 02/22/2021    HCT 46.3 02/22/2021    MCV 84.6 02/22/2021     02/22/2021     Lab Results   Component Value Date    GLUCOSE 81 02/22/2021    BUN 16 02/22/2021    CREATININE 0.93 02/22/2021    EGFRIFNONA 81 02/22/2021    EGFRIFAFRI 80 09/30/2020    BCR 17.2 02/22/2021    CO2 23.1  2021    CALCIUM 8.0 (L) 2021    PROTENTOTREF 6.3 2020    ALBUMIN 3.20 (L) 2021    LABIL2 1.4 2021    AST 12 2021    ALT 16 2021     Echo Complete w/Doppler and Color Flow  Order# [id number]  Reading physician: Yvrose Vera MD Ordering physician: Marybeth Drummond MD Study date: 21   Patient Information    Patient Name   Brian Garcia MRN   [id number] Sex   Male  (Age)   1950 (70 y.o.)   Admission Information    Admission Date/Time Discharge Date/Time Room/Bed   21  12:02 PM  P596/1   PACS Images     Show images for Adult transthoracic echo complete    Sedation Narrator Report    Sedation Narrator Report      Interpretation Summary    · Calculated left ventricular EF = 55.4% Estimated left ventricular EF = 55% Estimated left ventricular EF was in agreement with the calculated left ventricular EF. Left ventricular systolic function is normal. Normal left ventricular cavity size noted. Left ventricular wall thickness is consistent with mild concentric hypertrophy. All left ventricular wall segments contract normally. Left ventricular diastolic function is consistent with (grade I) impaired relaxation.  · Left atrial volume is normal. Saline test results are negative.  · Trace tricuspid valve regurgitation is present. Insufficient TR velocity profile to estimate the right ventricular systolic pressure.            Radiological Studies:    Ct Head Without Contrast    Result Date: 2021  CT HEAD WITHOUT CONTRAST  CLINICAL HISTORY: Dizziness.  CT scan of the head was obtained with 3 mm axial images. No intravenous contrast was administered.  FINDINGS:  The ventricles, sulci, and cisterns are age appropriate. The basal ganglia and thalami are unremarkable. The gray-white matter differentiation is within normal limits. There are relatively mild changes of chronic small vessel ischemic phenomena. There is a 7 x 2 mm hypodensity within the right cerebellar  hemisphere which could represent enlarged perivascular space or an age-indeterminate PICA distribution infarct.       Mild changes of chronic small vessel ischemic phenomena are identified. There is a subcentimeter hypodensity within the right cerebellar hemisphere which could represent an enlarged perivascular space or a tiny age-indeterminate right PICA distribution infarct. Otherwise, there is no convincing evidence to suggest acute renal pathology on the CT. Further evaluation could be performed with with MR imaging as clinically indicated.  Findings were discussed with Dr. Castro on 02/21/2021 at approximately 1:47 PM.  Radiation dose reduction techniques were utilized, including automated exposure control and exposure modulation based on body size.  This report was finalized on 2/21/2021 2:37 PM by Dr. Cosme Carroll M.D.      Mri Brain With & Without Contrast    Result Date: 2/22/2021  MRI OF THE BRAIN WITH AND WITHOUT CONTRAST  CLINICAL HISTORY: Dizziness and loss of balance.  MRI of the brain was obtained with sagittal pregadolinium and postgadolinium T1, axial pregadolinium and postgadolinium T1, coronal postgadolinium T1, axial postgadolinium 3D SPGR, axial FLAIR, axial T2, axial diffusion, and axial gradient echo images.  FINDINGS:  The ventricles, sulci, and cisterns are age appropriate. The midline intracranial anatomy is within normal limits. There are no abnormal areas of restricted diffusion within the brain parenchyma. There are mild-to-moderate changes of chronic small vessel ischemic phenomena. The major intracranial flow related signal voids are within normal limits. There are no abnormal areas of susceptibility artifact within the brain parenchyma. There are no abnormal areas of contrast enhancement within the brain parenchyma.  Incidental note is made of minor mucosal thickening within the ethmoid air cells and a trace right mastoid effusion.       No evidence for acute intracranial pathology.   Mild-to-moderate changes of chronic small vessel ischemic phenomena are incidentally noted.  This report was finalized on 2/22/2021 11:42 AM by Dr. Cosme Carroll M.D.          During this visit the following were done:  Labs Reviewed [x]    Labs Ordered []    Radiology Reports Reviewed [x]    Radiology Ordered []    EKG, echo, and/or stress test reviewed [x]    EEG results reviewed  []    EEG reviewed and interpreted per myself   []    Discussed case with neurointerventionalist or neuroradiologist []    Referring Provider Records Reviewed []    ER Records Reviewed []    Hospital Records Reviewed [x]    History Obtained From Family []    Radiological images view and Interpreted per myself [x]    Case Discussed with referring provider []     Decision to obtain and request outside records  []        Assessment and Plan     Positional vertigo and question of dysarthria, resolved. Favor BPPV over a posterior circulation TIA. The patient does have multiple vascular risks and moderate chronic small vessel disease on cranial MRI, no evidence of acute stroke, personally reviewed.   - Observation on telemetry.   - CTA head and neck.   - If CTA is benign, okay for discharge on low dose aspirin and statin, outpatient neurology F/U as needed.    Thanks,              Electronically signed by Dmitry rAriola MD on 2/22/2021 at 13:41 EST

## 2021-02-22 NOTE — PLAN OF CARE
"Goal Outcome Evaluation:  Plan of Care Reviewed With: patient      Pt A+O x4, VSS (slight tachycardia noted- low 100 bpm). NIHSS 2 (slight dysarthria and RLE sensation difference). No complaints of pain. Tolerates diet well. Ambulates to restroom assist standby well. Pt mentions dizziness is much better now, but can at certain moments feel \"like things are spinning out of place.\" Pending MRI. No major complaints this shift. I will continue to monitor and progress towards goals as tolerated.     0648- I was notified by Vin from MRI  that he recommends MRI of Brain order be updated to include with & without contrast due to pt's history of bladder cancer. I called and spoke with Dr. Saucedo about this and order was updated as BUN and Creatinine levels are WNL.   "

## 2021-02-22 NOTE — DISCHARGE SUMMARY
[facility] Medicine Services  DISCHARGE SUMMARY    Patient Name: Brian Garcia  : 1950  MRN: [id number]    Date of Admission: 2021 12:02 PM  Date of Discharge:  2021  Primary Care Physician: Andrey Wolfe MD    Consults     Date and Time Order Name Status Description    2021 1754 Inpatient Neurology Consult Stroke Completed     2021 1504 A (on-call MD unless specified) Details Completed           Hospital Course     Presenting Problem:   Dizziness [R42]  Ataxia [R27.0]    Active Hospital Problems    Diagnosis  POA   • **Vertigo [R42]  Yes   • Slurred speech, transient [R47.81]  Yes   • Impaired left ventricular relaxation [I51.9]  Yes   • Low testosterone [R79.89]  Yes   • Mixed hyperlipidemia [E78.2]  Yes   • Non-insulin dependent type 2 diabetes mellitus (CMS/HCC) [E11.9]  Yes      Resolved Hospital Problems   No resolved problems to display.          Hospital Course:  Brian Garcia is a 70 y.o. male presents the hospital with a complaint of dizziness which he describes as a type of vertigo.  He does not describe lightheadedness.  He does say that his wife reported slurred speech.  He reported the slurred speech was very short-lived and did not last more than a few seconds.  He reported the vertigo persisted until he got in the hospital but has now completely resolved.  Vertigo was worsened with certain head movement and history seems most consistent with benign positional vertigo.  CT scan of the head showed small vessel ischemic changes and there was a questionable subcentimeter hypodensity in the right cerebellar hemisphere.  MRI was recommended.  MRI showed no acute pathology but chronic small vessel ischemic changes were noted.  I discussed this with the patient and recommended he be discharged on aspirin and atorvastatin.  As a diabetic he possibly could also benefit from low-dose lisinopril but he was opposed to this and agreed to follow-up with primary care provider  for blood pressure check.  Patient states he wants to discharge today and appears hemodynamically stable to discharge.  Discharge differential diagnosis includes benign positional vertigo as most likely etiology but less likely this is possible to be a TIA.  Either way aspirin and atorvastatin are indicated with close primary care follow-up which patient agrees with.    At the time of discharge patient was told to take all medications as prescribed, keep all follow-up appointments, and call their doctor or return to the hospital with any worsening or concerning symptoms.    Please note that dragon voice recognition software was used to create this note and that transcription errors are possible.      Day of Discharge     HPI:   Patient denies any slurred speech today.  He says his vertigo is now completely resolved.  He is very pleased with his progress.  I discussed and reviewed all imaging finding including echocardiogram with him.  He was not interested in any blood pressure medication and agrees to follow-up with his primary care provider.  He was agreeable to discharge on aspirin and atorvastatin to lower his future stroke risk.    ROS:  No current fevers or chills  No current shortness of breath or cough  No current nausea, vomiting, or diarrhea  No current chest pain or palpitations    Vital Signs:     Reviewed    Physical Exam:  Constitutional:Awake, alert  HENT: NCAT, mucous membranes moist, neck supple  Respiratory: Clear to auscultation bilaterally, respiratory effort normal, nonlabored breathing   Cardiovascular: RRR, normal radial pulses  Gastrointestinal: Positive bowel sounds, soft, nontender, nondistended  Musculoskeletal: Normal musculature for age, no lower extremity edema, BMI 28  Psychiatric: Appropriate affect, cooperative, conversational  Neurologic: Oriented x3, strength intact and movement intact in all extremities, no slurred speech or facial droop, follows commands  Skin: No rashes or  jaundice, warm      Pertinent  and/or Most Recent Results     Results from last 7 days   Lab Units 02/22/21  0446 02/21/21  1241   WBC 10*3/mm3 6.30 7.26   HEMOGLOBIN g/dL 15.2 16.1   HEMATOCRIT % 46.3 48.8   PLATELETS 10*3/mm3 222 234   SODIUM mmol/L 141 139   POTASSIUM mmol/L 3.9 4.3   CHLORIDE mmol/L 109* 103   CO2 mmol/L 23.1 23.2   BUN mg/dL 16 18   CREATININE mg/dL 0.93 0.84   GLUCOSE mg/dL 81 144*   CALCIUM mg/dL 8.0* 9.6     Results from last 7 days   Lab Units 02/22/21  0446 02/21/21  1241   BILIRUBIN mg/dL 0.5 0.3   ALK PHOS U/L 18* 24*   ALT (SGPT) U/L 16 19   AST (SGOT) U/L 12 20     Results from last 7 days   Lab Units 02/22/21  0446   CHOLESTEROL mg/dL 127   TRIGLYCERIDES mg/dL 175*   HDL CHOL mg/dL 34*     Results from last 7 days   Lab Units 02/22/21  0446 02/21/21  1241   TSH uIU/mL 0.607  --    HEMOGLOBIN A1C % 6.98*  --    TROPONIN T ng/mL  --  <0.010       Brief Urine Lab Results  (Last result in the past 365 days)      Color   Clarity   Blood   Leuk Est   Nitrite   Protein   CREAT   Urine HCG        09/30/20 0927             55.6             Microbiology Results Abnormal     Procedure Component Value - Date/Time    COVID PRE-OP / PRE-PROCEDURE SCREENING ORDER (NO ISOLATION) - Swab, Nasopharynx [[id number]]  (Normal) Collected: 02/21/21 1507    Lab Status: Final result Specimen: Swab from Nasopharynx Updated: 02/21/21 2415    Narrative:      The following orders were created for panel order COVID PRE-OP / PRE-PROCEDURE SCREENING ORDER (NO ISOLATION) - Swab, Nasopharynx.  Procedure                               Abnormality         Status                     ---------                               -----------         ------                     COVID-19,APTIMA PANTHER,...[[id number]]  Normal              Final result                 Please view results for these tests on the individual orders.    COVID-19,APTIMA [facility] IN-HOUSE, NP/OP SWAB IN UTM/VTM/SALINE TRANSPORT MEDIA,24 HR TAT - Swab,  Nasopharynx [[id number]]  (Normal) Collected: 02/21/21 1507    Lab Status: Final result Specimen: Swab from Nasopharynx Updated: 02/21/21 1855     COVID19 Not Detected    Narrative:      Fact sheet for providers: https://www.fda.gov/media/532800/download     Fact sheet for patients: https://www.fda.gov/media/262785/download    Test performed by RT PCR.          Imaging Results (All)     Procedure Component Value Units Date/Time    MRI Brain With & Without Contrast [[id number]] Collected: 02/22/21 1139     Updated: 02/22/21 1145    Narrative:      MRI OF THE BRAIN WITH AND WITHOUT CONTRAST     CLINICAL HISTORY: Dizziness and loss of balance.     MRI of the brain was obtained with sagittal pregadolinium and  postgadolinium T1, axial pregadolinium and postgadolinium T1, coronal  postgadolinium T1, axial postgadolinium 3D SPGR, axial FLAIR, axial T2,  axial diffusion, and axial gradient echo images.     FINDINGS:     The ventricles, sulci, and cisterns are age appropriate. The midline  intracranial anatomy is within normal limits. There are no abnormal  areas of restricted diffusion within the brain parenchyma. There are  mild-to-moderate changes of chronic small vessel ischemic phenomena. The  major intracranial flow related signal voids are within normal limits.  There are no abnormal areas of susceptibility artifact within the brain  parenchyma. There are no abnormal areas of contrast enhancement within  the brain parenchyma.     Incidental note is made of minor mucosal thickening within the ethmoid  air cells and a trace right mastoid effusion.       Impression:         No evidence for acute intracranial pathology.     Mild-to-moderate changes of chronic small vessel ischemic phenomena are  incidentally noted.     This report was finalized on 2/22/2021 11:42 AM by Dr. Cosme Carroll M.D.       CT Head Without Contrast [[id number]] Collected: 02/21/21 1358     Updated: 02/21/21 1440    Narrative:      CT HEAD WITHOUT  CONTRAST     CLINICAL HISTORY: Dizziness.     CT scan of the head was obtained with 3 mm axial images. No intravenous  contrast was administered.     FINDINGS:     The ventricles, sulci, and cisterns are age appropriate. The basal  ganglia and thalami are unremarkable. The gray-white matter  differentiation is within normal limits. There are relatively mild  changes of chronic small vessel ischemic phenomena. There is a 7 x 2 mm  hypodensity within the right cerebellar hemisphere which could represent  enlarged perivascular space or an age-indeterminate PICA distribution  infarct.       Impression:         Mild changes of chronic small vessel ischemic phenomena are identified.  There is a subcentimeter hypodensity within the right cerebellar  hemisphere which could represent an enlarged perivascular space or a  tiny age-indeterminate right PICA distribution infarct. Otherwise, there  is no convincing evidence to suggest acute renal pathology on the CT.  Further evaluation could be performed with with MR imaging as clinically  indicated.     Findings were discussed with Dr. Castro on 02/21/2021 at approximately  1:47 PM.     Radiation dose reduction techniques were utilized, including automated  exposure control and exposure modulation based on body size.     This report was finalized on 2/21/2021 2:37 PM by Dr. Cosme Carroll M.D.              Impression:     1. No hemodynamically significant focal stenosis, aneurysm, or   dissection in the cervical carotid or vertebral arteries or the arteries   at the base of the brain.       2. No acute intracranial hemorrhage or hydrocephalus. No enhancing   lesion in brain. If there is further clinical concern, MRI could be   considered for further evaluation.                      Results for orders placed during the hospital encounter of 02/21/21   Adult transthoracic echo complete    Narrative · Calculated left ventricular EF = 55.4% Estimated left ventricular EF =   55%  "Estimated left ventricular EF was in agreement with the calculated   left ventricular EF. Left ventricular systolic function is normal. Normal   left ventricular cavity size noted. Left ventricular wall thickness is   consistent with mild concentric hypertrophy. All left ventricular wall   segments contract normally. Left ventricular diastolic function is   consistent with (grade I) impaired relaxation.  · Left atrial volume is normal. Saline test results are negative.  · Trace tricuspid valve regurgitation is present. Insufficient TR velocity   profile to estimate the right ventricular systolic pressure.            Discharge Details        Discharge Medications      New Medications      Instructions Start Date   aspirin 81 MG EC tablet   81 mg, Oral, Daily      atorvastatin 40 MG tablet  Commonly known as: LIPITOR   80 mg, Oral, Nightly         Continue These Medications      Instructions Start Date   Accu-Chek Lisa Plus test strip  Generic drug: glucose blood   1 strip, Other, 3 Times Daily      Accu-Chek Softclix Lancets lancets   Use as instructed.      b complex-C-E-zinc tablet   1 tablet, Oral, Daily, Thursdays      Empagliflozin 25 MG tablet   25 mg, Oral, Every Morning      multivitamin with minerals tablet tablet   1 tablet, Oral, Daily      Needle (Disp) 22G X 1\" misc   1 inj weekly      Ozempic (0.25 or 0.5 MG/DOSE) 2 MG/1.5ML solution pen-injector  Generic drug: Semaglutide(0.25 or 0.5MG/DOS)   0.5 mg, Subcutaneous, Weekly, On Tuesday      Syringe 18G X 1-1/2\" 3 ML misc   1 inj every week      Testosterone 1.62 % gel   2 application, Transdermal, 2 times daily, Two pumps to each shoulder daily      vitamin D 1.25 MG (72271 UT) capsule capsule  Commonly known as: ERGOCALCIFEROL   50,000 Units, Oral, Every 7 Days, Takes on Thursdays             Allergies   Allergen Reactions   • Codeine    • Doxycycline Hives and Itching   • Invokana [Canagliflozin]      Caused floaters in both eyes    • Metformin And " Related GI Intolerance     GI   • Penicillins    • Sulfa Antibiotics    • Terazosin Dizziness     Dizzy, blur vision, breathing problems    • Tramadol          Discharge Disposition:  Home or Self Care    Diet:  Hospital:  No active diet order      Activity:  Activity Instructions     Activity as Tolerated                 CODE STATUS:    Code Status and Medical Interventions:   Ordered at: 02/21/21 1754     Level Of Support Discussed With:    Patient     Code Status:    CPR     Medical Interventions (Level of Support Prior to Arrest):    Full       Future Appointments   Date Time Provider Department Center   2/25/2021  1:45 PM Andrey Wolfe MD [facility]   5/20/2021 10:30 AM Andrey Wolfe MD [facility]       Additional Instructions for the Follow-ups that You Need to Schedule     Discharge Follow-up with PCP   As directed       Currently Documented PCP:    Andrey Wolfe MD    PCP Phone Number:    [phone]     Follow Up Details: 1 week                     Helio Saucedo MD  02/24/21      Time Spent on Discharge:  I spent  33 minutes on this discharge activity which included: face-to-face encounter with the patient, reviewing the data in the system, coordination of the care with the nursing staff as well as consultants, documentation, and entering orders.

## 2021-02-22 NOTE — THERAPY DISCHARGE NOTE
Acute Care - Occupational Therapy Discharge  [facility]    Patient Name: Brian Garcia  : 1950    MRN: [id number]                              Today's Date: 2021       Admit Date: 2021    Visit Dx:     ICD-10-CM ICD-9-CM   1. Dizziness  R42 780.4   2. Ataxia  R27.0 781.3     Patient Active Problem List   Diagnosis   • Acute bronchitis   • Acute pharyngitis   • Acute upper respiratory infection   • Allergic rhinitis   • Carpal tunnel syndrome   • Cough   • Non-insulin dependent type 2 diabetes mellitus (CMS/HCC)   • Alimentary obesity   • Fatigue   • Effusion of knee   • ED (erectile dysfunction) of organic origin   • Muscle ache   • Thumb pain   • Welcome to Medicare preventive visit   • Mixed hyperlipidemia   • Vitamin D deficiency   • Refusal of statin medication by patient   • Medication management   • Male hypogonadism   • Low testosterone   • Benign prostatic hyperplasia without lower urinary tract symptoms   • Hives   • Vertigo   • Slurred speech, transient   • Impaired left ventricular relaxation     Past Medical History:   Diagnosis Date   • Bladder tumor    • Diabetes mellitus (CMS/HCC)    • History of asthma    • History of cardiac murmur as a child    • History of skin cancer    • Hyperlipidemia    • Type 2 diabetes mellitus (CMS/HCC)      Past Surgical History:   Procedure Laterality Date   • ADENOIDECTOMY     • APPENDECTOMY     • CATARACT EXTRACTION, BILATERAL     • CERVICAL SPINE SURGERY     • LASIK Bilateral    • TONSILLECTOMY     • TRANSURETHRAL RESECTION OF BLADDER TUMOR N/A 2020    Procedure: TRANSURETHRAL RESECTION OF BLADDER TUMOR;  Surgeon: Leandro Rodas MD;  Location: [facility];  Service: Urology;  Laterality: N/A;     General Information     Row Name 21 1352          OT Time and Intention    Document Type  discharge evaluation/summary  -RD     Mode of Treatment  occupational therapy  -RD     Row Name 21 1352          General Information     Patient Profile Reviewed  yes  -RD     Prior Level of Function  independent:;ADL's  -RD     Row Name 02/22/21 1352          Living Environment    Lives With  spouse  -RD     Row Name 02/22/21 1352          Cognition    Orientation Status (Cognition)  oriented x 4  -RD       User Key  (r) = Recorded By, (t) = Taken By, (c) = Cosigned By    Initials Name Provider Type    RD Heather Louie, OT Occupational Therapist        Mobility/ADL's     Row Name 02/22/21 1354          Bed Mobility    Bed Mobility  supine-sit;sit-supine  -RD     Supine-Sit Dana (Bed Mobility)  supervision  -RD     Sit-Supine [place] (Bed Mobility)  supervision  -RD     Row Name 02/22/21 1354          Transfers    Transfers  sit-stand transfer;toilet transfer  -RD     Sit-Stand Dana (Transfers)  standby assist;supervision  -RD     [place] Level (Toilet Transfer)  standby assist;supervision  -RD     Row Name 02/22/21 1354          Functional Mobility    Functional Mobility- Ind. Level  standby assist;supervision required  -RD     Functional Mobility- Comment  pt able to ambulate throughout room and into/out of bathroom w/ SBA/Sup- pt reports dizziness has resolved and he feels near his baseline level  -RD     Row Name 02/22/21 1354          Activities of Daily Living    BADL Assessment/Intervention  lower body dressing;grooming;toileting  -RD     Row Name 02/22/21 1354          Lower Body Dressing Assessment/Training    [place] Level (Lower Body Dressing)  lower body dressing skills;doff;don;socks;supervision  -RD     Position (Lower Body Dressing)  edge of bed sitting  -RD     Row Name 02/22/21 1354          Grooming Assessment/Training    [place] Level (Grooming)  grooming skills;wash face, hands;supervision  -RD     Position (Grooming)  sink side;unsupported standing  -RD     Comment (Grooming)  pt stands at sink to wash hands w/ Sup for standing balance  -RD     Row Name 02/22/21 1354          Toileting  Assessment/Training    [place] Level (Toileting)  toileting skills;adjust/manage clothing;perform perineal hygiene;supervision  -RD     Position (Toileting)  supported sitting;supported standing  -RD       User Key  (r) = Recorded By, (t) = Taken By, (c) = Cosigned By    Initials Name Provider Type    Heather Schilling OT Occupational Therapist        Obj/Interventions     Row Name 02/22/21 1357          Sensory Assessment (Somatosensory)    Sensory Assessment (Somatosensory)  UE sensation intact  -RD     Row Name 02/22/21 1357          Range of Motion Comprehensive    General Range of Motion  bilateral upper extremity ROM WFL  -RD     Row Name 02/22/21 1357          Strength Comprehensive (MMT)    General Manual Muscle Testing (MMT) Assessment  no strength deficits identified  -RD     Comment, General Manual Muscle Testing (MMT) Assessment  B UE MMT: grossly WFL; no unilateral weakness noted  -     Row Name 02/22/21 1357          Balance    Balance Assessment  sitting static balance;sitting dynamic balance;standing static balance  -RD     Static Sitting Balance  WFL;sitting, edge of bed  -RD     Dynamic Sitting Balance  WFL;sitting, edge of bed during LB dressing task  -RD     Static Standing Balance  WFL;standing;supported  -RD       User Key  (r) = Recorded By, (t) = Taken By, (c) = Cosigned By    Initials Name Provider Type    Heather Schilling OT Occupational Therapist        Goals/Plan     Row Name 02/22/21 1359          Dressing Goal 1 (OT)    Activity/Device (Dressing Goal 1, OT)  lower body dressing OT educ safety techniques during ADLs for increased safety at home  -RD     [place]/Cues Needed (Dressing Goal 1, OT)  supervision required  -RD     Time Frame (Dressing Goal 1, OT)  short term goal (STG);1 day  -RD     Progress/Outcome (Dressing Goal 1, OT)  goal met  -RD       User Key  (r) = Recorded By, (t) = Taken By, (c) = Cosigned By    Initials Name Provider Type    ALLYSON Louie  Heather Harkins, ALAN Occupational Therapist        Clinical Impression     Row Name 02/22/21 1402          Pain Assessment    Additional Documentation  Pain Scale: FACES Pre/Post-Treatment (Group)  -RD     Row Name 02/22/21 1407          Pain Scale: FACES Pre/Post-Treatment    Pain: FACES Scale, Pretreatment  0-->no hurt  -RD     Posttreatment Pain Rating  0-->no hurt  -RD     Row Name 02/22/21 1400          Plan of Care Review    Plan of Care Reviewed With  patient  -RD     Progress  improving  -RD     Outcome Summary  Pt is a 70 year old male admitted d/t dizziness, nausea, and feeling off balance. Pt lives w/ spouse and reports indep at PLOF w/ no AD. Pt presents to OT eval at or near baseline level- currently completing ADLs, transfers, and fucntional ambulation in room and bathroom w/ Sup. Pt able to complete toileting, LB dressing, and grooming from standing at sink position w/ Sup. Pt reports dizziness has rseolved. OT educ on safety techniques during ADLs and transfers for increased home safety- noteably for dizziness. No further skilled OT services warranted. OT to sign off. Pt in agreement. Pt and OT wore standard mask during session as well as OT wore gloves, glasses, and performed through hand hygiene before and after session.  -RD     Row Name 02/22/21 1406          Therapy Assessment/Plan (OT)    Criteria for Skilled Therapeutic Interventions Met (OT)  no;no problems identified which require skilled intervention  -RD     Row Name 02/22/21 1406          Therapy Plan Review/Discharge Plan (OT)    Anticipated Discharge Disposition (OT)  home with assist  -     Row Name 02/22/21 1408          Vital Signs    O2 Delivery Pre Treatment  room air  -RD     O2 Delivery Intra Treatment  room air  -RD     O2 Delivery Post Treatment  room air  -RD     Row Name 02/22/21 140          Positioning and Restraints    Pre-Treatment Position  in bed  -RD     Post Treatment Position  bed  -RD     In Bed  fowlers;call light  within reach;encouraged to call for assist;notified nsg no exist alarm on upon OT entry  -RD       User Key  (r) = Recorded By, (t) = Taken By, (c) = Cosigned By    Initials Name Provider Type    Heather Schilling OT Occupational Therapist        Outcome Measures     Row Name 02/22/21 1400          How much help from another is currently needed...    Putting on and taking off regular lower body clothing?  3  -RD     Bathing (including washing, rinsing, and drying)  3  -RD     Toileting (which includes using toilet bed pan or urinal)  3  -RD     Putting on and taking off regular upper body clothing  4  -RD     Taking care of personal grooming (such as brushing teeth)  4  -RD     Eating meals  4  -RD     AM-PAC 6 Clicks Score (OT)  21  -RD     Row Name 02/22/21 1400          Modified Delta Scale    Modified Parish Scale  0 - No Symptoms at all.  -RD     Row Name 02/22/21 1400          Functional Assessment    Outcome Measure Options  AM-PAC 6 Clicks Daily Activity (OT);Modified Delta  -RD       User Key  (r) = Recorded By, (t) = Taken By, (c) = Cosigned By    Initials Name Provider Type    Heather Schilling OT Occupational Therapist          OT Recommendation and Plan  Retired Outcome Summary/Treatment Plan (OT)  Anticipated Discharge Disposition (OT): home with assist  Plan of Care Review  Plan of Care Reviewed With: patient  Progress: improving  Outcome Summary: Pt is a 70 year old male admitted d/t dizziness, nausea, and feeling off balance. Pt lives w/ spouse and reports indep at OF w/ no AD. Pt presents to OT eval at or near baseline level- currently completing ADLs, transfers, and fucntional ambulation in room and bathroom w/ Sup. Pt able to complete toileting, LB dressing, and grooming from standing at sink position w/ Sup. Pt reports dizziness has rseolved. OT educ on safety techniques during ADLs and transfers for increased home safety- noteably for dizziness. No further skilled OT services  warranted. OT to sign off. Pt in agreement. Pt and OT wore standard mask during session as well as OT wore gloves, glasses, and performed through hand hygiene before and after session.  Plan of Care Reviewed With: patient  Outcome Summary: Pt is a 70 year old male admitted d/t dizziness, nausea, and feeling off balance. Pt lives w/ spouse and reports indep at PLOF w/ no AD. Pt presents to OT eval at or near baseline level- currently completing ADLs, transfers, and fucntional ambulation in room and bathroom w/ Sup. Pt able to complete toileting, LB dressing, and grooming from standing at sink position w/ Sup. Pt reports dizziness has rseolved. OT educ on safety techniques during ADLs and transfers for increased home safety- noteably for dizziness. No further skilled OT services warranted. OT to sign off. Pt in agreement. Pt and OT wore standard mask during session as well as OT wore gloves, glasses, and performed through hand hygiene before and after session.     Time Calculation:   Time Calculation- OT     Row Name 02/22/21 1412             Time Calculation- OT    OT Start Time  1056  -RD      OT Stop Time  1111  -RD      OT Time Calculation (min)  15 min  -RD      Total Timed Code Minutes- OT  9 minute(s)  -RD      OT Received On  02/22/21  -RD        User Key  (r) = Recorded By, (t) = Taken By, (c) = Cosigned By    Initials Name Provider Type    Heather Schilling OT Occupational Therapist        Therapy Charges for Today     Code Description Service Date Service Provider Modifiers Qty    [id number]  OT SELF CARE/MGMT/TRAIN EA 15 MIN 2/22/2021 Heather Louie OT GO 1    [id number]  OT EVAL LOW COMPLEXITY 2 2/22/2021 Heather Louie OT GO 1               Heather Louie OT  2/22/2021

## 2021-02-22 NOTE — PLAN OF CARE
Goal Outcome Evaluation:  Plan of Care Reviewed With: patient  Progress: improving  Outcome Summary: Pt is a 70 year old male admitted d/t dizziness, nausea, and feeling off balance. Pt lives w/ spouse and reports indep at PLOF w/ no AD. Pt presents to OT eval at or near baseline level- currently completing ADLs, transfers, and fucntional ambulation in room and bathroom w/ Sup. Pt able to complete toileting, LB dressing, and grooming from standing at sink position w/ Sup. Pt reports dizziness has rseolved. OT educ on safety techniques during ADLs and transfers for increased home safety- noteably for dizziness. No further skilled OT services warranted. OT to sign off. Pt in agreement. Pt and OT wore standard mask during session as well as OT wore gloves, glasses, and performed through hand hygiene before and after session.

## 2021-02-22 NOTE — H&P
HISTORY AND PHYSICAL   [facility]        Patient Identification:  Name: Brian Garcia  Age: 70 y.o.  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; he was under the vehicle and the symptoms started when he tried to get up; he felt better after a while but then had another episode this morning; he has a history of diabetes and hyperlipidemia but not hypertension    History of Present Illness:   As above    Past Medical History:  Past Medical History:   Diagnosis Date   • Bladder tumor    • Diabetes mellitus (CMS/HCC)    • History of asthma    • History of cardiac murmur as a child    • History of skin cancer    • Hyperlipidemia    • Type 2 diabetes mellitus (CMS/HCC)      Past Surgical History:  Past Surgical History:   Procedure Laterality Date   • ADENOIDECTOMY     • APPENDECTOMY     • CATARACT EXTRACTION, BILATERAL     • CERVICAL SPINE SURGERY     • LASIK Bilateral    • TONSILLECTOMY     • TRANSURETHRAL RESECTION OF BLADDER TUMOR N/A 2020    Procedure: TRANSURETHRAL RESECTION OF BLADDER TUMOR;  Surgeon: Leandro Rodas MD;  Location: [facility];  Service: Urology;  Laterality: N/A;      Home Meds:  Medications Prior to Admission   Medication Sig Dispense Refill Last Dose   • Accu-Chek Softclix Lancets lancets Use as instructed.      • B Complex-C-E-Zn (b complex-C-E-zinc) tablet Take 1 tablet by mouth Daily.    Past Week at Unknown time   • Empagliflozin 25 MG tablet Take 25 mg by mouth Every Morning.   2021 at Unknown time   • glucose blood (Accu-Chek Lisa Plus) test strip 1 strip by Other route 3 (Three) Times a Day.      • Multiple Vitamins-Minerals (MULTIVITAL-M PO) Take 1 tablet by mouth Daily.   2021 at Unknown  "time   • Needle, Disp, 22G X 1\" misc 1 inj weekly 12 each 4    • Semaglutide,0.25 or 0.5MG/DOS, (Ozempic, 0.25 or 0.5 MG/DOSE,) 2 MG/1.5ML solution pen-injector Inject 0.5 mg under the skin into the appropriate area as directed 1 (One) Time Per Week. On Tuesday   Past Week at Unknown time   • Syringe 18G X 1-1/2\" 3 ML misc 1 inj every week 12 each 4    • Testosterone 1.62 % gel Place 2 application on the skin as directed by provider 2 (two) times a day. Two pumps to each shoulder daily   2/20/2021 at Unknown time   • vitamin D (ERGOCALCIFEROL) 1.25 MG (73789 UT) capsule capsule Take 50,000 Units by mouth Every 7 (Seven) Days. Takes on Thursdays   Past Week at Unknown time       Allergies:  Allergies   Allergen Reactions   • Codeine    • Doxycycline Hives and Itching   • Invokana [Canagliflozin]      Caused floaters in both eyes    • Metformin And Related GI Intolerance     GI   • Penicillins    • Sulfa Antibiotics    • Terazosin Dizziness     Dizzy, blur vision, breathing problems    • Tramadol      Immunizations:  Immunization History   Administered Date(s) Administered   • Zostavax 08/23/2016     Social History:   Social History     Social History Narrative   • Not on file     Social History     Socioeconomic History   • Marital status:      Spouse name: Not on file   • Number of children: Not on file   • Years of education: Not on file   • Highest education level: Not on file   Tobacco Use   • Smoking status: Never Smoker   • Smokeless tobacco: Never Used   Substance and Sexual Activity   • Alcohol use: Never     Frequency: Never   • Drug use: Never       Family History:  Family History   Problem Relation Age of Onset   • Myelodysplastic syndrome Mother    • Other Father         Cardiovascular disorder   • Diabetes Other    • Hyperlipidemia Other    • Hypertension Other    • Malig Hyperthermia Neg Hx         Review of Systems  See history of present illness and past medical history.  Patient denies " "headache, dizziness, syncope, falls, trauma, change in vision, change in hearing, change in taste, changes in weight, changes in appetite, focal weakness, numbness, or paresthesia.  Patient denies chest pain, palpitations, dyspnea, orthopnea, PND, cough, sinus pressure, rhinorrhea, epistaxis, hemoptysis, nausea, vomiting,hematemesis, diarrhea, constipation or hematchezia.  Denies cold or heat intolerance, polydipsia, polyuria, polyphagia. Denies hematuria, pyuria, dysuria, hesitancy, frequency or urgency. Denies consumption of raw and under cooked meats foods or change in water source.  Denies fever, chills, sweats, night sweats.  Denies missing any routine medications. Remainder of ROS is negative.    Objective:  T Max 24 hrs: Temp (24hrs), Av.9 °F (36.6 °C), Min:97.7 °F (36.5 °C), Max:98 °F (36.7 °C)    Vitals Ranges:   Temp:  [97.7 °F (36.5 °C)-98 °F (36.7 °C)] 98 °F (36.7 °C)  Heart Rate:  [82-98] 93  Resp:  [16-18] 17  BP: (121-138)/(73-85) 134/73      Exam:  /73 (BP Location: Right arm, Patient Position: Lying)   Pulse 93   Temp 98 °F (36.7 °C) (Oral)   Resp 17   Ht 162.6 cm (64\")   Wt 74.8 kg (165 lb)   SpO2 99%   BMI 28.32 kg/m²     General Appearance:    Alert, cooperative, no distress, appears stated age   Head:    Normocephalic, without obvious abnormality, atraumatic   Eyes:    PERRL, conjunctivae/corneas clear, EOM's intact, both eyes   Ears:    Normal external ear canals, both ears   Nose:   Nares normal, septum midline, mucosa normal, no drainage    or sinus tenderness   Throat:   Lips, mucosa, and tongue normal   Neck:   Supple, symmetrical, trachea midline, no adenopathy;     thyroid:  no enlargement/tenderness/nodules; no carotid    bruit or JVD   Back:     Symmetric, no curvature, ROM normal, no CVA tenderness   Lungs:     Decreased breath sounds bilaterally, respirations unlabored   Chest Wall:    No tenderness or deformity    Heart:    Regular rate and rhythm, S1 and S2 normal, " no murmur, rub   or gallop   Abdomen:     Soft, nontender, bowel sounds active all four quadrants,     no masses, no hepatomegaly, no splenomegaly   Extremities:   Extremities normal, atraumatic, no cyanosis or edema   Pulses:   2+ and symmetric all extremities   Skin:   Skin color, texture, turgor normal, no rashes or lesions   Lymph nodes:   Cervical, supraclavicular, and axillary nodes normal   Neurologic:   CNII-XII intact, normal strength, sensation intact throughout      .    Data Review:  Labs in chart were reviewed.  WBC   Date Value Ref Range Status   02/21/2021 7.26 3.40 - 10.80 10*3/mm3 Final     Hemoglobin   Date Value Ref Range Status   02/21/2021 16.1 13.0 - 17.7 g/dL Final     Hematocrit   Date Value Ref Range Status   02/21/2021 48.8 37.5 - 51.0 % Final     Platelets   Date Value Ref Range Status   02/21/2021 234 140 - 450 10*3/mm3 Final     Sodium   Date Value Ref Range Status   02/21/2021 139 136 - 145 mmol/L Final     Potassium   Date Value Ref Range Status   02/21/2021 4.3 3.5 - 5.2 mmol/L Final     Chloride   Date Value Ref Range Status   02/21/2021 103 98 - 107 mmol/L Final     CO2   Date Value Ref Range Status   02/21/2021 23.2 22.0 - 29.0 mmol/L Final     BUN   Date Value Ref Range Status   02/21/2021 18 8 - 23 mg/dL Final     Creatinine   Date Value Ref Range Status   02/21/2021 0.84 0.76 - 1.27 mg/dL Final     Glucose   Date Value Ref Range Status   02/21/2021 144 (H) 65 - 99 mg/dL Final     Calcium   Date Value Ref Range Status   02/21/2021 9.6 8.6 - 10.5 mg/dL Final     AST (SGOT)   Date Value Ref Range Status   02/21/2021 20 1 - 40 U/L Final     ALT (SGPT)   Date Value Ref Range Status   02/21/2021 19 1 - 41 U/L Final     Alkaline Phosphatase   Date Value Ref Range Status   02/21/2021 24 (L) 39 - 117 U/L Final     No results found for: APTT, INR             Imaging Results (All)     Procedure Component Value Units Date/Time    CT Head Without Contrast [[id number]] Collected: 02/21/21 1353      Updated: 02/21/21 1440    Narrative:      CT HEAD WITHOUT CONTRAST     CLINICAL HISTORY: Dizziness.     CT scan of the head was obtained with 3 mm axial images. No intravenous  contrast was administered.     FINDINGS:     The ventricles, sulci, and cisterns are age appropriate. The basal  ganglia and thalami are unremarkable. The gray-white matter  differentiation is within normal limits. There are relatively mild  changes of chronic small vessel ischemic phenomena. There is a 7 x 2 mm  hypodensity within the right cerebellar hemisphere which could represent  enlarged perivascular space or an age-indeterminate PICA distribution  infarct.       Impression:         Mild changes of chronic small vessel ischemic phenomena are identified.  There is a subcentimeter hypodensity within the right cerebellar  hemisphere which could represent an enlarged perivascular space or a  tiny age-indeterminate right PICA distribution infarct. Otherwise, there  is no convincing evidence to suggest acute renal pathology on the CT.  Further evaluation could be performed with with MR imaging as clinically  indicated.     Findings were discussed with Dr. Castro on 02/21/2021 at approximately  1:47 PM.     Radiation dose reduction techniques were utilized, including automated  exposure control and exposure modulation based on body size.     This report was finalized on 2/21/2021 2:37 PM by Dr. Cosme Carroll M.D.           Patient Active Problem List   Diagnosis Code   • Acute bronchitis J20.9   • Acute pharyngitis J02.9   • Acute upper respiratory infection J06.9   • Allergic rhinitis J30.9   • Carpal tunnel syndrome G56.00   • Cough R05   • Non-insulin dependent type 2 diabetes mellitus (CMS/HCC) E11.9   • Alimentary obesity E66.9   • Fatigue R53.83   • Effusion of knee M25.469   • ED (erectile dysfunction) of organic origin N52.9   • Muscle ache M79.10   • Thumb pain M79.646   • Welcome to Medicare preventive visit Z00.00   • Mixed  hyperlipidemia E78.2   • Vitamin D deficiency E55.9   • Refusal of statin medication by patient Z53.29   • Medication management Z79.899   • Male hypogonadism E29.1   • Low testosterone R79.89   • Benign prostatic hyperplasia without lower urinary tract symptoms N40.0   • Hives L50.9   • Dizziness R42       Assessment:    Dizziness  diabetes  hyperlipidemia  Ataxia    Plan:  accu checks, insulin slding scale  Stroke workup  Neurology consult  Monitor on telemetry  D.w patient and ED Provider  Pt.ot and speech to see    Marybeth Drummond MD  2/21/2021  19:44 EST

## 2021-02-22 NOTE — CONSULTS
Acute rehab referral received via stroke order set. Patient with NIHSS 2. Will sign off.    Thank you!    Say Lou RN  p

## 2021-02-22 NOTE — SIGNIFICANT NOTE
02/22/21 1249   OTHER   Discipline speech language pathologist   Rehab Time/Intention   Session Not Performed other (see comments)  (Discussed with RN. Patient passed swallow screen and MRI is negative for acute infarct. SLP to s/o at this time, please re-consult as warranted. RN in agreement.)

## 2021-02-22 NOTE — PROGRESS NOTES
Discharge Planning Assessment  [facility]     Patient Name: Brian Garcia  MRN: [id number]  Today's Date: 2/22/2021    Admit Date: 2/21/2021    Discharge Needs Assessment     Row Name 02/22/21 1348       Living Environment    Lives With  spouse    Name(s) of Who Lives With Patient  Wife Amna    Current Living Arrangements  home/apartment/condo    Primary Care Provided by  self    Provides Primary Care For  no one    Family Caregiver if Needed  spouse    Able to Return to Prior Arrangements  yes       Resource/Environmental Concerns    Resource/Environmental Concerns  none    Transportation Concerns  car, none       Transition Planning    Patient/Family Anticipates Transition to  home with family    Patient/Family Anticipated Services at Transition  none    Transportation Anticipated  family or friend will provide       Discharge Needs Assessment    Readmission Within the Last 30 Days  no previous admission in last 30 days    Equipment Currently Used at Home  none    Concerns to be Addressed  no discharge needs identified;denies needs/concerns at this time    Anticipated Changes Related to Illness  none    Equipment Needed After Discharge  none        Discharge Plan     Row Name 02/22/21 1349       Plan    Plan  Home with spouse    Patient/Family in Agreement with Plan  yes    Plan Comments  Met with patient at the bedside. Explained CCP role and verified face sheet. Patient [place] at home in [place] with his wife Amna. He uses no DME and still drives. He has no previous history of SNF and no HH. Current with PCP and pharmacy. He plans to return home at [place] and his wife will provide transportation. Abby Servin RN        Continued Care and Services - Admitted Since 2/21/2021    Coordination has not been started for this encounter.       Expected Discharge Date and Time     Expected Discharge Date Expected Discharge Time    Feb 22, 2021         Demographic Summary     Row Name 02/22/21 1347       General  Information    Admission Type  observation    Arrived From  emergency department    Referral Source  admission list    Reason for Consult  discharge planning    Preferred Language  English        Functional Status     Row Name 02/22/21 1347       Functional Status    Usual Activity Tolerance  good    Current Activity Tolerance  good       Functional Status, IADL    Medications  independent    Meal Preparation  independent    Housekeeping  independent    Laundry  independent    Shopping  independent       Mental Status    General Appearance WDL  WDL       Mental Status Summary    Recent Changes in Mental Status/Cognitive Functioning  no changes        Psychosocial     Row Name 02/22/21 1347       Behavior WDL    Behavior WDL  WDL       Emotion Mood WDL    Emotion/Mood/Affect WDL  WDL       Speech WDL    Speech WDL  WDL       Perceptual State WDL    Perceptual State WDL  WDL       Thought Process WDL    Thought Process WDL  WDL       Intellectual Performance WDL    Intellectual Performance WDL  WDL    Level of Consciousness  Alert       Coping/Stress    Major Change/Loss/Stressor  none    Patient Personal Strengths  able to adapt    Sources of Support  spouse    Techniques to Morgan with Loss/Stress/Change  counseling    Reaction to Health Status  accepting    Understanding of Condition and Treatment  adequate understanding of medical condition;adequate understanding of treatment       Developmental Stage (Eriksson's)    Developmental Stage  Stage 8 (65 years-death/Late Adulthood) Integrity vs. Despair        Abuse/Neglect     Row Name 02/22/21 1348       Personal Safety    Feels Unsafe at Home or Work/School  no    Feels Threatened by Someone  no    Does Anyone Try to Keep You From Having Contact with Others or Doing Things Outside Your Home?  no    Physical Signs of Abuse Present  no        Legal    No documentation.       Substance Abuse    No documentation.       Patient Forms    No documentation.           Abby  JERO Servin

## 2021-02-23 ENCOUNTER — TRANSITIONAL CARE MANAGEMENT TELEPHONE ENCOUNTER (OUTPATIENT)
Dept: CALL CENTER | Facility: HOSPITAL | Age: 71
End: 2021-02-23

## 2021-02-23 NOTE — OUTREACH NOTE
Call Center TCM Note      Responses   [facility] patient discharged from?  [place]   Does the patient have one of the following disease processes/diagnoses(primary or secondary)?  Other   TCM attempt successful?  No   Unsuccessful attempts  Attempt 2          Amna Hagen MA    2/23/2021, 16:12 EST

## 2021-02-23 NOTE — PROGRESS NOTES
Case Management Discharge Note      Final Note: Patient DC'd home         Selected Continued Care - Discharged on 2/22/2021 Admission date: 2/21/2021 - Discharge disposition: Home or Self Care    Destination    No services have been selected for the patient.              Durable Medical Equipment    No services have been selected for the patient.              Dialysis/Infusion    No services have been selected for the patient.              Home Medical Care    No services have been selected for the patient.              Therapy    No services have been selected for the patient.              Community Resources    No services have been selected for the patient.                  Transportation Services  Private: Car    Final Discharge Disposition Code: 01 - home or self-care

## 2021-02-23 NOTE — OUTREACH NOTE
Call Center TCM Note      Responses   [facility] patient discharged from?  [place]   Does the patient have one of the following disease processes/diagnoses(primary or secondary)?  Other   TCM attempt successful?  No   Unsuccessful attempts  Attempt 1          Amna Hagen MA    2/23/2021, 13:25 EST

## 2021-02-23 NOTE — OUTREACH NOTE
Prep Survey      Responses   [facility] facility patient discharged from?  [place]   Is LACE score < 7 ?  Yes   Emergency Room discharge w/ pulse ox?  No   Eligibility  [facility]   Date of Admission  02/21/21   Date of Discharge  02/22/21   Discharge Disposition  Home or Self Care   Discharge diagnosis  Vertigo   Does the patient have one of the following disease processes/diagnoses(primary or secondary)?  Other   Does the patient have Home health ordered?  No   Is there a DME ordered?  No   Prep survey completed?  Yes          Liz Lock RN

## 2021-02-24 ENCOUNTER — TRANSITIONAL CARE MANAGEMENT TELEPHONE ENCOUNTER (OUTPATIENT)
Dept: CALL CENTER | Facility: HOSPITAL | Age: 71
End: 2021-02-24

## 2021-02-24 NOTE — OUTREACH NOTE
Call Center TCM Note      Responses   [facility] patient discharged from?  [place]   Does the patient have one of the following disease processes/diagnoses(primary or secondary)?  Other   TCM attempt successful?  Yes   Call start time  0951   Call end time  0955   Discharge diagnosis  Vertigo   Is patient permission given to speak with other caregiver?  No   Meds reviewed with patient/caregiver?  Yes   Is the patient having any side effects they believe may be caused by any medication additions or changes?  No   Does the patient have all medications ordered at discharge?  Yes   Is the patient taking all medications as directed (includes completed medication regime)?  Yes   Does the patient have a primary care provider?   Yes   Does the patient have an appointment with their PCP within 7 days of discharge?  Yes   Comments regarding PCP  PCP Dr Wolfe. Hospital follow up appt scheduled for tomorrow 2/25/21  145pm   Has the patient kept scheduled appointments due by today?  N/A   Has home health visited the patient within 72 hours of discharge?  N/A   Psychosocial issues?  No   Did the patient receive a copy of their discharge instructions?  Yes   Nursing interventions  Reviewed instructions with patient   What is the patient's perception of their health status since discharge?  Returned to baseline/stable   Is the patient/caregiver able to teach back signs and symptoms related to disease process for when to call PCP?  Yes   Is the patient/caregiver able to teach back signs and symptoms related to disease process for when to call 911?  Yes   Is the patient/caregiver able to teach back the hierarchy of who to call/visit for symptoms/problems? PCP, Specialist, Home health nurse, Urgent Care, ED, 911  Yes   If the patient is a current smoker, are they able to teach back resources for cessation?  Not a smoker   TCM call completed?  Yes          Amna Aguillon RN    2/24/2021, 09:55 EST

## 2021-02-25 ENCOUNTER — OFFICE VISIT (OUTPATIENT)
Dept: FAMILY MEDICINE CLINIC | Facility: CLINIC | Age: 71
End: 2021-02-25

## 2021-02-25 VITALS
OXYGEN SATURATION: 99 % | DIASTOLIC BLOOD PRESSURE: 60 MMHG | TEMPERATURE: 97.1 F | HEART RATE: 84 BPM | HEIGHT: 64 IN | BODY MASS INDEX: 28.48 KG/M2 | SYSTOLIC BLOOD PRESSURE: 110 MMHG | WEIGHT: 166.8 LBS

## 2021-02-25 DIAGNOSIS — E11.9 NON-INSULIN DEPENDENT TYPE 2 DIABETES MELLITUS (HCC): ICD-10-CM

## 2021-02-25 DIAGNOSIS — E78.2 MIXED HYPERLIPIDEMIA: ICD-10-CM

## 2021-02-25 DIAGNOSIS — E55.9 VITAMIN D DEFICIENCY, UNSPECIFIED: ICD-10-CM

## 2021-02-25 DIAGNOSIS — G45.9 TIA (TRANSIENT ISCHEMIC ATTACK): Primary | ICD-10-CM

## 2021-02-25 PROBLEM — I36.1 NONRHEUMATIC TRICUSPID VALVE REGURGITATION: Status: ACTIVE | Noted: 2021-02-25

## 2021-02-25 PROBLEM — C67.9 BLADDER CANCER (HCC): Status: ACTIVE | Noted: 2021-02-25

## 2021-02-25 PROCEDURE — 99496 TRANSJ CARE MGMT HIGH F2F 7D: CPT | Performed by: INTERNAL MEDICINE

## 2021-02-25 RX ORDER — SEMAGLUTIDE 1.34 MG/ML
INJECTION, SOLUTION SUBCUTANEOUS
COMMUNITY
Start: 2021-02-03

## 2021-02-25 RX ORDER — ACYCLOVIR 400 MG/1
TABLET ORAL AS NEEDED
COMMUNITY
Start: 2021-01-14

## 2021-02-25 NOTE — PROGRESS NOTES
Subjective   Brian Garcia is a 70 y.o. male.     Vitals:    02/25/21 1345   BP: 110/60   Pulse: 84   Temp: 97.1 °F (36.2 °C)   SpO2: 99%      Body mass index is 28.63 kg/m².     History of Present Illness   Current outpatient and discharge medications have been reconciled for the patient.  Reviewed by: Andrey Wolfe MD  Patient was seen for hospital follow-up.  Patient was seen for TIA.  Patient started developing slurred speech and severe dizziness.  Patient was taken to [facility] emergency room and diagnosed with a TIA.  Patient did have extensive CT and CTA scans.  Patient not show signs of any acute cerebral infarct but did show a less than 40% stenosis of the carotid artery.  Patient was placed on atorvastatin 40 mg daily with aspirin 81 mg daily.  Patient's blood sugars been running in the 130s.  Patient's lipids will be treated with medication.  Patient had a low vitamin D3 was put on 50,000 units daily.  Patient will monitor blood pressure and blood sugar follow-up in 1 month.    Dictated utilizing Dragon dictation. If there are questions or for further clarification, please contact me.  The following portions of the patient's history were reviewed and updated as appropriate: allergies, current medications, past family history, past medical history, past social history, past surgical history and problem list.    Review of Systems   Constitutional: Negative for fatigue and fever.   HENT: Positive for congestion. Negative for trouble swallowing.         Slurred speech   Eyes: Negative for discharge and visual disturbance.   Respiratory: Negative for choking and shortness of breath.    Cardiovascular: Negative for chest pain and palpitations.   Gastrointestinal: Negative for abdominal pain and blood in stool.   Endocrine: Negative.    Genitourinary: Negative for genital sores and hematuria.   Musculoskeletal: Negative for gait problem and joint swelling.   Skin: Negative for color change, pallor,  rash and wound.   Allergic/Immunologic: Positive for environmental allergies. Negative for immunocompromised state.   Neurological: Positive for dizziness. Negative for facial asymmetry and speech difficulty.   Psychiatric/Behavioral: Negative for hallucinations and suicidal ideas.       Objective   Physical Exam  Vitals signs and nursing note reviewed.   Constitutional:       Appearance: Normal appearance. He is well-developed.   HENT:      Head: Normocephalic and atraumatic.      Nose: Nose normal.      Mouth/Throat:      Mouth: Mucous membranes are moist.      Pharynx: Oropharynx is clear.   Eyes:      Extraocular Movements: Extraocular movements intact.      Conjunctiva/sclera: Conjunctivae normal.      Pupils: Pupils are equal, round, and reactive to light.   Neck:      Musculoskeletal: Normal range of motion and neck supple.   Cardiovascular:      Rate and Rhythm: Normal rate and regular rhythm.      Heart sounds: Normal heart sounds. No murmur. No friction rub. No gallop.    Pulmonary:      Effort: Pulmonary effort is normal. No respiratory distress.      Breath sounds: Normal breath sounds. No stridor. No wheezing, rhonchi or rales.   Chest:      Chest wall: No tenderness.   Abdominal:      General: Bowel sounds are normal.      Palpations: Abdomen is soft.   Musculoskeletal: Normal range of motion.         General: No swelling or tenderness.   Skin:     General: Skin is warm and dry.   Neurological:      General: No focal deficit present.      Mental Status: He is alert and oriented to person, place, and time. Mental status is at baseline.      Cranial Nerves: No cranial nerve deficit.      Sensory: No sensory deficit.      Coordination: Coordination normal.      Gait: Gait normal.   Psychiatric:         Mood and Affect: Mood normal.         Behavior: Behavior normal.         Thought Content: Thought content normal.         Judgment: Judgment normal.         Assessment/Plan #1 continue monitoring blood  pressure and blood sugar #2 labs #3 follow-up in 1 month  Problems Addressed this Visit     Cardiac and Vasculature              Mixed hyperlipidemia    Relevant Orders    CBC (No Diff)    Comprehensive Metabolic Panel    Lipid Panel    Vitamin D 25 Hydroxy          Endocrine and Metabolic              Non-insulin dependent type 2 diabetes mellitus (CMS/HCC)    Relevant Medications    Ozempic, 1 MG/DOSE, 2 MG/1.5ML solution pen-injector    Other Relevant Orders    CBC (No Diff)    Comprehensive Metabolic Panel    Lipid Panel    Vitamin D 25 Hydroxy          Neuro              TIA (transient ischemic attack) - Primary    Relevant Orders    CBC (No Diff)    Comprehensive Metabolic Panel    Lipid Panel    Vitamin D 25 Hydroxy            Other Visit Diagnoses     Vitamin D deficiency, unspecified         Relevant Orders    Vitamin D 25 Hydroxy      Diagnoses     Diagnosis Codes Comments    TIA (transient ischemic attack)    -  Primary ICD-10-CM: G45.9  ICD-9-CM: 435.9     Mixed hyperlipidemia     ICD-10-CM: E78.2  ICD-9-CM: 272.2     Non-insulin dependent type 2 diabetes mellitus (CMS/HCC)     ICD-10-CM: E11.9  ICD-9-CM: 250.00     Vitamin D deficiency, unspecified      ICD-10-CM: E55.9  ICD-9-CM: 268.9

## 2021-03-03 ENCOUNTER — OFFICE VISIT (OUTPATIENT)
Dept: CARDIOLOGY | Facility: CLINIC | Age: 71
End: 2021-03-03

## 2021-03-03 VITALS
BODY MASS INDEX: 28 KG/M2 | WEIGHT: 164 LBS | HEART RATE: 78 BPM | SYSTOLIC BLOOD PRESSURE: 152 MMHG | DIASTOLIC BLOOD PRESSURE: 76 MMHG | HEIGHT: 64 IN

## 2021-03-03 DIAGNOSIS — G45.9 TIA (TRANSIENT ISCHEMIC ATTACK): Primary | ICD-10-CM

## 2021-03-03 PROCEDURE — 99204 OFFICE O/P NEW MOD 45 MIN: CPT | Performed by: INTERNAL MEDICINE

## 2021-03-03 PROCEDURE — 93000 ELECTROCARDIOGRAM COMPLETE: CPT | Performed by: INTERNAL MEDICINE

## 2021-03-03 NOTE — PROGRESS NOTES
Brian Garcia  1950  Date of Office Visit: 03/03/21  Encounter Provider: Moody Hardy MD  Place of Service: [facility] CARDIOLOGY      CHIEF COMPLAINT:  Abnormal echocardiogram  Vertigo/dizziness     HISTORY OF PRESENT ILLNESS:  I had the pleasure of seeing the patient in consultation. He is a very pleasant 70-year-old male with a recent hospital admission about a week ago with complaints of dizziness and slurred speech. Reportedly he was initially working under a 5th wheel and when he got up he had the sensation of disequilibrium. He denied any spinning of the room but stated that he felt like he had no balance. These symptoms slowly went away, but he had recurrence when rising from bed and came in for evaluation. He had a CT scan of the head showing a questionable subcentimeter hypodensity in the right cerebellar hemisphere and he underwent MRI with no significant findings other than chronic small vessel ischemic changes. He had mild carotid artery plaque. He was started on aspirin and statin and subsequently had an echocardiogram as well. His echocardiogram for the most part he looks normal. He has mild LVH documented and trivial tricuspid valve regurgitation but no other significant problems. He denies any recurrence of these symptoms since his discharge.         Review of Systems   Constitution: Negative for fever and malaise/fatigue.   HENT: Negative for nosebleeds and sore throat.    Eyes: Negative for blurred vision and double vision.   Cardiovascular: Negative for chest pain, claudication, palpitations and syncope.   Respiratory: Negative for cough, shortness of breath and snoring.    Endocrine: Negative for cold intolerance, heat intolerance and polydipsia.   Skin: Negative for itching, poor wound healing and rash.   Musculoskeletal: Negative for joint pain, joint swelling, muscle weakness and myalgias.   Gastrointestinal: Negative for abdominal pain, melena,  "nausea and vomiting.   Neurological: Positive for dizziness and vertigo. Negative for light-headedness, loss of balance, seizures and weakness.   Psychiatric/Behavioral: Negative for altered mental status and depression.          Past Medical History:   Diagnosis Date   • Bladder cancer (CMS/HCC) 2/25/2021   • Bladder tumor    • Diabetes mellitus (CMS/HCC)    • History of asthma    • History of cardiac murmur as a child    • History of skin cancer    • Hyperlipidemia    • Nonrheumatic tricuspid valve regurgitation 2/25/2021   • TIA (transient ischemic attack) 2/25/2021   • Type 2 diabetes mellitus (CMS/HCC)        The following portions of the patient's history were reviewed and updated as appropriate: Social history , Family history and Surgical history     Current Outpatient Medications on File Prior to Visit   Medication Sig Dispense Refill   • Accu-Chek Softclix Lancets lancets Use as instructed.     • acyclovir (ZOVIRAX) 400 MG tablet      • aspirin (aspirin) 81 MG EC tablet Take 1 tablet by mouth Daily. 30 tablet 0   • atorvastatin (LIPITOR) 40 MG tablet Take 2 tablets by mouth Every Night. 90 tablet 0   • Empagliflozin 25 MG tablet Take 25 mg by mouth Every Morning.     • glucose blood (Accu-Chek Lisa Plus) test strip 1 strip by Other route 3 (Three) Times a Day.     • Ozempic, 1 MG/DOSE, 2 MG/1.5ML solution pen-injector      • vitamin D (ERGOCALCIFEROL) 1.25 MG (95112 UT) capsule capsule Take 50,000 Units by mouth Every 7 (Seven) Days. Takes on Thursdays     • [DISCONTINUED] B Complex-C-E-Zn (b complex-C-E-zinc) tablet Take 1 tablet by mouth Daily. Thursdays     • [DISCONTINUED] Multiple Vitamins-Minerals (MULTIVITAL-M PO) Take 1 tablet by mouth Daily.     • [DISCONTINUED] Needle, Disp, 22G X 1\" misc 1 inj weekly 12 each 4   • [DISCONTINUED] Syringe 18G X 1-1/2\" 3 ML misc 1 inj every week 12 each 4     No current facility-administered medications on file prior to visit.        Allergies   Allergen Reactions " "  • Codeine    • Doxycycline Hives and Itching   • Invokana [Canagliflozin]      Caused floaters in both eyes    • Metformin And Related GI Intolerance     GI   • Penicillins    • Sulfa Antibiotics    • Terazosin Dizziness     Dizzy, blur vision, breathing problems    • Tramadol        Vitals:    03/03/21 1511   BP: 152/76   Pulse: 78   Weight: 74.4 kg (164 lb)   Height: 162.6 cm (64\")     Body mass index is 28.15 kg/m².   Constitutional:       Appearance: Well-developed.   Eyes:      General: No scleral icterus.     Conjunctiva/sclera: Conjunctivae normal.   HENT:      Head: Normocephalic and atraumatic.   Neck:      Musculoskeletal: Normal range of motion and neck supple.      Thyroid: No thyromegaly.      Vascular: Normal carotid pulses. No carotid bruit, hepatojugular reflux or JVD.      Trachea: No tracheal deviation.   Pulmonary:      Effort: No respiratory distress.      Breath sounds: Normal breath sounds. No decreased breath sounds. No wheezing. No rhonchi. No rales.   Chest:      Chest wall: Not tender to palpatation.   Cardiovascular:      Normal rate. Regular rhythm.      No gallop.   Pulses:     Carotid: 2+ bilaterally.     Radial: 2+ bilaterally.     Femoral: 2+ bilaterally.     Dorsalis pedis: 2+ bilaterally.     Posterior tibial: 2+ bilaterally.  Edema:     Peripheral edema absent.   Abdominal:      General: Bowel sounds are normal. There is no distension.      Palpations: Abdomen is soft.      Tenderness: There is no abdominal tenderness. There is no rebound.   Musculoskeletal:         General: No deformity.   Skin:     Findings: No erythema or rash.   Neurological:      Mental Status: Alert and oriented to person, place, and time.      Sensory: No sensory deficit.   Psychiatric:         Behavior: Behavior normal.            Lab Results   Component Value Date    WBC 6.30 02/22/2021    HGB 15.2 02/22/2021    HCT 46.3 02/22/2021    MCV 84.6 02/22/2021     02/22/2021       Lab Results "   Component Value Date    GLUCOSE 81 02/22/2021    BUN 16 02/22/2021    CREATININE 0.93 02/22/2021    EGFRIFNONA 81 02/22/2021    EGFRIFAFRI 80 09/30/2020    BCR 17.2 02/22/2021    K 3.9 02/22/2021    CO2 23.1 02/22/2021    CALCIUM 8.0 (L) 02/22/2021    PROTENTOTREF 6.3 09/30/2020    ALBUMIN 3.20 (L) 02/22/2021    LABIL2 1.4 01/21/2021    AST 12 02/22/2021    ALT 16 02/22/2021       Lab Results   Component Value Date    GLUCOSE 81 02/22/2021    CALCIUM 8.0 (L) 02/22/2021     02/22/2021    K 3.9 02/22/2021    CO2 23.1 02/22/2021     (H) 02/22/2021    BUN 16 02/22/2021    CREATININE 0.93 02/22/2021    EGFRIFAFRI 80 09/30/2020    EGFRIFNONA 81 02/22/2021    BCR 17.2 02/22/2021    ANIONGAP 8.9 02/22/2021       Lab Results   Component Value Date    CHOL 127 02/22/2021    CHLPL 183 09/30/2020    TRIG 175 (H) 02/22/2021    HDL 34 (L) 02/22/2021    LDL 63 02/22/2021         ECG 12 Lead    Date/Time: 3/3/2021 3:33 PM  Performed by: Moody Hardy MD  Authorized by: Moody Hardy MD   Comparison: compared with previous ECG from 1/27/2020  Similar to previous ECG  Rhythm: sinus rhythm  Rate: normal  QRS axis: normal    Clinical impression: normal ECG             Results for orders placed during the hospital encounter of 02/21/21   Adult transthoracic echo complete    Narrative · Calculated left ventricular EF = 55.4% Estimated left ventricular EF =   55% Estimated left ventricular EF was in agreement with the calculated   left ventricular EF. Left ventricular systolic function is normal. Normal   left ventricular cavity size noted. Left ventricular wall thickness is   consistent with mild concentric hypertrophy. All left ventricular wall   segments contract normally. Left ventricular diastolic function is   consistent with (grade I) impaired relaxation.  · Left atrial volume is normal. Saline test results are negative.  · Trace tricuspid valve regurgitation is present. Insufficient TR velocity    profile to estimate the right ventricular systolic pressure.          DISCUSSION/SUMMARY    A very pleasant 70-year-old male who presents to me as a consult after recent hospitalization. He had dizziness and dysequilibrium and was felt to either have had a posterior circulation TIA or BVP. He has no recurrence of those symptoms. He has no evidence of orthostasis and his blood pressure has actually been very well controlled other than today.      1.   Possible posterior circulation TIA.  - Recommend getting him set up for a 2 week Zio patch and hopefully be able to effectively rule out or decrease the potential of him having atrial fibrillation contributing to possible posterior circulation TIA.   - Echocardiogram, in my opinion, looks fine and normal for age.     I would recommend continuing his current statin therapy along with aspirin. He is tolerating this well with no myalgias.     Diabetic management per primary. His hemoglobin A1c was not quite at goal on the lab work that I reviewed from 02/22/021. Defer to Endocrinology.       Advance Care Planning   ACP discussion was held with the patient during this visit. Patient has an advance directive (not in EMR), copy requested.

## 2021-03-19 ENCOUNTER — BULK ORDERING (OUTPATIENT)
Dept: CASE MANAGEMENT | Facility: OTHER | Age: 71
End: 2021-03-19

## 2021-03-19 DIAGNOSIS — Z23 IMMUNIZATION DUE: ICD-10-CM

## 2021-03-29 RX ORDER — ATORVASTATIN CALCIUM 40 MG/1
80 TABLET, FILM COATED ORAL NIGHTLY
Qty: 90 TABLET | Refills: 1 | Status: SHIPPED | OUTPATIENT
Start: 2021-03-29 | End: 2021-06-29

## 2021-04-15 ENCOUNTER — TELEPHONE (OUTPATIENT)
Dept: CARDIOLOGY | Facility: CLINIC | Age: 71
End: 2021-04-15

## 2021-04-15 NOTE — TELEPHONE ENCOUNTER
Moody Hardy MD Foster, Danielle Nicole, MA  No   No evidence of afib           Holter Monitor - 72 Hour Up To 15 Days  Order: [id number]  Status:  Final result   Visible to patient:  Yes (MyChart) Dx:  TIA (transient ischemic attack)  Details

## 2021-04-26 RX ORDER — ERGOCALCIFEROL 1.25 MG/1
CAPSULE ORAL
Qty: 4 CAPSULE | Refills: 5 | Status: SHIPPED | OUTPATIENT
Start: 2021-04-26 | End: 2022-07-06 | Stop reason: HOSPADM

## 2021-04-27 ENCOUNTER — OFFICE VISIT (OUTPATIENT)
Dept: FAMILY MEDICINE CLINIC | Facility: CLINIC | Age: 71
End: 2021-04-27

## 2021-04-27 VITALS
OXYGEN SATURATION: 97 % | HEIGHT: 64 IN | TEMPERATURE: 96.9 F | SYSTOLIC BLOOD PRESSURE: 110 MMHG | BODY MASS INDEX: 27.35 KG/M2 | HEART RATE: 80 BPM | WEIGHT: 160.2 LBS | DIASTOLIC BLOOD PRESSURE: 60 MMHG

## 2021-04-27 DIAGNOSIS — N40.0 BENIGN PROSTATIC HYPERPLASIA WITHOUT LOWER URINARY TRACT SYMPTOMS: ICD-10-CM

## 2021-04-27 DIAGNOSIS — E11.9 NON-INSULIN DEPENDENT TYPE 2 DIABETES MELLITUS (HCC): ICD-10-CM

## 2021-04-27 DIAGNOSIS — E78.2 MIXED HYPERLIPIDEMIA: Primary | ICD-10-CM

## 2021-04-27 PROCEDURE — 99214 OFFICE O/P EST MOD 30 MIN: CPT | Performed by: INTERNAL MEDICINE

## 2021-04-27 RX ORDER — MULTIPLE VITAMINS W/ MINERALS TAB 9MG-400MCG
1 TAB ORAL DAILY
COMMUNITY
End: 2022-07-06

## 2021-04-27 RX ORDER — UBIDECARENONE 50 MG
CAPSULE ORAL DAILY
COMMUNITY
End: 2022-07-06 | Stop reason: HOSPADM

## 2021-04-27 NOTE — PROGRESS NOTES
Subjective   Brian Garcia is a 70 y.o. male.     Vitals:    04/27/21 1325   BP: 110/60   Pulse: 80   Temp: 96.9 °F (36.1 °C)   SpO2: 97%      Body mass index is 27.5 kg/m².     History of Present Illness   Patient was seen for hyperlipidemia.  Patient is using diet exercise and atorvastatin 40 mg daily.  Triglycerides 175, HDL 34, LDL 63.  Patient will continue present treatment.  Patient's blood sugars been running 130s at home.  Patient's hemoglobin A1c was 6.9% 2 months ago.  Patient will continue with empagliflozin 25 mg daily, Ozempic weekly.  Patient does have BPH but he is not having any problems at the present time.  Patient does not need medication at present.  Patient will continue present diet and activity levels and continue to monitor blood sugars.  Patient will have his yearly eye exam and follow-up in 6 months.    Dictated utilizing Dragon dictation. If there are questions or for further clarification, please contact me.  The following portions of the patient's history were reviewed and updated as appropriate: allergies, current medications, past family history, past medical history, past social history, past surgical history and problem list.    Review of Systems   Constitutional: Negative for fatigue and fever.   HENT: Positive for congestion. Negative for trouble swallowing.    Eyes: Negative for discharge and visual disturbance.   Respiratory: Negative for choking and shortness of breath.    Cardiovascular: Negative for chest pain and palpitations.   Gastrointestinal: Negative for abdominal pain and blood in stool.   Endocrine: Negative.    Genitourinary: Negative for genital sores and hematuria.   Musculoskeletal: Negative for gait problem and joint swelling.   Skin: Negative for color change, pallor, rash and wound.   Allergic/Immunologic: Positive for environmental allergies. Negative for immunocompromised state.   Neurological: Negative for facial asymmetry and speech difficulty.    Psychiatric/Behavioral: Negative for hallucinations and suicidal ideas.       Objective   Physical Exam  Vitals and nursing note reviewed.   Constitutional:       Appearance: Normal appearance. He is well-developed.   HENT:      Head: Normocephalic and atraumatic.      Nose: Nose normal.      Mouth/Throat:      Mouth: Mucous membranes are moist.      Pharynx: Oropharynx is clear.   Eyes:      Extraocular Movements: Extraocular movements intact.      Conjunctiva/sclera: Conjunctivae normal.      Pupils: Pupils are equal, round, and reactive to light.   Cardiovascular:      Rate and Rhythm: Normal rate and regular rhythm.      Heart sounds: Normal heart sounds. No murmur heard.   No friction rub. No gallop.    Pulmonary:      Effort: Pulmonary effort is normal. No respiratory distress.      Breath sounds: Normal breath sounds. No stridor. No wheezing, rhonchi or rales.   Chest:      Chest wall: No tenderness.   Abdominal:      General: Bowel sounds are normal.      Palpations: Abdomen is soft.   Musculoskeletal:         General: Normal range of motion.      Cervical back: Normal range of motion and neck supple.   Skin:     General: Skin is warm and dry.   Neurological:      General: No focal deficit present.      Mental Status: He is alert and oriented to person, place, and time. Mental status is at baseline.   Psychiatric:         Mood and Affect: Mood normal.         Behavior: Behavior normal.         Thought Content: Thought content normal.         Judgment: Judgment normal.         Assessment/Plan #1 continue present diet and activity levels 2 continue to monitor blood sugar #3 continue present lipid treatment  Problems Addressed this Visit     Cardiac and Vasculature            Mixed hyperlipidemia - Primary          Endocrine and Metabolic            Non-insulin dependent type 2 diabetes mellitus (CMS/HCC)          Genitourinary and Reproductive             Benign prostatic hyperplasia without lower urinary  tract symptoms            Diagnoses     Diagnosis Codes Comments    Mixed hyperlipidemia    -  Primary ICD-10-CM: E78.2  ICD-9-CM: 272.2     Non-insulin dependent type 2 diabetes mellitus (CMS/[facility])     ICD-10-CM: E11.9  ICD-9-CM: 250.00     Benign prostatic hyperplasia without lower urinary tract symptoms     ICD-10-CM: N40.0  ICD-9-CM: 600.00

## 2021-06-29 RX ORDER — ATORVASTATIN CALCIUM 40 MG/1
TABLET, FILM COATED ORAL
Qty: 90 TABLET | Refills: 1 | Status: SHIPPED | OUTPATIENT
Start: 2021-06-29 | End: 2021-07-19 | Stop reason: SDUPTHER

## 2021-07-19 ENCOUNTER — TELEPHONE (OUTPATIENT)
Dept: FAMILY MEDICINE CLINIC | Facility: CLINIC | Age: 71
End: 2021-07-19

## 2021-07-19 RX ORDER — ATORVASTATIN CALCIUM 40 MG/1
40 TABLET, FILM COATED ORAL NIGHTLY
Qty: 90 TABLET | Refills: 1 | Status: SHIPPED | OUTPATIENT
Start: 2021-07-19 | End: 2021-07-23 | Stop reason: ALTCHOICE

## 2021-07-19 NOTE — TELEPHONE ENCOUNTER
Caller: Brian Garcia    Relationship to patient: Self    Best call back number: [phone]    Patient is needing: WAKING UP WITH STOMACH ACHE IN THE MIDDLE OF THE NIGHT, NOT RESTING WELL.  SYMPTOMS STARTED WHEN HE WAS PRESCRIBED atorvastatin (LIPITOR) 40 MG tablet.  IS THERE A DIFFERENT DRUG THAT HE CAN TRY? DO YOU HAVE ANY SAMPLES IN OFFICE THAT HE COULD  TO TRY NEW MEDICATION.  PLEASE CALL PATIENT TO DISCUSS

## 2021-07-23 RX ORDER — PRAVASTATIN SODIUM 40 MG
40 TABLET ORAL NIGHTLY
Qty: 90 TABLET | Refills: 1 | Status: SHIPPED | OUTPATIENT
Start: 2021-07-23 | End: 2021-10-11

## 2021-07-23 NOTE — TELEPHONE ENCOUNTER
PATIENT CALLED IN STATING HE HAS NOT HEARD ANYTHING YET FROM DR. HAMILTON. WAS CALLING TO GET AN UPDATE. PLEASE CALL AND ADVISE. THANK YOU

## 2021-08-24 ENCOUNTER — TELEPHONE (OUTPATIENT)
Dept: FAMILY MEDICINE CLINIC | Facility: CLINIC | Age: 71
End: 2021-08-24

## 2021-08-24 DIAGNOSIS — E11.9 NON-INSULIN DEPENDENT TYPE 2 DIABETES MELLITUS (HCC): Primary | ICD-10-CM

## 2021-08-24 NOTE — TELEPHONE ENCOUNTER
Pt was told to come in 30 days after taking pravastatin (Pravachol) 40 MG tablet to recheck labs. Pt needs orders. Has appt tomorrow.

## 2021-08-25 ENCOUNTER — LAB (OUTPATIENT)
Dept: FAMILY MEDICINE CLINIC | Facility: CLINIC | Age: 71
End: 2021-08-25

## 2021-08-25 DIAGNOSIS — E11.9 NON-INSULIN DEPENDENT TYPE 2 DIABETES MELLITUS (HCC): ICD-10-CM

## 2021-08-25 DIAGNOSIS — E78.2 MIXED HYPERLIPIDEMIA: ICD-10-CM

## 2021-08-25 DIAGNOSIS — G45.9 TIA (TRANSIENT ISCHEMIC ATTACK): ICD-10-CM

## 2021-08-25 LAB
25(OH)D3 SERPL-MCNC: 62.5 NG/ML (ref 30–100)
ALBUMIN SERPL-MCNC: 4.2 G/DL (ref 3.5–5.2)
ALBUMIN/GLOB SERPL: 1.6 G/DL
ALP SERPL-CCNC: 32 U/L (ref 39–117)
ALT SERPL W P-5'-P-CCNC: 29 U/L (ref 1–41)
ANION GAP SERPL CALCULATED.3IONS-SCNC: 9.6 MMOL/L (ref 5–15)
AST SERPL-CCNC: 21 U/L (ref 1–40)
BILIRUB SERPL-MCNC: 0.5 MG/DL (ref 0–1.2)
BUN SERPL-MCNC: 22 MG/DL (ref 8–23)
BUN/CREAT SERPL: 21.8 (ref 7–25)
CALCIUM SPEC-SCNC: 9.3 MG/DL (ref 8.6–10.5)
CHLORIDE SERPL-SCNC: 105 MMOL/L (ref 98–107)
CHOLEST SERPL-MCNC: 110 MG/DL (ref 0–200)
CO2 SERPL-SCNC: 25.4 MMOL/L (ref 22–29)
CREAT SERPL-MCNC: 1.01 MG/DL (ref 0.76–1.27)
DEPRECATED RDW RBC AUTO: 38.9 FL (ref 37–54)
ERYTHROCYTE [DISTWIDTH] IN BLOOD BY AUTOMATED COUNT: 12.6 % (ref 12.3–15.4)
GFR SERPL CREATININE-BSD FRML MDRD: 73 ML/MIN/1.73
GLOBULIN UR ELPH-MCNC: 2.6 GM/DL
GLUCOSE SERPL-MCNC: 108 MG/DL (ref 65–99)
HBA1C MFR BLD: 6.5 % (ref 4.8–5.6)
HCT VFR BLD AUTO: 46.2 % (ref 37.5–51)
HDLC SERPL-MCNC: 47 MG/DL (ref 40–60)
HGB BLD-MCNC: 15 G/DL (ref 13–17.7)
LDLC SERPL CALC-MCNC: 48 MG/DL (ref 0–100)
LDLC/HDLC SERPL: 1.04 {RATIO}
MCH RBC QN AUTO: 28 PG (ref 26.6–33)
MCHC RBC AUTO-ENTMCNC: 32.5 G/DL (ref 31.5–35.7)
MCV RBC AUTO: 86.2 FL (ref 79–97)
PLATELET # BLD AUTO: 232 10*3/MM3 (ref 140–450)
PMV BLD AUTO: 10.2 FL (ref 6–12)
POTASSIUM SERPL-SCNC: 4.8 MMOL/L (ref 3.5–5.2)
PROT SERPL-MCNC: 6.8 G/DL (ref 6–8.5)
RBC # BLD AUTO: 5.36 10*6/MM3 (ref 4.14–5.8)
SODIUM SERPL-SCNC: 140 MMOL/L (ref 136–145)
TRIGL SERPL-MCNC: 70 MG/DL (ref 0–150)
VLDLC SERPL-MCNC: 15 MG/DL (ref 5–40)
WBC # BLD AUTO: 5.44 10*3/MM3 (ref 3.4–10.8)

## 2021-08-25 PROCEDURE — 80061 LIPID PANEL: CPT | Performed by: INTERNAL MEDICINE

## 2021-08-25 PROCEDURE — 85027 COMPLETE CBC AUTOMATED: CPT | Performed by: INTERNAL MEDICINE

## 2021-08-25 PROCEDURE — 83036 HEMOGLOBIN GLYCOSYLATED A1C: CPT | Performed by: INTERNAL MEDICINE

## 2021-08-25 PROCEDURE — 36415 COLL VENOUS BLD VENIPUNCTURE: CPT | Performed by: INTERNAL MEDICINE

## 2021-08-25 PROCEDURE — 80053 COMPREHEN METABOLIC PANEL: CPT | Performed by: INTERNAL MEDICINE

## 2021-08-25 PROCEDURE — 82306 VITAMIN D 25 HYDROXY: CPT | Performed by: INTERNAL MEDICINE

## 2021-08-26 ENCOUNTER — TELEPHONE (OUTPATIENT)
Dept: FAMILY MEDICINE CLINIC | Facility: CLINIC | Age: 71
End: 2021-08-26

## 2021-08-26 NOTE — TELEPHONE ENCOUNTER
Caller: Brian Garcia    Relationship: Self    Best call back number: [phone]    Caller requesting test results: PATIENT    What test was performed: BLOOD WORK    When was the test performed: 08/25/2021    Where was the test performed: OFFICE    Additional notes: PATIENT WOULD LIKE THESE RESULTS SENT TO HIS ENDOCRINOLOGIST, BALTA BETH [address] [place] [address].  PHONE NUMBER [phone]

## 2021-10-11 RX ORDER — PRAVASTATIN SODIUM 40 MG
TABLET ORAL
Qty: 90 TABLET | Refills: 1 | Status: SHIPPED | OUTPATIENT
Start: 2021-10-11 | End: 2022-07-05

## 2021-10-27 ENCOUNTER — OFFICE VISIT (OUTPATIENT)
Dept: FAMILY MEDICINE CLINIC | Facility: CLINIC | Age: 71
End: 2021-10-27

## 2021-10-27 VITALS
WEIGHT: 164 LBS | TEMPERATURE: 97.1 F | OXYGEN SATURATION: 100 % | HEIGHT: 64 IN | BODY MASS INDEX: 28 KG/M2 | SYSTOLIC BLOOD PRESSURE: 110 MMHG | HEART RATE: 87 BPM | DIASTOLIC BLOOD PRESSURE: 70 MMHG

## 2021-10-27 DIAGNOSIS — E55.9 VITAMIN D DEFICIENCY: ICD-10-CM

## 2021-10-27 DIAGNOSIS — Z12.11 COLON CANCER SCREENING: ICD-10-CM

## 2021-10-27 DIAGNOSIS — E11.9 NON-INSULIN DEPENDENT TYPE 2 DIABETES MELLITUS (HCC): Primary | ICD-10-CM

## 2021-10-27 DIAGNOSIS — E78.2 MIXED HYPERLIPIDEMIA: ICD-10-CM

## 2021-10-27 PROCEDURE — 99214 OFFICE O/P EST MOD 30 MIN: CPT | Performed by: INTERNAL MEDICINE

## 2021-10-27 NOTE — PROGRESS NOTES
Subjective   Brian Garcia is a 71 y.o. male.     Vitals:    10/27/21 1301   BP: 110/70   Pulse: 87   Temp: 97.1 °F (36.2 °C)   SpO2: 100%      Body mass index is 28.14 kg/m².     History of Present Illness   Patient was seen for diabetes.  Patient's blood sugars been running 130s.  Patient's hemoglobin A1c was 6.5%.  Patient will continue his present diet and activity levels.  Patient will continue monitoring blood sugar.  Patient's lipids treated with diet exercise and pravastatin 40 mg daily.  Triglycerides 175, HDL 34, LDL 63.  Patient will continue his present diet and activity levels.  Patient does have vitamin D3 deficiency.  Patient's vitamin D level was 62.5.  Patient is using 50,000 units weekly of D3.  Patient will continue present diet and activity levels.  Patient will continue monitoring blood sugar at home.  Patient will follow up in 6 months.    Dictated utilizing Dragon dictation. If there are questions or for further clarification, please contact me.  The following portions of the patient's history were reviewed and updated as appropriate: allergies, current medications, past family history, past medical history, past social history, past surgical history and problem list.    Review of Systems   Constitutional: Negative for fatigue and fever.   HENT: Positive for congestion. Negative for trouble swallowing.    Eyes: Negative for discharge and visual disturbance.   Respiratory: Negative for choking and shortness of breath.    Cardiovascular: Negative for chest pain and palpitations.   Gastrointestinal: Negative for abdominal pain and blood in stool.   Endocrine: Negative.    Genitourinary: Negative for genital sores and hematuria.   Musculoskeletal: Negative for gait problem and joint swelling.   Skin: Negative for color change, pallor, rash and wound.   Allergic/Immunologic: Positive for environmental allergies. Negative for immunocompromised state.   Neurological: Negative for facial asymmetry and  speech difficulty.   Psychiatric/Behavioral: Negative for hallucinations and suicidal ideas.       Objective   Physical Exam  Vitals and nursing note reviewed.   Constitutional:       Appearance: Normal appearance. He is well-developed.   HENT:      Head: Normocephalic and atraumatic.      Nose: Nose normal.      Mouth/Throat:      Mouth: Mucous membranes are moist.      Pharynx: Oropharynx is clear.   Eyes:      Extraocular Movements: Extraocular movements intact.      Conjunctiva/sclera: Conjunctivae normal.      Pupils: Pupils are equal, round, and reactive to light.   Cardiovascular:      Rate and Rhythm: Normal rate and regular rhythm.      Heart sounds: Normal heart sounds. No murmur heard.  No friction rub. No gallop.    Pulmonary:      Effort: Pulmonary effort is normal. No respiratory distress.      Breath sounds: Normal breath sounds. No stridor. No wheezing, rhonchi or rales.   Chest:      Chest wall: No tenderness.   Abdominal:      General: Bowel sounds are normal.      Palpations: Abdomen is soft.   Musculoskeletal:         General: Normal range of motion.      Cervical back: Normal range of motion and neck supple.   Skin:     General: Skin is warm and dry.   Neurological:      General: No focal deficit present.      Mental Status: He is alert and oriented to person, place, and time. Mental status is at baseline.   Psychiatric:         Mood and Affect: Mood normal.         Behavior: Behavior normal.         Thought Content: Thought content normal.         Judgment: Judgment normal.         Assessment/Plan #1 continue present diet and activity levels 2 continue monitoring blood sugar #3 continue vitamin D3  Problems Addressed this Visit        Cardiac and Vasculature    Mixed hyperlipidemia       Endocrine and Metabolic    Non-insulin dependent type 2 diabetes mellitus (HCC) - Primary    Vitamin D deficiency      Other Visit Diagnoses     Colon cancer screening        Relevant Orders    Ambulatory  Referral to Gastroenterology      Diagnoses     Diagnosis Codes Comments    Non-insulin dependent type 2 diabetes mellitus (HCC)    -  Primary ICD-10-CM: E11.9  ICD-9-CM: 250.00     Colon cancer screening     ICD-10-CM: Z12.11  ICD-9-CM: V76.51     Mixed hyperlipidemia     ICD-10-CM: E78.2  ICD-9-CM: 272.2     Vitamin D deficiency     ICD-10-CM: E55.9  ICD-9-CM: 268.9

## 2021-11-08 ENCOUNTER — PREP FOR SURGERY (OUTPATIENT)
Dept: OTHER | Facility: HOSPITAL | Age: 71
End: 2021-11-08

## 2021-11-08 ENCOUNTER — TELEPHONE (OUTPATIENT)
Dept: GASTROENTEROLOGY | Facility: CLINIC | Age: 71
End: 2021-11-08

## 2021-11-08 DIAGNOSIS — K63.5 COLON POLYPS: Primary | ICD-10-CM

## 2021-11-08 NOTE — TELEPHONE ENCOUNTER
Last scope 2/15/11( no records)--Personal history of polyps--No family history of polyps or colon cancer --Aspirin--Medications:      Accu-Chek Softclix Lancets lancets    acyclovir (ZOVIRAX) 400 MG tablet    aspirin (aspirin) 81 MG EC tablet    coenzyme Q10 50 MG capsule capsule    Empagliflozin 25 MG tablet    Fluticasone Propionate (FLONASE NA)    multivitamin with minerals (MULTIVITAMIN ADULT PO)    Ozempic, 1 MG/DOSE, 2 MG/1.5ML solution pen-injector    pravastatin (PRAVACHOL) 40 MG tablet    vitamin D (ERGOCALCIFEROL) 1.25 MG (91986 UT) capsule capsule    Zinc 15 MG capsule       Oa form scan in media

## 2021-12-21 ENCOUNTER — TELEPHONE (OUTPATIENT)
Dept: GASTROENTEROLOGY | Facility: CLINIC | Age: 71
End: 2021-12-21

## 2021-12-21 NOTE — TELEPHONE ENCOUNTER
Good Morning,    Patient called this morning to schedule his colonoscopy with Dr. Marquez. Please call patient after 12pm to schedule procedure.

## 2021-12-21 NOTE — TELEPHONE ENCOUNTER
SW April at [facility] for colonoscopy on  01/27/2022 arrive at  830am  . Mailed Prep instructions to Mailing address on-file. ----miralax      Advised that [facility] will call with final arrival time minimum 24 hrs before procedure. IF they do not get a phone call, arrival time will stay the same as given on instructions

## 2022-01-24 ENCOUNTER — LAB REQUISITION (OUTPATIENT)
Dept: LAB | Facility: HOSPITAL | Age: 72
End: 2022-01-24

## 2022-01-24 DIAGNOSIS — Z00.00 ENCOUNTER FOR GENERAL ADULT MEDICAL EXAMINATION WITHOUT ABNORMAL FINDINGS: ICD-10-CM

## 2022-01-24 LAB — SARS-COV-2 ORF1AB RESP QL NAA+PROBE: NOT DETECTED

## 2022-01-24 PROCEDURE — U0004 COV-19 TEST NON-CDC HGH THRU: HCPCS | Performed by: INTERNAL MEDICINE

## 2022-01-24 PROCEDURE — U0005 INFEC AGEN DETEC AMPLI PROBE: HCPCS | Performed by: INTERNAL MEDICINE

## 2022-01-27 ENCOUNTER — OUTSIDE FACILITY SERVICE (OUTPATIENT)
Dept: GASTROENTEROLOGY | Facility: CLINIC | Age: 72
End: 2022-01-27

## 2022-01-27 PROCEDURE — G0121 COLON CA SCRN NOT HI RSK IND: HCPCS | Performed by: INTERNAL MEDICINE

## 2022-03-03 ENCOUNTER — OFFICE VISIT (OUTPATIENT)
Dept: FAMILY MEDICINE CLINIC | Facility: CLINIC | Age: 72
End: 2022-03-03

## 2022-03-03 VITALS
DIASTOLIC BLOOD PRESSURE: 50 MMHG | TEMPERATURE: 97.3 F | BODY MASS INDEX: 28.46 KG/M2 | SYSTOLIC BLOOD PRESSURE: 90 MMHG | HEART RATE: 79 BPM | WEIGHT: 166.7 LBS | OXYGEN SATURATION: 98 % | HEIGHT: 64 IN

## 2022-03-03 DIAGNOSIS — J30.2 SEASONAL ALLERGIC RHINITIS, UNSPECIFIED TRIGGER: Primary | ICD-10-CM

## 2022-03-03 DIAGNOSIS — H65.90 FLUID COLLECTION OF MIDDLE EAR: ICD-10-CM

## 2022-03-03 DIAGNOSIS — J02.9 SORE THROAT: ICD-10-CM

## 2022-03-03 PROBLEM — E11.9 CONTROLLED TYPE 2 DIABETES MELLITUS WITHOUT COMPLICATION, WITHOUT LONG-TERM CURRENT USE OF INSULIN (HCC): Status: ACTIVE | Noted: 2017-12-21

## 2022-03-03 PROBLEM — E78.5 HYPERLIPIDEMIA: Status: ACTIVE | Noted: 2017-12-21

## 2022-03-03 PROCEDURE — 99213 OFFICE O/P EST LOW 20 MIN: CPT | Performed by: NURSE PRACTITIONER

## 2022-03-03 RX ORDER — FLUTICASONE PROPIONATE 50 MCG
2 SPRAY, SUSPENSION (ML) NASAL DAILY
Qty: 15 ML | Refills: 2 | Status: SHIPPED | OUTPATIENT
Start: 2022-03-03

## 2022-03-03 RX ORDER — METHYLPREDNISOLONE 4 MG/1
TABLET ORAL
Qty: 21 TABLET | Refills: 0 | Status: SHIPPED | OUTPATIENT
Start: 2022-03-03 | End: 2022-03-09

## 2022-03-03 NOTE — PROGRESS NOTES
"Chief Complaint  Sore Throat (started yesterday) and Sinus Problem (face felt flushed)    Subjective          Brian Garcia presents to [facility]  History of Present Illness   71-year-old male, patient of Dr. Wolfe, new to me, presenting with complaints of nasal congestion and earache for several days, denies fever, loss of taste or smell, N/V/D or any known exposure to COVID. He has seasonal allergies and states this is the time of year he starts having symptoms and taking Flonase, unable to take oral allergy medication states makes him feel weird.  He has had Medrol Dosepak in the past with good results.     Objective   Vital Signs:   BP 90/50 (BP Location: Left arm, Patient Position: Sitting, Cuff Size: Large Adult)   Pulse 79   Temp 97.3 °F (36.3 °C) (Infrared)   Ht 162.6 cm (64.02\")   Wt 75.6 kg (166 lb 11.2 oz)   SpO2 98%   BMI 28.60 kg/m²     Physical Exam  HENT:      Head: Normocephalic.      Right Ear: A middle ear effusion is present.      Left Ear: A middle ear effusion is present.      Mouth/Throat:      Pharynx: Posterior oropharyngeal erythema present.   Cardiovascular:      Rate and Rhythm: Normal rate and regular rhythm.      Pulses: Normal pulses.      Heart sounds: Normal heart sounds.   Pulmonary:      Effort: Pulmonary effort is normal.      Breath sounds: Normal breath sounds.   Neurological:      Mental Status: He is alert and oriented to person, place, and time.        Result Review :                 Assessment and Plan    Diagnoses and all orders for this visit:    1. Seasonal allergic rhinitis, unspecified trigger (Primary)  -     fluticasone (Flonase) 50 MCG/ACT nasal spray; 2 sprays into the nostril(s) as directed by provider Daily.  Dispense: 15 mL; Refill: 2  -     methylPREDNISolone (Medrol) 4 MG dose pack; follow package directions  Dispense: 21 tablet; Refill: 0    2. Fluid collection of middle ear  -     fluticasone (Flonase) 50 MCG/ACT nasal " spray; 2 sprays into the nostril(s) as directed by provider Daily.  Dispense: 15 mL; Refill: 2  -     methylPREDNISolone (Medrol) 4 MG dose pack; follow package directions  Dispense: 21 tablet; Refill: 0    3. Sore throat        Follow Up   Return if symptoms worsen or fail to improve.  Patient was given instructions and counseling regarding his condition or for health maintenance advice. Please see specific information pulled into the AVS if appropriate.

## 2022-03-03 NOTE — PATIENT INSTRUCTIONS
Otitis Externa    Otitis externa is an infection of the outer ear canal. The outer ear canal is the area between the outside of the ear and the eardrum. Otitis externa is sometimes called swimmer's ear.  What are the causes?  Common causes of this condition include:  · Swimming in dirty water.  · Moisture in the ear.  · An injury to the inside of the ear.  · An object stuck in the ear.  · A cut or scrape on the outside of the ear.  What increases the risk?  You are more likely to develop this condition if you go swimming often.  What are the signs or symptoms?  The first symptom of this condition is often itching in the ear. Later symptoms of the condition include:  · Swelling of the ear.  · Redness in the ear.  · Ear pain. The pain may get worse when you pull on your ear.  · Pus coming from the ear.  How is this diagnosed?  This condition may be diagnosed by examining the ear and testing fluid from the ear for bacteria and funguses.  How is this treated?  This condition may be treated with:  · Antibiotic ear drops. These are often given for 10-14 days.  · Medicines to reduce itching and swelling.  Follow these instructions at home:  · If you were prescribed antibiotic ear drops, use them as told by your health care provider. Do not stop using the antibiotic even if your condition improves.  · Take over-the-counter and prescription medicines only as told by your health care provider.  · Avoid getting water in your ears as told by your health care provider. This may include avoiding swimming or water sports for a few days.  · Keep all follow-up visits as told by your health care provider. This is important.  How is this prevented?  · Keep your ears dry. Use the corner of a towel to dry your ears after you swim or bathe.  · Avoid scratching or putting things in your ear. Doing these things can damage the ear canal or remove the protective wax that lines it, which makes it easier for bacteria and funguses to  grow.  · Avoid swimming in lakes, polluted water, or pools that may not have enough chlorine.  Contact a health care provider if:  · You have a fever.  · Your ear is still red, swollen, painful, or draining pus after 3 days.  · Your redness, swelling, or pain gets worse.  · You have a severe headache.  · You have redness, swelling, pain, or tenderness in the area behind your ear.  Summary  · Otitis externa is an infection of the outer ear canal.  · Common causes include swimming in dirty water, moisture in the ear, or a cut or scrape in the ear.  · Symptoms include pain, redness, and swelling of the ear.  · If you were prescribed antibiotic ear drops, use them as told by your health care provider. Do not stop using the antibiotic even if your condition improves.  This information is not intended to replace advice given to you by your health care provider. Make sure you discuss any questions you have with your health care provider.  Document Revised: 05/24/2019 Document Reviewed: 05/24/2019  [employer] Patient Education © 2021 [employer]  Methylprednisolone tablets  What is this medicine?  METHYLPREDNISOLONE (meth ill pred NISS oh lone) is a corticosteroid. It is commonly used to treat inflammation of the skin, joints, lungs, and other organs. Common conditions treated include asthma, allergies, and arthritis. It is also used for other conditions, such as blood disorders and diseases of the adrenal glands.  This medicine may be used for other purposes; ask your health care provider or pharmacist if you have questions.  COMMON BRAND NAME(S): Medrol, Medrol Dosepak  What should I tell my health care provider before I take this medicine?  They need to know if you have any of these conditions:  · Cushing's syndrome  · eye disease, vision problems  · diabetes  · glaucoma  · heart disease  · high blood pressure  · infection (especially a virus infection such as chickenpox, cold sores, or herpes)  · liver disease  · mental  illness  · myasthenia gravis  · osteoporosis  · recently received or scheduled to receive a vaccine  · seizures  · stomach or intestine problems  · thyroid disease  · an unusual or allergic reaction to lactose, methylprednisolone, other medicines, foods, dyes, or preservatives  · pregnant or trying to get pregnant  · breast-feeding  How should I use this medicine?  Take this medicine by mouth with a glass of water. Follow the directions on the prescription label. Take this medicine with food. If you are taking this medicine once a day, take it in the morning. Do not take it more often than directed. Do not suddenly stop taking your medicine because you may develop a severe reaction. Your doctor will tell you how much medicine to take. If your doctor wants you to stop the medicine, the dose may be slowly lowered over time to avoid any side effects.  Talk to your pediatrician regarding the use of this medicine in children. Special care may be needed.  Overdosage: If you think you have taken too much of this medicine contact a poison control center or emergency room at once.  NOTE: This medicine is only for you. Do not share this medicine with others.  What if I miss a dose?  If you miss a dose, take it as soon as you can. If it is almost time for your next dose, talk to your doctor or health care professional. You may need to miss a dose or take an extra dose. Do not take double or extra doses without advice.  What may interact with this medicine?  Do not take this medicine with any of the following medications:  · alefacept  · echinacea  · live virus vaccines  · metyrapone  · mifepristone  This medicine may also interact with the following medications:  · amphotericin B  · aspirin and aspirin-like medicines  · certain antibiotics like erythromycin, clarithromycin, troleandomycin  · certain medicines for diabetes  · certain medicines for fungal infections like ketoconazole  · certain medicines for seizures like  carbamazepine, phenobarbital, phenytoin  · certain medicines that treat or prevent blood clots like warfarin  · cholestyramine  · cyclosporine  · digoxin  · diuretics  · female hormones, like estrogens and birth control pills  · isoniazid  · NSAIDs, medicines for pain inflammation, like ibuprofen or naproxen  · other medicines for myasthenia gravis  · rifampin  · vaccines  This list may not describe all possible interactions. Give your health care provider a list of all the medicines, herbs, non-prescription drugs, or dietary supplements you use. Also tell them if you smoke, drink alcohol, or use illegal drugs. Some items may interact with your medicine.  What should I watch for while using this medicine?  Tell your doctor or healthcare professional if your symptoms do not start to get better or if they get worse. Do not stop taking except on your doctor's advice. You may develop a severe reaction. Your doctor will tell you how much medicine to take.  This medicine may increase your risk of getting an infection. Tell your doctor or health care professional if you are around anyone with measles or chickenpox, or if you develop sores or blisters that do not heal properly.  This medicine may increase blood sugar levels. Ask your healthcare provider if changes in diet or medicines are needed if you have diabetes.  Tell your doctor or health care professional right away if you have any change in your eyesight.  Using this medicine for a long time may increase your risk of low bone mass. Talk to your doctor about bone health.  What side effects may I notice from receiving this medicine?  Side effects that you should report to your doctor or health care professional as soon as possible:  · allergic reactions like skin rash, itching or hives, swelling of the face, lips, or tongue  · bloody or tarry stools  · hallucination, loss of contact with reality  · muscle cramps  · muscle pain  · palpitations  · signs and symptoms of  high blood sugar such as being more thirsty or hungry or having to urinate more than normal. You may also feel very tired or have blurry vision.  · signs and symptoms of infection like fever or chills; cough; sore throat; pain or trouble passing urine  Side effects that usually do not require medical attention (report to your doctor or health care professional if they continue or are bothersome):  · changes in emotions or mood  · constipation  · diarrhea  · excessive hair growth on the face or body  · headache  · nausea, vomiting  · trouble sleeping  · weight gain  This list may not describe all possible side effects. Call your doctor for medical advice about side effects. You may report side effects to FDA at 6-032-RSU-1920.  Where should I keep my medicine?  Keep out of the reach of children.  Store at room temperature between 20 and 25 degrees C (68 and 77 degrees F). Throw away any unused medicine after the expiration date.  NOTE: This sheet is a summary. It may not cover all possible information. If you have questions about this medicine, talk to your doctor, pharmacist, or health care provider.  © 2021 [employer]vier/[employer] Standard (2019-09-19 09:19:36)

## 2022-03-07 ENCOUNTER — TELEPHONE (OUTPATIENT)
Dept: FAMILY MEDICINE CLINIC | Facility: CLINIC | Age: 72
End: 2022-03-07

## 2022-03-07 NOTE — TELEPHONE ENCOUNTER
Caller: Brian Garcia    Relationship: Self    Best call back number: [phone]    What is the best time to reach you: ANY    Who are you requesting to speak with (clinical staff, provider, specific staff member): CLINICAL     What was the call regarding: PT HAD APPT ON 3/3/22 AND WAS PRESCRIBED A STEROID. PT STATED THAT HIS SORE THROAT HAS IMPROVED, BUT NOW HE FEELS LIKE THE CONGESTION HAS MOVED DOWN INTO HIS CHEST. PT WILL TAKE LAST DOSE OF STEROID TOMORROW.     Do you require a callback: YES

## 2022-03-09 ENCOUNTER — OFFICE VISIT (OUTPATIENT)
Dept: FAMILY MEDICINE CLINIC | Facility: CLINIC | Age: 72
End: 2022-03-09

## 2022-03-09 VITALS
TEMPERATURE: 97.1 F | HEIGHT: 64 IN | WEIGHT: 165 LBS | SYSTOLIC BLOOD PRESSURE: 124 MMHG | BODY MASS INDEX: 28.17 KG/M2 | OXYGEN SATURATION: 97 % | HEART RATE: 82 BPM | DIASTOLIC BLOOD PRESSURE: 72 MMHG

## 2022-03-09 DIAGNOSIS — R09.89 CHEST CONGESTION: ICD-10-CM

## 2022-03-09 DIAGNOSIS — J01.00 ACUTE NON-RECURRENT MAXILLARY SINUSITIS: Primary | ICD-10-CM

## 2022-03-09 PROCEDURE — 99213 OFFICE O/P EST LOW 20 MIN: CPT | Performed by: NURSE PRACTITIONER

## 2022-03-09 RX ORDER — AZITHROMYCIN 250 MG/1
TABLET, FILM COATED ORAL
Qty: 6 TABLET | Refills: 0 | Status: SHIPPED | OUTPATIENT
Start: 2022-03-09 | End: 2022-03-31 | Stop reason: ALTCHOICE

## 2022-03-31 ENCOUNTER — OFFICE VISIT (OUTPATIENT)
Dept: FAMILY MEDICINE CLINIC | Facility: CLINIC | Age: 72
End: 2022-03-31

## 2022-03-31 VITALS
OXYGEN SATURATION: 99 % | TEMPERATURE: 96.9 F | HEIGHT: 64 IN | SYSTOLIC BLOOD PRESSURE: 118 MMHG | HEART RATE: 91 BPM | BODY MASS INDEX: 28.61 KG/M2 | DIASTOLIC BLOOD PRESSURE: 60 MMHG | WEIGHT: 167.6 LBS

## 2022-03-31 DIAGNOSIS — L03.90 CELLULITIS, UNSPECIFIED CELLULITIS SITE: ICD-10-CM

## 2022-03-31 DIAGNOSIS — Z00.00 MEDICARE ANNUAL WELLNESS VISIT, SUBSEQUENT: Primary | ICD-10-CM

## 2022-03-31 DIAGNOSIS — E11.9 CONTROLLED TYPE 2 DIABETES MELLITUS WITHOUT COMPLICATION, WITHOUT LONG-TERM CURRENT USE OF INSULIN: ICD-10-CM

## 2022-03-31 DIAGNOSIS — G45.9 TIA (TRANSIENT ISCHEMIC ATTACK): ICD-10-CM

## 2022-03-31 DIAGNOSIS — Z12.5 PROSTATE CANCER SCREENING: ICD-10-CM

## 2022-03-31 PROCEDURE — 1170F FXNL STATUS ASSESSED: CPT | Performed by: INTERNAL MEDICINE

## 2022-03-31 PROCEDURE — 99214 OFFICE O/P EST MOD 30 MIN: CPT | Performed by: INTERNAL MEDICINE

## 2022-03-31 PROCEDURE — G0439 PPPS, SUBSEQ VISIT: HCPCS | Performed by: INTERNAL MEDICINE

## 2022-03-31 PROCEDURE — 1159F MED LIST DOCD IN RCRD: CPT | Performed by: INTERNAL MEDICINE

## 2022-03-31 PROCEDURE — 1126F AMNT PAIN NOTED NONE PRSNT: CPT | Performed by: INTERNAL MEDICINE

## 2022-03-31 RX ORDER — METHYLPREDNISOLONE 4 MG/1
21 TABLET ORAL DAILY
Qty: 21 TABLET | Refills: 0 | Status: SHIPPED | OUTPATIENT
Start: 2022-03-31 | End: 2022-07-06

## 2022-03-31 RX ORDER — EPINEPHRINE 0.3 MG/.3ML
0.3 INJECTION SUBCUTANEOUS ONCE
Qty: 1 EACH | Refills: 0 | Status: SHIPPED | OUTPATIENT
Start: 2022-03-31 | End: 2022-07-06 | Stop reason: SDUPTHER

## 2022-03-31 RX ORDER — CLINDAMYCIN HYDROCHLORIDE 150 MG/1
150 CAPSULE ORAL 3 TIMES DAILY
Qty: 21 CAPSULE | Refills: 1 | Status: SHIPPED | OUTPATIENT
Start: 2022-03-31 | End: 2022-07-06

## 2022-03-31 NOTE — PATIENT INSTRUCTIONS
Medicare Wellness  Personal Prevention Plan of Service     Date of Office Visit:    Encounter Provider:  Andrey Wolfe MD  Place of Service:  [facility]  Patient Name: Brian Garcia  :  1950    As part of the Medicare Wellness portion of your visit today, we are providing you with this personalized preventive plan of services (PPPS). This plan is based upon recommendations of the [place] Preventive Services Task Force (USPSTF) and the Advisory Committee on Immunization Practices (ACIP).    This lists the preventive care services that should be considered, and provides dates of when you are due. Items listed as completed are up-to-date and do not require any further intervention.    Health Maintenance   Topic Date Due    TDAP/TD VACCINES (1 - Tdap) Never done    Pneumococcal Vaccine 65+ (1 of 1 - PPSV23) Never done    ZOSTER VACCINE (2 of 3) 10/18/2016    DIABETIC FOOT EXAM  2018    URINE MICROALBUMIN  2022    INFLUENZA VACCINE  2023 (Originally 2021)    HEMOGLOBIN A1C  2022    LIPID PANEL  2022    DIABETIC EYE EXAM  2022    ANNUAL WELLNESS VISIT  2023    COLORECTAL CANCER SCREENING  2032    COVID-19 Vaccine  Completed    HEPATITIS C SCREENING  Addressed       Orders Placed This Encounter   Procedures    CBC (No Diff)     Standing Status:   Future     Standing Expiration Date:   3/31/2023     Order Specific Question:   Release to patient     Answer:   Immediate    Comprehensive Metabolic Panel     Order Specific Question:   Release to patient     Answer:   Immediate    Lipid Panel    Vitamin D 25 Hydroxy     Order Specific Question:   Release to patient     Answer:   Immediate    PSA Screen     Standing Status:   Future     Standing Expiration Date:   3/31/2023     Order Specific Question:   Release to patient     Answer:   Immediate       No follow-ups on file.

## 2022-03-31 NOTE — PROGRESS NOTES
QUICK REFERENCE INFORMATION:  The ABCs of the Annual Wellness Visit    Subsequent Medicare Wellness Visit patient was seen for Medicare wellness exam.  Patient was seen for diabetes.  Blood sugars been running 120s.  Hemoglobin A1c was 6.5% and this was 7 months ago.  Patient is having labs drawn.  Patient had a TIA several years ago and is doing well at present time.  Patient has not had any more symptoms since then.  Patient has had resolved he was able to move both upper and lower extremities.  Patient is taking an aspirin daily.  Patient was out in his yard working when he was bit several times started swelling with erythema.  Patient's had this before and was given clindamycin 150 mg 3 times daily with a Medrol Dosepak.  Patient was informed that his Dosepak can send his blood sugars to 3 and 400s.  Patient was advised to take the clindamycin 150 mg 3 times daily and Benadryl 25 mg 1 to 2 tablets every 8 hours as needed instead of using the Dosepak.  Patient will follow up in 1 week if not better.    Dictated utilizing Dragon dictation. If there are questions or for further clarification, please contact me.    HEALTH RISK ASSESSMENT    1950    Recent Hospitalizations:  No hospitalization(s) within the last year..        Current Medical Providers:  Patient Care Team:  Andrey Wolfe MD as PCP - General (Internal Medicine)  Andrey Wolfe MD as Consulting Physician (Internal Medicine)  Leandro Rodas MD as Consulting Physician (Urology)  Lara Sterling APRN (Family Medicine)        Smoking Status:  Social History     Tobacco Use   Smoking Status Never Smoker   Smokeless Tobacco Never Used       Alcohol Consumption:  Social History     Substance and Sexual Activity   Alcohol Use Never       Depression Screen:   PHQ-2/PHQ-9 Depression Screening 3/31/2022   Retired PHQ-9 Total Score -   Retired Total Score -   Little Interest or Pleasure in Doing Things 0-->not at all   Feeling Down,  Depressed or Hopeless 0-->not at all   PHQ-9: Brief Depression Severity Measure Score 0       Health Habits and Functional and Cognitive Screening:  Functional & Cognitive Status 3/31/2022   Do you have difficulty preparing food and eating? No   Do you have difficulty bathing yourself, getting dressed or grooming yourself? No   Do you have difficulty using the toilet? No   Do you have difficulty moving around from place to place? No   Do you have trouble with steps or getting out of a bed or a chair? No   Current Diet Well Balanced Diet   Dental Exam Up to date   Eye Exam Up to date   Exercise (times per week) 0 times per week   Current Exercises Include -   Current Exercise Activities Include -   Do you need help using the phone?  No   Are you deaf or do you have serious difficulty hearing?  No   Do you need help with transportation? No   Do you need help shopping? No   Do you need help preparing meals?  No   Do you need help with housework?  No   Do you need help with laundry? No   Do you need help taking your medications? No   Do you need help managing money? No   Do you ever drive or ride in a car without wearing a seat belt? No   Have you felt unusual stress, anger or loneliness in the last month? No   Who do you live with? Spouse   If you need help, do you have trouble finding someone available to you? No   Have you been bothered in the last four weeks by sexual problems? -   Do you have difficulty concentrating, remembering or making decisions? -           Does the patient have evidence of cognitive impairment? No    Aspirin use counseling: Taking ASA appropriately as indicated      Recent Lab Results:  CMP:  Lab Results   Component Value Date    GLU 94 12/09/2021    BUN 23 (H) 12/09/2021    CREATININE 0.9 12/09/2021    EGFRIFNONA 73 08/25/2021    EGFRIFAFRI 80 09/30/2020    BCR 25.0 (H) 12/09/2021     12/09/2021    K 4.4 12/09/2021    CO2 21 (L) 12/09/2021    CALCIUM 9.3 12/09/2021    PROTENTOTREF 6.3  09/30/2020    ALBUMIN 4.20 08/25/2021    LABGLOBREF 2.0 09/30/2020    LABIL2 1.4 01/21/2021    BILITOT 0.5 08/25/2021    ALKPHOS 32 (L) 08/25/2021    AST 21 08/25/2021    ALT 29 08/25/2021     Lipid Panel:  Lab Results   Component Value Date    CHOL 110 08/25/2021    TRIG 70 08/25/2021    HDL 47 08/25/2021    VLDL 15 08/25/2021    LDLHDL 1.04 08/25/2021     HbA1c:  Lab Results   Component Value Date    HGBA1C 7.4 (H) 12/09/2021       Visual Acuity:  No exam data present    Age-appropriate Screening Schedule:  Refer to the list below for future screening recommendations based on patient's age, sex and/or medical conditions. Orders for these recommended tests are listed in the plan section. The patient has been provided with a written plan.    Health Maintenance   Topic Date Due   • TDAP/TD VACCINES (1 - Tdap) Never done   • ZOSTER VACCINE (2 of 3) 10/18/2016   • DIABETIC FOOT EXAM  04/23/2018   • URINE MICROALBUMIN  01/21/2022   • INFLUENZA VACCINE  03/31/2023 (Originally 8/1/2021)   • HEMOGLOBIN A1C  06/09/2022   • LIPID PANEL  08/25/2022   • DIABETIC EYE EXAM  11/09/2022        Subjective   History of Present Illness    Brian Garcia is a 71 y.o. male who presents for an Subsequent Wellness Visit.    The following portions of the patient's history were reviewed and updated as appropriate: allergies, current medications, past family history, past medical history, past social history, past surgical history and problem list.    Outpatient Medications Prior to Visit   Medication Sig Dispense Refill   • Accu-Chek Softclix Lancets lancets Use as instructed.     • acyclovir (ZOVIRAX) 400 MG tablet As Needed.     • aspirin (aspirin) 81 MG EC tablet Take 1 tablet by mouth Daily. 30 tablet 0   • coenzyme Q10 50 MG capsule capsule Take  by mouth Daily.     • Empagliflozin 25 MG tablet Take 25 mg by mouth Every Morning.     • fluticasone (Flonase) 50 MCG/ACT nasal spray 2 sprays into the nostril(s) as directed by provider  Daily. 15 mL 2   • multivitamin with minerals tablet tablet Take 1 tablet by mouth Daily.     • Ozempic, 1 MG/DOSE, 2 MG/1.5ML solution pen-injector      • pravastatin (PRAVACHOL) 40 MG tablet TAKE ONE (1) TABLET BY MOUTH EVERY NIGHT. 90 tablet 1   • Sildenafil Citrate (VIAGRA PO) Take  by mouth As Needed.     • vitamin D (ERGOCALCIFEROL) 1.25 MG (98717 UT) capsule capsule TAKE ONE (1) CAPSULE BY MOUTH EVERY 7 (SEVEN) DAYS. ONE (1) CAPSULE ONCE A WEEK  4 capsule 5   • Zinc 15 MG capsule Take 30 mg by mouth.     • azithromycin (Zithromax Z-Jeferson) 250 MG tablet Take 2 tablets by mouth on day 1, then 1 tablet daily on days 2-5 6 tablet 0     No facility-administered medications prior to visit.       Patient Active Problem List   Diagnosis   • Acute bronchitis   • Acute pharyngitis   • Acute upper respiratory infection   • Allergic rhinitis   • Carpal tunnel syndrome   • Cough   • Alimentary obesity   • Fatigue   • Effusion of knee   • ED (erectile dysfunction) of organic origin   • Muscle ache   • Thumb pain   • Welcome to Medicare preventive visit   • Controlled type 2 diabetes mellitus without complication, without long-term current use of insulin (HCC)   • Hyperlipidemia   • Vitamin D deficiency   • Refusal of statin medication by patient   • Medication management   • Male hypogonadism   • Low testosterone   • Benign prostatic hyperplasia without lower urinary tract symptoms   • Hives   • Vertigo   • Slurred speech, transient   • Impaired left ventricular relaxation   • TIA (transient ischemic attack)   • Nonrheumatic tricuspid valve regurgitation   • Bladder cancer (HCC)       Advance Care Planning:  ACP discussion was held with the patient during this visit. Patient has an advance directive in EMR which is still valid.     Identification of Risk Factors:  Risk factors include: NA.    Review of Systems   Constitutional: Negative for fatigue and fever.   HENT: Positive for congestion. Negative for trouble swallowing.     Eyes: Negative for discharge and visual disturbance.   Respiratory: Negative for choking and shortness of breath.    Cardiovascular: Negative for chest pain and palpitations.   Gastrointestinal: Negative for abdominal pain and blood in stool.   Endocrine: Negative.    Genitourinary: Negative for genital sores and hematuria.   Musculoskeletal: Negative for gait problem and joint swelling.   Skin: Positive for wound. Negative for color change, pallor and rash.   Allergic/Immunologic: Positive for environmental allergies. Negative for immunocompromised state.   Neurological: Negative for facial asymmetry and speech difficulty.   Psychiatric/Behavioral: Negative for hallucinations and suicidal ideas.       Compared to one year ago, the patient feels his physical health is the same.  Compared to one year ago, the patient feels his mental health is the same.    Objective     Physical Exam  Vitals and nursing note reviewed.   Constitutional:       Appearance: Normal appearance. He is well-developed.   HENT:      Head: Normocephalic and atraumatic.      Nose: Nose normal.      Mouth/Throat:      Mouth: Mucous membranes are moist.      Pharynx: Oropharynx is clear.   Eyes:      Extraocular Movements: Extraocular movements intact.      Conjunctiva/sclera: Conjunctivae normal.      Pupils: Pupils are equal, round, and reactive to light.   Cardiovascular:      Rate and Rhythm: Normal rate and regular rhythm.      Heart sounds: Normal heart sounds. No murmur heard.    No friction rub. No gallop.   Pulmonary:      Effort: Pulmonary effort is normal. No respiratory distress.      Breath sounds: Normal breath sounds. No stridor. No wheezing, rhonchi or rales.   Chest:      Chest wall: No tenderness.   Abdominal:      General: Bowel sounds are normal.      Palpations: Abdomen is soft.   Musculoskeletal:         General: Normal range of motion.      Cervical back: Normal range of motion and neck supple.   Skin:     General: Skin is  "warm and dry.      Findings: Lesion present.   Neurological:      General: No focal deficit present.      Mental Status: He is alert and oriented to person, place, and time. Mental status is at baseline.   Psychiatric:         Mood and Affect: Mood normal.         Behavior: Behavior normal.         Thought Content: Thought content normal.         Judgment: Judgment normal.         Vitals:    03/31/22 1425   BP: 118/60   BP Location: Left arm   Patient Position: Sitting   Cuff Size: Adult   Pulse: 91   Temp: 96.9 °F (36.1 °C)   TempSrc: Infrared   SpO2: 99%   Weight: 76 kg (167 lb 9.6 oz)   Height: 162.6 cm (64.02\")   PainSc: 0-No pain       Patient's Body mass index is 28.75 kg/m². indicating that he is within normal range (BMI 18.5-24.9). No BMI management plan needed..      Assessment/Plan #1 clindamycin 950 mg 3 times daily x7 days with a refill, Benadryl 25 mg 1-2 every 8 hours as needed allergies #3 follow-up in 1 month #4 continue monitoring blood sugar #5 yearly eye exam  Patient Self-Management and Personalized Health Advice  The patient has been provided with information about: NA and preventive services including:   · NA.    Visit Diagnoses:    ICD-10-CM ICD-9-CM   1. Medicare annual wellness visit, subsequent  Z00.00 V70.0   2. Controlled type 2 diabetes mellitus without complication, without long-term current use of insulin (HCC)  E11.9 250.00   3. TIA (transient ischemic attack)  G45.9 435.9   4. Prostate cancer screening  Z12.5 V76.44   5. Cellulitis, unspecified cellulitis site  L03.90 682.9       Orders Placed This Encounter   Procedures   • CBC (No Diff)     Standing Status:   Future     Standing Expiration Date:   3/31/2023     Order Specific Question:   Release to patient     Answer:   Immediate   • Comprehensive Metabolic Panel     Order Specific Question:   Release to patient     Answer:   Immediate   • Lipid Panel   • Vitamin D 25 Hydroxy     Order Specific Question:   Release to patient     " Answer:   Immediate   • PSA Screen     Standing Status:   Future     Standing Expiration Date:   3/31/2023     Order Specific Question:   Release to patient     Answer:   Immediate       Outpatient Encounter Medications as of 3/31/2022   Medication Sig Dispense Refill   • Accu-Chek Softclix Lancets lancets Use as instructed.     • acyclovir (ZOVIRAX) 400 MG tablet As Needed.     • aspirin (aspirin) 81 MG EC tablet Take 1 tablet by mouth Daily. 30 tablet 0   • coenzyme Q10 50 MG capsule capsule Take  by mouth Daily.     • Empagliflozin 25 MG tablet Take 25 mg by mouth Every Morning.     • fluticasone (Flonase) 50 MCG/ACT nasal spray 2 sprays into the nostril(s) as directed by provider Daily. 15 mL 2   • multivitamin with minerals tablet tablet Take 1 tablet by mouth Daily.     • Ozempic, 1 MG/DOSE, 2 MG/1.5ML solution pen-injector      • pravastatin (PRAVACHOL) 40 MG tablet TAKE ONE (1) TABLET BY MOUTH EVERY NIGHT. 90 tablet 1   • Sildenafil Citrate (VIAGRA PO) Take  by mouth As Needed.     • vitamin D (ERGOCALCIFEROL) 1.25 MG (78529 UT) capsule capsule TAKE ONE (1) CAPSULE BY MOUTH EVERY 7 (SEVEN) DAYS. ONE (1) CAPSULE ONCE A WEEK  4 capsule 5   • Zinc 15 MG capsule Take 30 mg by mouth.     • clindamycin (Cleocin) 150 MG capsule Take 1 capsule by mouth 3 (Three) Times a Day. 21 capsule 1   • EPINEPHrine (EpiPen 2-Jeferson) 0.3 MG/0.3ML solution auto-injector injection Inject 0.3 mL under the skin into the appropriate area as directed 1 (One) Time for 1 dose. 1 each 0   • methylPREDNISolone (MEDROL) 4 MG dose pack Take 21 tablets by mouth Daily. Take as directed on package instructions. 21 tablet 0   • [DISCONTINUED] azithromycin (Zithromax Z-Jeferson) 250 MG tablet Take 2 tablets by mouth on day 1, then 1 tablet daily on days 2-5 6 tablet 0     No facility-administered encounter medications on file as of 3/31/2022.       Reviewed use of high risk medication in the elderly: not applicable  Reviewed for potential of harmful  drug interactions in the elderly: not applicable    Follow Up:  Return in about 1 year (around 3/31/2023), or if symptoms worsen or fail to improve, for Recheck.     An After Visit Summary and PPPS with all of these plans were given to the patient.

## 2022-07-05 RX ORDER — PRAVASTATIN SODIUM 40 MG
TABLET ORAL
Qty: 90 TABLET | Refills: 1 | Status: SHIPPED | OUTPATIENT
Start: 2022-07-05 | End: 2022-12-29 | Stop reason: SDUPTHER

## 2022-07-06 ENCOUNTER — OFFICE VISIT (OUTPATIENT)
Dept: FAMILY MEDICINE CLINIC | Facility: CLINIC | Age: 72
End: 2022-07-06

## 2022-07-06 VITALS
OXYGEN SATURATION: 98 % | HEART RATE: 75 BPM | BODY MASS INDEX: 27.66 KG/M2 | TEMPERATURE: 97.1 F | DIASTOLIC BLOOD PRESSURE: 66 MMHG | HEIGHT: 64 IN | WEIGHT: 162 LBS | SYSTOLIC BLOOD PRESSURE: 112 MMHG

## 2022-07-06 DIAGNOSIS — M54.40 LOW BACK PAIN WITH SCIATICA, SCIATICA LATERALITY UNSPECIFIED, UNSPECIFIED BACK PAIN LATERALITY, UNSPECIFIED CHRONICITY: Primary | ICD-10-CM

## 2022-07-06 DIAGNOSIS — E11.9 CONTROLLED TYPE 2 DIABETES MELLITUS WITHOUT COMPLICATION, WITHOUT LONG-TERM CURRENT USE OF INSULIN: ICD-10-CM

## 2022-07-06 DIAGNOSIS — E55.9 VITAMIN D DEFICIENCY: ICD-10-CM

## 2022-07-06 PROBLEM — B00.1 FEVER BLISTER: Status: ACTIVE | Noted: 2022-07-06

## 2022-07-06 PROCEDURE — 99214 OFFICE O/P EST MOD 30 MIN: CPT | Performed by: INTERNAL MEDICINE

## 2022-07-06 RX ORDER — CYCLOBENZAPRINE HCL 5 MG
5 TABLET ORAL 3 TIMES DAILY PRN
Qty: 30 TABLET | Refills: 3 | Status: SHIPPED | OUTPATIENT
Start: 2022-07-06 | End: 2022-10-14

## 2022-07-06 RX ORDER — ACETAMINOPHEN 325 MG/1
TABLET ORAL
Qty: 60 TABLET | Refills: 3 | Status: SHIPPED | OUTPATIENT
Start: 2022-07-06

## 2022-07-06 RX ORDER — NAPROXEN SODIUM 220 MG
220 TABLET ORAL 2 TIMES DAILY PRN
Qty: 60 TABLET | Refills: 3 | Status: SHIPPED | OUTPATIENT
Start: 2022-07-06 | End: 2022-10-14

## 2022-07-06 NOTE — PROGRESS NOTES
Subjective   Brian Garcia is a 72 y.o. male.     Vitals:    07/06/22 1049   BP: 112/66   Pulse: 75   Temp: 97.1 °F (36.2 °C)   SpO2: 98%      Body mass index is 27.81 kg/m².     History of Present Illness   Patient was seen for low back pain.  Patient twisted it a week ago and developed severe lower lumbar pain but did not radiate.  Patient was using a warm soaks with good relief.  Patient was given moist heating pad, Aleve 220 mg twice daily, Tylenol 2 tablets p.o. 3 times daily, Flexeril 5 mg nightly.  Patient was advised to stretch but did not want to go to therapy.  Patient states his wife knows stretching exercises he can do at home.  Patient blood sugars been running in the 140s to 150s.  Patient's latest hemoglobin A1c was 7.2%.  Patient does see endocrine and follows the blood sugar.  Patient does have vitamin D3 deficiency but does not want to restart it.    Dictated utilizing Dragon dictation. If there are questions or for further clarification, please contact me.  The following portions of the patient's history were reviewed and updated as appropriate: allergies, current medications, past family history, past medical history, past social history, past surgical history and problem list.    Review of Systems   Constitutional: Negative for fatigue and fever.   HENT: Positive for congestion. Negative for trouble swallowing.    Eyes: Negative for discharge and visual disturbance.   Respiratory: Negative for choking and shortness of breath.    Cardiovascular: Negative for chest pain and palpitations.   Gastrointestinal: Negative for abdominal pain and blood in stool.   Endocrine: Negative.    Genitourinary: Negative for genital sores and hematuria.   Musculoskeletal: Positive for back pain and gait problem. Negative for joint swelling.   Skin: Negative for color change, pallor, rash and wound.   Allergic/Immunologic: Positive for environmental allergies. Negative for immunocompromised state.   Neurological:  Negative for facial asymmetry and speech difficulty.   Psychiatric/Behavioral: Negative for hallucinations and suicidal ideas.       Objective   Physical Exam  Vitals and nursing note reviewed.   Constitutional:       Appearance: Normal appearance. He is well-developed.   HENT:      Head: Normocephalic and atraumatic.      Nose: Nose normal.      Mouth/Throat:      Mouth: Mucous membranes are moist.      Pharynx: Oropharynx is clear.   Eyes:      Extraocular Movements: Extraocular movements intact.      Conjunctiva/sclera: Conjunctivae normal.      Pupils: Pupils are equal, round, and reactive to light.   Cardiovascular:      Rate and Rhythm: Normal rate and regular rhythm.      Heart sounds: Normal heart sounds. No murmur heard.    No friction rub. No gallop.   Pulmonary:      Effort: Pulmonary effort is normal. No respiratory distress.      Breath sounds: Normal breath sounds. No stridor. No wheezing, rhonchi or rales.   Chest:      Chest wall: No tenderness.   Abdominal:      General: Bowel sounds are normal.      Palpations: Abdomen is soft.   Musculoskeletal:         General: Swelling and tenderness present. Normal range of motion.      Cervical back: Normal range of motion and neck supple.   Skin:     General: Skin is warm and dry.   Neurological:      General: No focal deficit present.      Mental Status: He is alert and oriented to person, place, and time. Mental status is at baseline.   Psychiatric:         Mood and Affect: Mood normal.         Behavior: Behavior normal.         Thought Content: Thought content normal.         Judgment: Judgment normal.         Assessment & Plan #1 medications see above #2 moist heating pad #3 recommend physical therapy  Problems Addressed this Visit        Endocrine and Metabolic    Controlled type 2 diabetes mellitus without complication, without long-term current use of insulin (HCC)    Vitamin D deficiency      Other Visit Diagnoses     Low back pain with sciatica,  sciatica laterality unspecified, unspecified back pain laterality, unspecified chronicity    -  Primary      Diagnoses     Diagnosis Codes Comments    Low back pain with sciatica, sciatica laterality unspecified, unspecified back pain laterality, unspecified chronicity    -  Primary ICD-10-CM: M54.40  ICD-9-CM: 724.3     Controlled type 2 diabetes mellitus without complication, without long-term current use of insulin (HCC)     ICD-10-CM: E11.9  ICD-9-CM: 250.00     Vitamin D deficiency     ICD-10-CM: E55.9  ICD-9-CM: 268.9

## 2022-10-12 ENCOUNTER — OFFICE VISIT (OUTPATIENT)
Dept: FAMILY MEDICINE CLINIC | Facility: CLINIC | Age: 72
End: 2022-10-12

## 2022-10-12 VITALS
RESPIRATION RATE: 16 BRPM | TEMPERATURE: 97.5 F | WEIGHT: 165.8 LBS | OXYGEN SATURATION: 97 % | HEIGHT: 64 IN | BODY MASS INDEX: 28.31 KG/M2

## 2022-10-12 DIAGNOSIS — R53.82 CHRONIC FATIGUE: ICD-10-CM

## 2022-10-12 DIAGNOSIS — R42 DIZZINESS: ICD-10-CM

## 2022-10-12 DIAGNOSIS — R42 VERTIGO: Primary | ICD-10-CM

## 2022-10-12 PROCEDURE — 99214 OFFICE O/P EST MOD 30 MIN: CPT | Performed by: NURSE PRACTITIONER

## 2022-10-12 PROCEDURE — 93000 ELECTROCARDIOGRAM COMPLETE: CPT | Performed by: NURSE PRACTITIONER

## 2022-10-12 NOTE — ASSESSMENT & PLAN NOTE
Refer to physical therapy for vestibular therapy.  Refer patient for carotid ultrasound.  Patient refused CT of head.  Advised patient if increased symptoms of dizziness chest pain shortness of air weakness to go to ER.  Discussed signs and symptoms of acute CVA.

## 2022-10-12 NOTE — PROGRESS NOTES
"Chief Complaint  Sway to side when walking (Swaying to the right, noticed when getting out of bed or sitting, going on for awhile, patient states he just feels funny, patient if he stays still for a minute the swaying isn't as bad. ) and Neck Pain (Muscle issue)    Subjective        Brian Garcia presents to [facility]  History of Present Illness  Patient presents to the office today for complaints of vertigo. He reports this has been bothering him since end of July. He had a CVA in 2021. No residual symptoms from CVA.   He reports the symptoms he had in July went away and will come back intermittently.  He has not been seen for the episodes of vertigo.  He reports that when laying back in his recliner and raising up he will notice dizziness.  He denies chest pain, palpitations, or shortness of air.  Blood pressure today is 112/58.  Patient reports that he had cervical surgery in the 80s.  He reports he was generalized neck pain which is controlled.          Objective   Vital Signs:  Temp 97.5 °F (36.4 °C)   Resp 16   Ht 162.6 cm (64\")   Wt 75.2 kg (165 lb 12.8 oz)   SpO2 97%   BMI 28.46 kg/m²   Estimated body mass index is 28.46 kg/m² as calculated from the following:    Height as of this encounter: 162.6 cm (64\").    Weight as of this encounter: 75.2 kg (165 lb 12.8 oz).    BMI is >= 25 and <30. (Overweight) The following options were offered after discussion;: weight loss educational material (shared in after visit summary), exercise counseling/recommendations and nutrition counseling/recommendations      Physical Exam  Constitutional:       General: He is not in acute distress.     Appearance: Normal appearance.   Eyes:      Pupils: Pupils are equal, round, and reactive to light.   Cardiovascular:      Rate and Rhythm: Normal rate and regular rhythm.      Pulses: Normal pulses.      Heart sounds: Normal heart sounds.   Pulmonary:      Effort: Pulmonary effort is normal.      " Breath sounds: Normal breath sounds. No wheezing or rales.   Neurological:      Mental Status: He is alert.   Psychiatric:         Mood and Affect: Mood normal.         Behavior: Behavior normal.        Result Review :  The following data was reviewed by: LUX Booth on 10/12/2022:  Common labs    Common Labs 12/9/21 12/9/21 5/26/22 5/26/22 5/26/22    1007 1007 0907 0907 0907   Glucose 94  122 (A)     BUN 23 (A)  16     Creatinine 0.9  0.96  72.7   Sodium 139  138     Potassium 4.4  4.6     Chloride 101  105     Calcium 9.3  9.1     Albumin   4.3     Total Bilirubin   0.4     Alkaline Phosphatase   32 (A)     AST (SGOT)   24     ALT (SGPT)   31     Hemoglobin A1C  7.4 (A)  7.4 (A)    Microalbumin, Urine     <1.2   (A) Abnormal value       Comments are available for some flowsheets but are not being displayed.               ECG 12 Lead    Date/Time: 10/12/2022 11:17 AM  Performed by: Khushboo Bray APRN  Authorized by: Khushboo Bray APRN   Comparison: compared with previous ECG from 3/3/2021  Similar to previous ECG  Rhythm: sinus rhythm  Rate: normal  BPM: 71  Conduction: conduction normal  QRS axis: normal  Other: no other findings    Clinical impression: normal ECG              Assessment and Plan   Diagnoses and all orders for this visit:    1. Vertigo (Primary)  Assessment & Plan:  Refer to physical therapy for vestibular therapy.  Refer patient for carotid ultrasound.  Patient refused CT of head.  Advised patient if increased symptoms of dizziness chest pain shortness of air weakness to go to ER.  Discussed signs and symptoms of acute CVA.      Orders:  -     Ambulatory Referral to Physical Therapy Vestibular, Evaluate and treat    2. Dizziness  Assessment & Plan:  Refer to physical therapy for vestibular therapy.  Refer patient for carotid ultrasound.  Patient refused CT of head.  Advised patient if increased symptoms of dizziness chest pain shortness of air weakness to go to ER.   Discussed signs and symptoms of acute CVA.      Orders:  -     Duplex Carotid Ultrasound CAR; Future  -     ECG 12 Lead    3. Chronic fatigue  Assessment & Plan:  Patient continues to take OTC vitamins.    Advised patient if he develops chest pain shortness of air to go to the ER.       I spent 30 minutes caring for Brian on this date of service. This time includes time spent by me in the following activities:preparing for the visit, reviewing tests, obtaining and/or reviewing a separately obtained history, performing a medically appropriate examination and/or evaluation , counseling and educating the patient/family/caregiver, ordering medications, tests, or procedures, referring and communicating with other health care professionals , documenting information in the medical record, independently interpreting results and communicating that information with the patient/family/caregiver and care coordination  Follow Up   Return in about 4 weeks (around 11/9/2022) for Recheck.  Patient was given instructions and counseling regarding his condition or for health maintenance advice. Please see specific information pulled into the AVS if appropriate.

## 2022-10-19 ENCOUNTER — HOSPITAL ENCOUNTER (OUTPATIENT)
Dept: CARDIOLOGY | Facility: HOSPITAL | Age: 72
Discharge: HOME | End: 2022-10-19
Admitting: NURSE PRACTITIONER

## 2022-10-19 DIAGNOSIS — R42 DIZZINESS: ICD-10-CM

## 2022-10-19 LAB
BH CV XLRA MEAS LEFT DIST CCA EDV: 16.2 CM/SEC
BH CV XLRA MEAS LEFT DIST CCA PSV: 64.1 CM/SEC
BH CV XLRA MEAS LEFT DIST ICA EDV: -25.1 CM/SEC
BH CV XLRA MEAS LEFT DIST ICA PSV: -68.3 CM/SEC
BH CV XLRA MEAS LEFT ICA/CCA RATIO: 1.07
BH CV XLRA MEAS LEFT MID ICA EDV: -22.4 CM/SEC
BH CV XLRA MEAS LEFT MID ICA PSV: -60.1 CM/SEC
BH CV XLRA MEAS LEFT PROX CCA EDV: 18.6 CM/SEC
BH CV XLRA MEAS LEFT PROX CCA PSV: 74.6 CM/SEC
BH CV XLRA MEAS LEFT PROX ECA EDV: -10.1 CM/SEC
BH CV XLRA MEAS LEFT PROX ECA PSV: -62.8 CM/SEC
BH CV XLRA MEAS LEFT PROX ICA EDV: -20.2 CM/SEC
BH CV XLRA MEAS LEFT PROX ICA PSV: -60.1 CM/SEC
BH CV XLRA MEAS LEFT PROX SCLA PSV: 114.9 CM/SEC
BH CV XLRA MEAS LEFT VERTEBRAL A EDV: 14.5 CM/SEC
BH CV XLRA MEAS LEFT VERTEBRAL A PSV: 44.7 CM/SEC
BH CV XLRA MEAS RIGHT DIST CCA EDV: 10.5 CM/SEC
BH CV XLRA MEAS RIGHT DIST CCA PSV: 55.8 CM/SEC
BH CV XLRA MEAS RIGHT DIST ICA EDV: -22.1 CM/SEC
BH CV XLRA MEAS RIGHT DIST ICA PSV: -62.4 CM/SEC
BH CV XLRA MEAS RIGHT ICA/CCA RATIO: 1.23
BH CV XLRA MEAS RIGHT MID ICA EDV: -24.1 CM/SEC
BH CV XLRA MEAS RIGHT MID ICA PSV: -68.8 CM/SEC
BH CV XLRA MEAS RIGHT PROX CCA EDV: 17.4 CM/SEC
BH CV XLRA MEAS RIGHT PROX CCA PSV: 86.4 CM/SEC
BH CV XLRA MEAS RIGHT PROX ECA EDV: -10.3 CM/SEC
BH CV XLRA MEAS RIGHT PROX ECA PSV: -85 CM/SEC
BH CV XLRA MEAS RIGHT PROX ICA EDV: -19.7 CM/SEC
BH CV XLRA MEAS RIGHT PROX ICA PSV: -58.5 CM/SEC
BH CV XLRA MEAS RIGHT PROX SCLA PSV: 75.2 CM/SEC
BH CV XLRA MEAS RIGHT VERTEBRAL A EDV: 13.8 CM/SEC
BH CV XLRA MEAS RIGHT VERTEBRAL A PSV: 52.6 CM/SEC
LEFT ARM BP: NORMAL MMHG
MAXIMAL PREDICTED HEART RATE: 149 BPM
RIGHT ARM BP: NORMAL MMHG
STRESS TARGET HR: 127 BPM

## 2022-10-19 PROCEDURE — 93880 EXTRACRANIAL BILAT STUDY: CPT

## 2022-10-28 ENCOUNTER — HOSPITAL ENCOUNTER (OUTPATIENT)
Dept: PHYSICAL THERAPY | Facility: HOSPITAL | Age: 72
Setting detail: THERAPIES SERIES
Discharge: HOME | End: 2022-10-28

## 2022-10-28 DIAGNOSIS — R42 DIZZINESS: Primary | ICD-10-CM

## 2022-10-28 PROCEDURE — 97163 PT EVAL HIGH COMPLEX 45 MIN: CPT

## 2022-10-28 NOTE — THERAPY EVALUATION
Outpatient Physical Therapy Vestibular Initial Evaluation  [facility]     Patient Name: Brian Garcia  : 1950  MRN: [id number]  Today's Date: 10/28/2022      Visit Date: 10/28/2022    Patient Active Problem List   Diagnosis   • Acute bronchitis   • Acute pharyngitis   • Acute upper respiratory infection   • Allergic rhinitis   • Carpal tunnel syndrome   • Cough   • Alimentary obesity   • Fatigue   • Effusion of knee   • ED (erectile dysfunction) of organic origin   • Muscle ache   • Thumb pain   • Welcome to Medicare preventive visit   • Controlled type 2 diabetes mellitus without complication, without long-term current use of insulin (HCC)   • Hyperlipidemia   • Vitamin D deficiency   • Refusal of statin medication by patient   • Medication management   • Male hypogonadism   • Low testosterone   • Benign prostatic hyperplasia without lower urinary tract symptoms   • Hives   • Dizziness   • Slurred speech, transient   • Impaired left ventricular relaxation   • TIA (transient ischemic attack)   • Nonrheumatic tricuspid valve regurgitation   • Bladder cancer (HCC)   • Fever blister        Past Medical History:   Diagnosis Date   • Bladder cancer (HCC) 2021   • Bladder tumor    • Diabetes mellitus (HCC)    • History of asthma    • History of cardiac murmur as a child    • History of skin cancer    • Hyperlipidemia    • Nonrheumatic tricuspid valve regurgitation 2021   • TIA (transient ischemic attack) 2021   • Type 2 diabetes mellitus (HCC)         Past Surgical History:   Procedure Laterality Date   • ADENOIDECTOMY     • APPENDECTOMY     • CATARACT EXTRACTION, BILATERAL     • CERVICAL SPINE SURGERY     • LASIK Bilateral    • TONSILLECTOMY     • TRANSURETHRAL RESECTION OF BLADDER TUMOR N/A 2020    Procedure: TRANSURETHRAL RESECTION OF BLADDER TUMOR;  Surgeon: Leandro Rodas MD;  Location: [facility];  Service: Urology;  Laterality: N/A;         Visit Dx:     ICD-10-CM  "ICD-9-CM   1. Dizziness  R42 780.4            Vestibular Eval     Row Name 10/28/22 1000             Subjective Comments    Subjective Comments Brian Garcia is a 72 y.o. male who presents today with Vertigo. Patient first reports symptoms occurred in February of 2021. At that time he was lying on his back working under a car and when he got up he had a \"funny feeling\" in his head and had trouble with walking in a straight line. He went in and sat down and the symptoms subsided until the next morning when he sat up in bed and the symptoms started again. His daughter who is an EMT checked him out and he had some orthostatic BP changes, though he is not sure whether it increased or decreased. He was taken to the hospital under the suspicion of a CVA vs. Vertigo. CT angiogram and MRI were negative other than mild age related changes. During physical exam he mentioned that his sensation was slightly different form L to R leg and was diagnosed with a TIA, but on later reflection thinks this was related to his cervical spine issues. His symptoms resolved and did not occur again until July 2022. It is the same \"funny feeling\" in his head that is intense with turning his head in bed, sitting up from bed quickly. When it occurs he has trouble with walking a straight path, but if he sits still the intense feeling will pass and his balance will be normal but he will still have a slight \"funny feeling\" for several hours. Sometimes it will not happen for weeks and then will come again. He reports evaluating himself and noting no other symptoms indicating CVA when his dizziness is present. PMH includes possible TIA in 2021, cervical surgery in 1980s, neck pain, B sensorineural hearing loss, LBP with sciatica, and T2DM.  -LW         Vital Signs    Pre Systolic BP Rehab 120  Sitting  -LW      Pre Treatment Diastolic BP 70  Sitting  -LW      Intra Systolic BP Rehab 125  Standing  -LW      Intra Treatment Diastolic BP 80  Standing  " -LW         Vestibular Objective    Fall History None in a few years  -LW         Symptom Behavior    Frequency of Dizziness Other  -LW      Duration of Dizziness Minutes  For intense symptoms/balance difficulties, feeling off will often last the rest of the day.  -LW      Aggravating Factors --  lying back, sitting up, quick movements. Sometimes with turning head over in bed.  -LW      Relieving Factors --  staying still.  -LW         Occulomotor Exam Fixation Present    Occular ROM Normal  -LW      Spontaneous Nystagmus Absent  -LW      Gaze-induced Nystagmus Absent  -LW      Smooth Pursuit Normal  -LW      Saccades Intact  1 saccade with vertical  -LW      Convergence Normal  8 cm, 7 cm, 7 cm.  -LW         Vestibulo-Occular Reflex (VOR)    VOR 1 Head Only Horizontal and vertical no symptoms other than neck stiffness.  -LW         Positional Testing    Vertebrobasilar Artery Screen - Right Negative  -LW      Vertebrobasilar Artery Screen - Left Negative  -LW      Charlotte-Hallpike Right No nystagmus  -LW      Charlotte-Hallpike Left No nystagmus  -LW      Horizontal Roll Test Right No nystagmus  -LW      Horizontal Roll Test Left No nystagmus  -LW         General ROM    GENERAL ROM COMMENTS Limited cervical extension and rotation due to fusion.  -LW         Cervicogenic Assessment    Cervicogenic Assess No change in symptoms with cervical screening.  -LW            User Key  (r) = Recorded By, (t) = Taken By, (c) = Cosigned By    Initials Name Provider Type    Eugenia Esparza, PT Physical Therapist                            Therapy Education  Education Details: Educated on anatomy/pathology, evaluation findings, therapy expectations, POC  Given: Symptoms/condition management  Program: New  How Provided: Verbal  Provided to: Patient  Level of Understanding: Verbalized       OP Exercises     Row Name 10/28/22 1000             Subjective Comments    Subjective Comments Brian Garcia is a 72 y.o. male who presents today with  "Vertigo. Patient first reports symptoms occurred in February of 2021. At that time he was lying on his back working under a car and when he got up he had a \"funny feeling\" in his head and had trouble with walking in a straight line. He went in and sat down and the symptoms subsided until the next morning when he sat up in bed and the symptoms started again. His daughter who is an EMT checked him out and he had some orthostatic BP changes, though he is not sure whether it increased or decreased. He was taken to the hospital under the suspicion of a CVA vs. Vertigo. CT angiogram and MRI were negative other than mild age related changes. During physical exam he mentioned that his sensation was slightly different form L to R leg and was diagnosed with a TIA, but on later reflection thinks this was related to his cervical spine issues. His symptoms resolved and did not occur again until July 2022. It is the same \"funny feeling\" in his head that is intense with turning his head in bed, sitting up from bed quickly. When it occurs he has trouble with walking a straight path, but if he sits still the intense feeling will pass and his balance will be normal but he will still have a slight \"funny feeling\" for several hours. Sometimes it will not happen for weeks and then will come again. He reports evaluating himself and noting no other symptoms indicating CVA when his dizziness is present. PMH includes possible TIA in 2021, cervical surgery in 1980s, neck pain, B sensorineural hearing loss, LBP with sciatica, and T2DM.  -MIKE            User Key  (r) = Recorded By, (t) = Taken By, (c) = Cosigned By    Initials Name Provider Type    Eugenia Esparza, PT Physical Therapist                             PT OP Goals     Row Name 10/28/22 1200          PT Short Term Goals    STG Date to Achieve 11/27/22  -MIKE     STG 1 Patient will be independent with initial HEP for self-managment of dizziness/balance deficits.  -MIKE     STG 1 Progress " New  -LW        Long Term Goals    LTG Date to Achieve 12/27/22  -LW     LTG 1 Patient will be independent with finalized HEP to maintain function and prevent return of symptoms.  -LW     LTG 1 Progress New  -LW     LTG 2 Patient will score 0% handicap on DHI to demonstrate resolution of symptoms.  -LW     LTG 2 Progress New  -LW     LTG 3 Patient will report ability to resolve symptoms independently.  -LW     LTG 3 Progress New  -LW        Time Calculation    PT Goal Re-Cert Due Date 01/26/23  -LW           User Key  (r) = Recorded By, (t) = Taken By, (c) = Cosigned By    Initials Name Provider Type    LW Eugenia Delarosa, PT Physical Therapist                 PT Assessment/Plan     Row Name 10/28/22 1200          PT Assessment    Functional Limitations Impaired gait;Limitations in functional capacity and performance  -LW     Impairments Balance;Gait  Dizziness  -LW     Assessment Comments Brian Garcia is a 72 y.o. male referred to physical therapy for vertigo. He presents with an unstable clinical presentation, along with comorbidities of Cervical and lumbar spine deficits, possible TIA in 2021, B hearing loss, T2DM and no remarkable personal factors that may impact his progress in the plan of care. Pt presents today with no symptoms elicited with oculomotor testing, orthostatics, positional testing, VBI screening, or with cervical assessment. 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 The previous impairments limit his ability to walk safely or perform daily activities when symptoms are present. Pt will benefit from skilled PT to address the previous impairments and return to PLOF.  -LW     Please refer to paper survey for additional self-reported information Yes  -LW     Rehab Potential Good  -LW     Patient/caregiver participated in establishment of treatment plan and goals Yes  -LW     Patient would benefit from skilled  therapy intervention Yes  -LW        PT Plan    PT Frequency 2x/week  -LW     Predicted Duration of Therapy Intervention (PT) 1-3 visits over 6-8 weeks  -LW     Planned CPT's? PT EVAL HIGH COMPLEXITY: 68651;PT NEUROMUSC RE-EDUCATION EA 15 MIN: 61333;PT THER ACT EA 15 MIN: 45385;PT CANALITH REPOSITIONIN;PT RE-EVAL: 39591;PT SELF CARE/HOME MGMT/TRAIN EA 15: 43243;PT MANUAL THERAPY EA 15 MIN: 92872  -LW     PT Plan Comments Patient to call and schedule when he is having symptoms to confirm diagnosis and teach self-treatment.  -LW           User Key  (r) = Recorded By, (t) = Taken By, (c) = Cosigned By    Initials Name Provider Type    Eugenia Esparza, PT Physical Therapist                 Outcome Measure Options: Dizziness Handicap Inventory  Dizziness Handicap Inventory Score: 6         Time Calculation:   Start Time: 1035  Stop Time: 1119  Time Calculation (min): 44 min  Untimed Charges  PT Eval/Re-eval Minutes: 44  Total Minutes  Untimed Charges Total Minutes: 44   Total Minutes: 44   Therapy Charges for Today     Code Description Service Date Service Provider Modifiers Qty    [id number]  PT EVAL HIGH COMPLEXITY 3 10/28/2022 Eugenia Delarosa, PT GP 1          PT G-Codes  Outcome Measure Options: Dizziness Handicap Inventory         Eugenia Delraosa PT  10/28/2022

## 2022-11-09 ENCOUNTER — OFFICE VISIT (OUTPATIENT)
Dept: FAMILY MEDICINE CLINIC | Facility: CLINIC | Age: 72
End: 2022-11-09

## 2022-11-09 VITALS
TEMPERATURE: 97.8 F | DIASTOLIC BLOOD PRESSURE: 74 MMHG | HEIGHT: 64 IN | HEART RATE: 79 BPM | BODY MASS INDEX: 28.75 KG/M2 | SYSTOLIC BLOOD PRESSURE: 128 MMHG | WEIGHT: 168.4 LBS | OXYGEN SATURATION: 98 %

## 2022-11-09 DIAGNOSIS — J30.1 SEASONAL ALLERGIC RHINITIS DUE TO POLLEN: ICD-10-CM

## 2022-11-09 DIAGNOSIS — E11.9 CONTROLLED TYPE 2 DIABETES MELLITUS WITHOUT COMPLICATION, WITHOUT LONG-TERM CURRENT USE OF INSULIN: ICD-10-CM

## 2022-11-09 DIAGNOSIS — R53.82 CHRONIC FATIGUE: ICD-10-CM

## 2022-11-09 DIAGNOSIS — R42 DIZZINESS: Primary | ICD-10-CM

## 2022-11-09 DIAGNOSIS — H61.23 BILATERAL IMPACTED CERUMEN: ICD-10-CM

## 2022-11-09 PROBLEM — Z12.5 SCREENING PSA (PROSTATE SPECIFIC ANTIGEN): Status: ACTIVE | Noted: 2018-07-18

## 2022-11-09 PROBLEM — Z12.5 SCREENING PSA (PROSTATE SPECIFIC ANTIGEN): Status: RESOLVED | Noted: 2018-07-18 | Resolved: 2022-11-09

## 2022-11-09 PROCEDURE — 69209 REMOVE IMPACTED EAR WAX UNI: CPT | Performed by: NURSE PRACTITIONER

## 2022-11-09 PROCEDURE — 99214 OFFICE O/P EST MOD 30 MIN: CPT | Performed by: NURSE PRACTITIONER

## 2022-11-09 NOTE — ASSESSMENT & PLAN NOTE
Patient denies chest pain shortness of air.  Cerumen impaction noted during office visit.  Carotid ultrasound showing mild stenosis.

## 2022-11-09 NOTE — PROGRESS NOTES
"Chief Complaint  Dizziness    Subjective        Brian Garcia presents to [facility]  History of Present Illness  Patient presents office today for follow-up on dizziness.  He has been thoroughly worked up by physical therapy and had a recent carotid ultrasound.  Physical therapy reported negative for benign positional vertigo, however, physical therapy explained to patient that when he is actively having vertigo to make an appointment with them.  He shows mild stenosis on carotid ultrasound.  Patient follows endocrinology for diabetes.  Patient is seeing ENT as needed.  Blood pressure today is 120/74 he denies chest pain shortness of air.  During physical assessment patient has cerumen impaction bilateral ears.        Objective   Vital Signs:  /74 (BP Location: Left arm, Patient Position: Sitting, Cuff Size: Adult)   Pulse 79   Temp 97.8 °F (36.6 °C)   Ht 162.6 cm (64\")   Wt 76.4 kg (168 lb 6.4 oz)   SpO2 98%   BMI 28.91 kg/m²   Estimated body mass index is 28.91 kg/m² as calculated from the following:    Height as of this encounter: 162.6 cm (64\").    Weight as of this encounter: 76.4 kg (168 lb 6.4 oz).    BMI is >= 25 and <30. (Overweight) The following options were offered after discussion;: weight loss educational material (shared in after visit summary), exercise counseling/recommendations and nutrition counseling/recommendations      Physical Exam  Constitutional:       General: He is not in acute distress.     Appearance: Normal appearance.   HENT:      Right Ear: Decreased hearing noted. No tenderness. There is impacted cerumen. No foreign body. Tympanic membrane is not perforated, erythematous, retracted or bulging. Tympanic membrane has normal mobility.      Left Ear: Decreased hearing noted. No tenderness. There is impacted cerumen. No foreign body. Tympanic membrane is not perforated, erythematous, retracted or bulging. Tympanic membrane has normal mobility. "   Eyes:      Pupils: Pupils are equal, round, and reactive to light.   Cardiovascular:      Rate and Rhythm: Normal rate and regular rhythm.      Pulses: Normal pulses.      Heart sounds: Normal heart sounds.   Pulmonary:      Effort: Pulmonary effort is normal.      Breath sounds: Normal breath sounds. No wheezing or rales.   Neurological:      Mental Status: He is alert.   Psychiatric:         Mood and Affect: Mood normal.         Behavior: Behavior normal.        Result Review :  The following data was reviewed by: LUX Booth on 11/09/2022:  Common labs    Common Labs 12/9/21 12/9/21 5/26/22 5/26/22 5/26/22    1007 1007 0907 0907 0907   Glucose 94  122 (A)     BUN 23 (A)  16     Creatinine 0.9  0.96  72.7   Sodium 139  138     Potassium 4.4  4.6     Chloride 101  105     Calcium 9.3  9.1     Albumin   4.3     Total Bilirubin   0.4     Alkaline Phosphatase   32 (A)     AST (SGOT)   24     ALT (SGPT)   31     Hemoglobin A1C  7.4 (A)  7.4 (A)    Microalbumin, Urine     <1.2   (A) Abnormal value       Comments are available for some flowsheets but are not being displayed.           Data reviewed: Cardiology studies EKG and ECHO   Ear Cerumen Removal    Date/Time: 11/9/2022 10:28 AM  Performed by: Khushboo Bray APRN  Authorized by: Khushboo Bray APRN   Consent: Verbal consent obtained.  Risks and benefits: risks, benefits and alternatives were discussed  Consent given by: patient  Patient understanding: patient states understanding of the procedure being performed  Patient consent: the patient's understanding of the procedure matches consent given  Procedure consent: procedure consent matches procedure scheduled  Test results: test results available and properly labeled  Required items: required blood products, implants, devices, and special equipment available  Patient identity confirmed: verbally with patient    Anesthesia:  Local Anesthetic: none  Location details: left ear and right  ear  Patient tolerance: patient tolerated the procedure well with no immediate complications  Procedure type: irrigation   Sedation:  Patient sedated: no              Assessment and Plan   Diagnoses and all orders for this visit:    1. Dizziness (Primary)  Assessment & Plan:  Patient denies chest pain shortness of air.  Cerumen impaction noted during office visit.  Carotid ultrasound showing mild stenosis.      2. Chronic fatigue  Assessment & Plan:  Continue daily vitamins.      3. Seasonal allergic rhinitis due to pollen    4. Controlled type 2 diabetes mellitus without complication, without long-term current use of insulin ([facility])  Assessment & Plan:  Follows endocrinology.      5. Bilateral impacted cerumen  Assessment & Plan:  Patient tolerated procedure without difficulty.    Orders:  -     Cerumen Removal         I spent 30 minutes caring for Brian on this date of service. This time includes time spent by me in the following activities:preparing for the visit, reviewing tests, obtaining and/or reviewing a separately obtained history, performing a medically appropriate examination and/or evaluation , counseling and educating the patient/family/caregiver, ordering medications, tests, or procedures, documenting information in the medical record, independently interpreting results and communicating that information with the patient/family/caregiver and care coordination  Follow Up   Return in about 6 months (around 5/9/2023), or if symptoms worsen or fail to improve.  Patient was given instructions and counseling regarding his condition or for health maintenance advice. Please see specific information pulled into the AVS if appropriate.

## 2022-11-22 ENCOUNTER — OFFICE VISIT (OUTPATIENT)
Dept: FAMILY MEDICINE CLINIC | Facility: CLINIC | Age: 72
End: 2022-11-22

## 2022-11-22 VITALS
DIASTOLIC BLOOD PRESSURE: 74 MMHG | OXYGEN SATURATION: 96 % | SYSTOLIC BLOOD PRESSURE: 130 MMHG | HEIGHT: 64 IN | WEIGHT: 168.2 LBS | HEART RATE: 78 BPM | BODY MASS INDEX: 28.71 KG/M2 | TEMPERATURE: 97.5 F

## 2022-11-22 DIAGNOSIS — J30.1 SEASONAL ALLERGIC RHINITIS DUE TO POLLEN: ICD-10-CM

## 2022-11-22 DIAGNOSIS — G89.29 CHRONIC RIGHT-SIDED LOW BACK PAIN WITHOUT SCIATICA: Primary | ICD-10-CM

## 2022-11-22 DIAGNOSIS — M54.50 CHRONIC RIGHT-SIDED LOW BACK PAIN WITHOUT SCIATICA: Primary | ICD-10-CM

## 2022-11-22 PROCEDURE — 99213 OFFICE O/P EST LOW 20 MIN: CPT | Performed by: NURSE PRACTITIONER

## 2022-11-22 NOTE — PROGRESS NOTES
"Chief Complaint  Back Pain (Continuing pain on right side of back, is wondering if he has a kidney stone)    Subjective        Brian Garcia presents to [facility]  History of Present Illness  Patient presents office today for right-sided back pain.  Patient is questioning if he has a kidney stone.  Patient is unable to leave a urine sample today.  Patient denies dysuria, urinary frequency and/or hesitancy.  He denies fever or chills.  He denies nausea or vomiting.  He denies blood in his urine.  Patient reports back pain is not active right now just comes and goes.  Patient denies any known injury.  Patient reports that he was shooting manolo pigeons and noticed the same type of pain he will take Aleve and it is improved.  Blood pressure today is 130/74.  His chest pain shortness of air.              Objective   Vital Signs:  /74 (BP Location: Left arm, Patient Position: Sitting, Cuff Size: Adult)   Pulse 78   Temp 97.5 °F (36.4 °C)   Ht 162.6 cm (64\")   Wt 76.3 kg (168 lb 3.2 oz)   SpO2 96%   BMI 28.87 kg/m²   Estimated body mass index is 28.87 kg/m² as calculated from the following:    Height as of this encounter: 162.6 cm (64\").    Weight as of this encounter: 76.3 kg (168 lb 3.2 oz).    BMI is >= 25 and <30. (Overweight) The following options were offered after discussion;: weight loss educational material (shared in after visit summary), exercise counseling/recommendations and nutrition counseling/recommendations      Physical Exam  Constitutional:       Appearance: Normal appearance.   HENT:      Head: Normocephalic.   Cardiovascular:      Rate and Rhythm: Normal rate and regular rhythm.   Pulmonary:      Effort: Pulmonary effort is normal. No respiratory distress.      Breath sounds: Normal breath sounds. No wheezing.   Abdominal:      General: Abdomen is flat.      Palpations: Abdomen is soft. There is no mass.      Tenderness: There is no abdominal tenderness.      " Hernia: No hernia is present.   Musculoskeletal:         General: Tenderness present.      Cervical back: Normal. No spasms or tenderness. Normal range of motion.      Thoracic back: Normal. No spasms or tenderness. Normal range of motion.      Lumbar back: Spasms and tenderness present. No edema or bony tenderness. Decreased range of motion. No scoliosis.   Neurological:      Mental Status: He is alert.        Result Review :  The following data was reviewed by: LUX Booth on 11/22/2022:  Common labs    Common Labs 12/9/21 12/9/21 5/26/22 5/26/22 5/26/22    1007 1007 0907 0907 0907   Glucose 94  122 (A)     BUN 23 (A)  16     Creatinine 0.9  0.96  72.7   Sodium 139  138     Potassium 4.4  4.6     Chloride 101  105     Calcium 9.3  9.1     Albumin   4.3     Total Bilirubin   0.4     Alkaline Phosphatase   32 (A)     AST (SGOT)   24     ALT (SGPT)   31     Hemoglobin A1C  7.4 (A)  7.4 (A)    Microalbumin, Urine     <1.2   (A) Abnormal value       Comments are available for some flowsheets but are not being displayed.                Assessment and Plan   Diagnoses and all orders for this visit:    1. Chronic right-sided low back pain without sciatica (Primary)  Assessment & Plan:  Advised to use a heating pad and/or ice pack.  Take Aleve only as needed.      2. Seasonal allergic rhinitis due to pollen  Assessment & Plan:  Patient stable taking Flonase.           I spent 30 minutes caring for Jack on this date of service. This time includes time spent by me in the following activities:preparing for the visit, reviewing tests, obtaining and/or reviewing a separately obtained history, performing a medically appropriate examination and/or evaluation , counseling and educating the patient/family/caregiver, ordering medications, tests, or procedures, documenting information in the medical record, independently interpreting results and communicating that information with the patient/family/caregiver and care  coordination  Follow Up   No follow-ups on file.  Patient was given instructions and counseling regarding his condition or for health maintenance advice. Please see specific information pulled into the AVS if appropriate.

## 2022-11-23 ENCOUNTER — TELEPHONE (OUTPATIENT)
Dept: FAMILY MEDICINE CLINIC | Facility: CLINIC | Age: 72
End: 2022-11-23

## 2022-11-23 RX ORDER — AZITHROMYCIN 250 MG/1
TABLET, FILM COATED ORAL
Qty: 6 TABLET | Refills: 0 | Status: SHIPPED | OUTPATIENT
Start: 2022-11-23 | End: 2022-12-09

## 2022-11-23 NOTE — TELEPHONE ENCOUNTER
Caller: Jose Brian SHAY    Relationship: Self    Best call back number: [phone]  What is the best time to reach you: ANYTIME     Who are you requesting to speak with (clinical staff, provider,  specific staff member):CLINICAL STAFF  Do you know the name of the person who called: SELF  What was the call regarding: PATIENT CALLED AND STATED HE WOKE UP WITH SINUS INFECTION SYMPTOMS, HEAD CONGESTION,RUNNY AND STUFF NOSE. PATIENTS WOULD LIKE FOR YOU TO CALL IN A Z-CORBY FOR HIM AT [employer] ON THE OUTER LOOP. PATIENTS STATES THIS HELPED HIM LAST TIME THIS HAPPENED. THANKS  Do you require a callback:YES

## 2022-11-28 ENCOUNTER — TELEPHONE (OUTPATIENT)
Dept: FAMILY MEDICINE CLINIC | Facility: CLINIC | Age: 72
End: 2022-11-28

## 2022-11-28 NOTE — TELEPHONE ENCOUNTER
PATIENT STATES LAST WEEK HE STARTED TO FEEL BAD WITH A COUGH, HEAD PRESSURE ETC. HE REQUESTED DR. HAMILTON TO CALL HIM IN A ZPAK ANTIBIOTIC IN WHICH HE DID. PATIENT DID COVID TEST TWO DIFFERENT TIMES AND BOTH WERE NEGATIVE, HE WAS VERY SICK SO HE BELIEVES HE HAD THE FLU HE HAS NEVER HAD IT BEFORE BUT WITH HOW SICK HE WAS HE THINKS IT HAD TO OF BEEN. HIS FIRST DAY WITHOUT A FEVER WAS Saturday AND HE FINISHED THE ANTIBIOTIC ON Sunday. HE FEELS BETTER BUT STILL HAS COUGH AND SOME CONGESTION AND FEELS HE COULD USE ANOTHER ROUND OF ANTIBIOTIC TO END IT AND KEEP IT AWAY. PLEASE CALL SOMETHING ELSE OR REFILL ZPAK.     [facility] [employer] [place], [place] - [address] - [phone] [facility] [phone]   [phone]

## 2022-11-28 NOTE — TELEPHONE ENCOUNTER
Please advise patient that we cannot refill antibiotics.  If he has completed the Z-Jeferson please let him know that the antibiotic continues to work for 5 days after the last dose.  Therefore, the medication is still working.  If he is developing worsening signs or symptoms cough and congestion please advise to make an appointment at this office or urgent care.

## 2022-12-07 ENCOUNTER — TELEPHONE (OUTPATIENT)
Dept: FAMILY MEDICINE CLINIC | Facility: CLINIC | Age: 72
End: 2022-12-07

## 2022-12-07 DIAGNOSIS — J02.9 SORE THROAT: Primary | ICD-10-CM

## 2022-12-07 NOTE — TELEPHONE ENCOUNTER
Caller: Brian Garcia    Relationship: Self    Best call back number: [phone]    What is the best time to reach you: ANYTIME     Who are you requesting to speak with (clinical staff, provider,  specific staff member): CLINICAL STAFF     What was the call regarding: PATIENT STATES THAT SINCE TAKING THE ZPAK THAT WAS PRESCRIBED TO HIM, HE HAS BEEN FEELING BETTER BUT IT STILL EXPERIENCING SOME SYMPTOMS.     PATIENT STATES THAT HIS THROAT IS STILL SWOLLEN AND HE ISN'T BACK TO NORMAL.     PATIENT WOULD LIKE TO KNOW IF HE NEEDS TO BE SEEN OR IF ANOTHER MEDICATION COULD BE CALLED IN TO HELP WITH HIS REMAINING SYMPTOMS.     PLEASE ADVISE     Do you require a callback: YES

## 2022-12-07 NOTE — TELEPHONE ENCOUNTER
Please advise patient that the Z-Jeferson will continue to work after his last dose for about 5 to 6 days.  Patient to increase vitamins, fluids, and gargle salt water for a sore throat.  If patient did not have a strep test at his visit I can order that for him please advise

## 2022-12-09 ENCOUNTER — OFFICE VISIT (OUTPATIENT)
Dept: FAMILY MEDICINE CLINIC | Facility: CLINIC | Age: 72
End: 2022-12-09

## 2022-12-09 VITALS
DIASTOLIC BLOOD PRESSURE: 72 MMHG | SYSTOLIC BLOOD PRESSURE: 128 MMHG | OXYGEN SATURATION: 97 % | TEMPERATURE: 97.9 F | WEIGHT: 167 LBS | HEIGHT: 64 IN | HEART RATE: 90 BPM | BODY MASS INDEX: 28.51 KG/M2

## 2022-12-09 DIAGNOSIS — J02.9 SORE THROAT: ICD-10-CM

## 2022-12-09 DIAGNOSIS — J01.00 SUBACUTE MAXILLARY SINUSITIS: Primary | ICD-10-CM

## 2022-12-09 DIAGNOSIS — B37.0 THRUSH: ICD-10-CM

## 2022-12-09 LAB
EXPIRATION DATE: NORMAL
FLUAV AG UPPER RESP QL IA.RAPID: NOT DETECTED
FLUBV AG UPPER RESP QL IA.RAPID: NOT DETECTED
INTERNAL CONTROL: NORMAL
Lab: NORMAL
SARS-COV-2 RNA RESP QL NAA+PROBE: NOT DETECTED

## 2022-12-09 PROCEDURE — 87428 SARSCOV & INF VIR A&B AG IA: CPT | Performed by: INTERNAL MEDICINE

## 2022-12-09 PROCEDURE — 99213 OFFICE O/P EST LOW 20 MIN: CPT | Performed by: INTERNAL MEDICINE

## 2022-12-09 RX ORDER — FLUCONAZOLE 100 MG/1
100 TABLET ORAL DAILY
Qty: 14 TABLET | Refills: 3 | Status: SHIPPED | OUTPATIENT
Start: 2022-12-09

## 2022-12-09 NOTE — PROGRESS NOTES
"Chief Complaint  Sore Throat and Sinus Problem    Subjective        Brian Garcia presents to [facility]  History of Present Illness patient was seen for sinusitis.  Patient has been treated with a Z-Jeferson without relief.  Patient also has severe sore throat and did not improve.  Patient will like he had thrush on his tongue and RISA was being ordered.  Patient is also having a flu COVID test.  Patient had COVID tested at home 3 times and was negative.  Patient's wife did test positive for COVID.    RISA is positive we will give Diflucan 100 mg daily for 2 weeks and repeat testing.    Dictated utilizing Dragon dictation. If there are questions or for further clarification, please contact me.    Objective   Vital Signs:  Blood Pressure 128/72   Pulse 90   Temperature 97.9 °F (36.6 °C)   Height 162.6 cm (64\")   Weight 75.8 kg (167 lb)   Oxygen Saturation 97%   Body Mass Index 28.67 kg/m²   Estimated body mass index is 28.67 kg/m² as calculated from the following:    Height as of this encounter: 162.6 cm (64\").    Weight as of this encounter: 75.8 kg (167 lb).          Physical Exam  Vitals and nursing note reviewed.   Constitutional:       Appearance: Normal appearance. He is well-developed.   HENT:      Head: Normocephalic and atraumatic.      Nose: Nose normal.      Mouth/Throat:      Mouth: Mucous membranes are moist.      Pharynx: Oropharyngeal exudate present.   Eyes:      Extraocular Movements: Extraocular movements intact.      Conjunctiva/sclera: Conjunctivae normal.      Pupils: Pupils are equal, round, and reactive to light.   Cardiovascular:      Rate and Rhythm: Normal rate and regular rhythm.      Heart sounds: Normal heart sounds. No murmur heard.    No friction rub. No gallop.   Pulmonary:      Effort: Pulmonary effort is normal. No respiratory distress.      Breath sounds: Normal breath sounds. No stridor. No wheezing, rhonchi or rales.   Chest:      Chest wall: No " tenderness.   Abdominal:      General: Bowel sounds are normal.      Palpations: Abdomen is soft.   Musculoskeletal:         General: Normal range of motion.      Cervical back: Normal range of motion and neck supple.   Skin:     General: Skin is warm and dry.   Neurological:      General: No focal deficit present.      Mental Status: He is alert and oriented to person, place, and time. Mental status is at baseline.   Psychiatric:         Mood and Affect: Mood normal.         Behavior: Behavior normal.         Thought Content: Thought content normal.         Judgment: Judgment normal.        Result Review :                Assessment and Plan  COVID flu testing, KOH tongue  Diagnoses and all orders for this visit:    1. Subacute maxillary sinusitis (Primary)  -     Covid-19 + Flu A&B AG, Veritor  -     Cancel: KOH Prep - Swab, Tongue; Future    2. Sore throat  -     Covid-19 + Flu A&B AG, Veritor  -     Cancel: KOH Prep - Swab, Tongue; Future  -     Throat / Upper Respiratory Culture - Swab, Throat; Future    3. Thrush  -     Covid-19 + Flu A&B AG, Veritor  -     Cancel: KOH Prep - Swab, Tongue; Future  -     Throat / Upper Respiratory Culture - Swab, Throat; Future    Other orders  -     Cancel: Covid-19 + Flu A&B AG, Veritor             Follow Up   Return in about 2 weeks (around 12/23/2022), or if symptoms worsen or fail to improve, for Recheck.  Patient was given instructions and counseling regarding his condition or for health maintenance advice. Please see specific information pulled into the AVS if appropriate.

## 2022-12-12 LAB
BACTERIA SPEC RESP CULT: NORMAL
BACTERIA SPEC RESP CULT: NORMAL

## 2022-12-14 ENCOUNTER — TELEPHONE (OUTPATIENT)
Dept: FAMILY MEDICINE CLINIC | Facility: CLINIC | Age: 72
End: 2022-12-14

## 2022-12-29 RX ORDER — PRAVASTATIN SODIUM 40 MG
40 TABLET ORAL NIGHTLY
Qty: 90 TABLET | Refills: 1 | Status: SHIPPED | OUTPATIENT
Start: 2022-12-29

## 2022-12-29 NOTE — TELEPHONE ENCOUNTER
Caller: JoseBrian    Relationship: Self    Best call back number: [phone]    Requested Prescriptions:   Requested Prescriptions     Pending Prescriptions Disp Refills   • pravastatin (PRAVACHOL) 40 MG tablet 90 tablet 1        Pharmacy where request should be sent: [id number] [address] AT [facility] [address] [facility] - [phone] [facility] [phone] FX     Additional details provided by patient: PATIENT STATES THAT HE HAS ANOTHER WEEK BEFORE HE WILL NEED THIS PRESCRIPTION REFILLED.     Does the patient have less than a 3 day supply:  [] Yes  [x] No    Would you like a call back once the refill request has been completed: [] Yes [] No    If the office needs to give you a call back, can they leave a voicemail: [] Yes [] No    Jaswinder Ross Rep   12/29/22 08:27 EST

## 2023-02-15 ENCOUNTER — OFFICE VISIT (OUTPATIENT)
Dept: FAMILY MEDICINE CLINIC | Facility: CLINIC | Age: 73
End: 2023-02-15
Payer: MEDICARE

## 2023-02-15 VITALS
DIASTOLIC BLOOD PRESSURE: 80 MMHG | SYSTOLIC BLOOD PRESSURE: 121 MMHG | WEIGHT: 167 LBS | HEART RATE: 91 BPM | BODY MASS INDEX: 28.51 KG/M2 | HEIGHT: 64 IN | OXYGEN SATURATION: 96 % | TEMPERATURE: 98.2 F | RESPIRATION RATE: 14 BRPM

## 2023-02-15 DIAGNOSIS — R05.1 ACUTE COUGH: ICD-10-CM

## 2023-02-15 DIAGNOSIS — J01.01 ACUTE RECURRENT MAXILLARY SINUSITIS: Primary | ICD-10-CM

## 2023-02-15 PROCEDURE — 99212 OFFICE O/P EST SF 10 MIN: CPT | Performed by: NURSE PRACTITIONER

## 2023-02-15 RX ORDER — GUAIFENESIN AND DEXTROMETHORPHAN HYDROBROMIDE 600; 30 MG/1; MG/1
1 TABLET, EXTENDED RELEASE ORAL 2 TIMES DAILY
Qty: 20 TABLET | Refills: 0 | Status: SHIPPED | OUTPATIENT
Start: 2023-02-15

## 2023-02-15 RX ORDER — AZITHROMYCIN 250 MG/1
TABLET, FILM COATED ORAL
Qty: 6 TABLET | Refills: 0 | Status: SHIPPED | OUTPATIENT
Start: 2023-02-15

## 2023-02-15 NOTE — PROGRESS NOTES
"Chief Complaint  URI (Sinus congestion,  coughing)    Subjective        Brian Garcia presents to [facility]  History of Present Illness   72 yr old male presenting with complaints of sinus congestion and cough for several weeks, denies fever, N/V/D, body aches, SOA or known COVID exposure, has tried OTC sinus medication with minimal relief.     Objective   Vital Signs:  /80 (BP Location: Left arm, Patient Position: Sitting, Cuff Size: Adult)   Pulse 91   Temp 98.2 °F (36.8 °C) (Infrared)   Resp 14   Ht 162.6 cm (64.02\")   Wt 75.8 kg (167 lb)   SpO2 96%   BMI 28.65 kg/m²   Estimated body mass index is 28.65 kg/m² as calculated from the following:    Height as of this encounter: 162.6 cm (64.02\").    Weight as of this encounter: 75.8 kg (167 lb).             Physical Exam  HENT:      Nose: Nasal tenderness and congestion present.   Cardiovascular:      Rate and Rhythm: Normal rate.      Pulses: Normal pulses.      Heart sounds: Normal heart sounds.   Pulmonary:      Effort: Pulmonary effort is normal.      Breath sounds: No wheezing, rhonchi or rales.   Neurological:      General: No focal deficit present.      Mental Status: He is alert and oriented to person, place, and time.   Psychiatric:         Mood and Affect: Mood normal.         Behavior: Behavior normal.        Result Review :                   Assessment and Plan   Diagnoses and all orders for this visit:    1. Acute recurrent maxillary sinusitis (Primary)  -     azithromycin (Zithromax Z-Jeferson) 250 MG tablet; Take 2 tablets by mouth on day 1, then 1 tablet daily on days 2-5  Dispense: 6 tablet; Refill: 0  -     guaifenesin-dextromethorphan (MUCINEX DM)  MG tablet sustained-release 12 hour tablet; Take 1 tablet by mouth 2 (Two) Times a Day.  Dispense: 20 tablet; Refill: 0    2. Acute cough  -     guaifenesin-dextromethorphan (MUCINEX DM)  MG tablet sustained-release 12 hour tablet; Take 1 tablet by mouth " 2 (Two) Times a Day.  Dispense: 20 tablet; Refill: 0              Follow Up   No follow-ups on file.  Patient was given instructions and counseling regarding his condition or for health maintenance advice. Please see specific information pulled into the AVS if appropriate.     Follow-up if symptoms not improved.     Mask and gloves worn

## 2023-03-21 ENCOUNTER — OFFICE VISIT (OUTPATIENT)
Dept: FAMILY MEDICINE CLINIC | Facility: CLINIC | Age: 73
End: 2023-03-21
Payer: MEDICARE

## 2023-03-21 VITALS
OXYGEN SATURATION: 98 % | DIASTOLIC BLOOD PRESSURE: 70 MMHG | BODY MASS INDEX: 29.23 KG/M2 | TEMPERATURE: 98.4 F | WEIGHT: 171.2 LBS | SYSTOLIC BLOOD PRESSURE: 118 MMHG | HEART RATE: 85 BPM | HEIGHT: 64 IN | RESPIRATION RATE: 16 BRPM

## 2023-03-21 DIAGNOSIS — L30.9 DERMATITIS: Primary | ICD-10-CM

## 2023-03-21 PROCEDURE — 99213 OFFICE O/P EST LOW 20 MIN: CPT | Performed by: FAMILY MEDICINE

## 2023-03-21 PROCEDURE — 1159F MED LIST DOCD IN RCRD: CPT | Performed by: FAMILY MEDICINE

## 2023-03-21 PROCEDURE — 1160F RVW MEDS BY RX/DR IN RCRD: CPT | Performed by: FAMILY MEDICINE

## 2023-03-21 NOTE — PROGRESS NOTES
"    Chief Complaint  Rash (Both ankles /Cortizone psoriasis cream for relief )    Subjective    History of Present Illness {CC  Problem List  Visit  Diagnosis   Encounters  Notes  Medications  Labs  Result Review Imaging  Media :23}     Brian Garcia presents to [facility] for   History of Present Illness     71 yo male patient of Dr. Andrey Wolfe present for evaluation of rash on LE bilaterally.    Issues present for the last three weeks.  Yesterday put some coritzone 10 otc which help with itchy.    Denies working in the yard  No change in soap , lotion or detergent.       Social History     Socioeconomic History   • Marital status:    Tobacco Use   • Smoking status: Never   • Smokeless tobacco: Never   Vaping Use   • Vaping Use: Never used   Substance and Sexual Activity   • Alcohol use: Never   • Drug use: Never   • Sexual activity: Defer      Objective     Vital Signs:   /70 (BP Location: Left arm, Patient Position: Sitting, Cuff Size: Adult)   Pulse 85   Temp 98.4 °F (36.9 °C) (Infrared)   Resp 16   Ht 162.6 cm (64.02\")   Wt 77.7 kg (171 lb 3.2 oz)   SpO2 98%   BMI 29.37 kg/m²   Physical Exam  Constitutional:       General: He is not in acute distress.     Appearance: He is not ill-appearing.   Skin:     Findings: Rash present.      Comments: Lower legs just above socks area of erythematous papules and macules   Neurological:      Mental Status: He is alert.        Result Review  Data Reviewed:{ Labs  Result Review  Imaging  Med Tab  Media :23}   The following data was reviewed by: Karolyn Schroeder MD on 03/21/2023  Lab Results - Last 18 Months   Lab Units 12/20/22  0957 05/26/22  0907 12/09/21  1007   GLUCOSE mg/dL  --  122* 94   BUN mg/dL  --  16 23*   CREATININE mg/dL  --  72.7  0.96 0.9   SODIUM mmol/L  --  138 139   POTASSIUM mmol/L  --  4.6 4.4   CHLORIDE mmol/L  --  105 101   CALCIUM mg/dL  --  9.1 9.3   ALBUMIN g/dL  --  4.3  --  "   BILIRUBIN mg/dL  --  0.4  --    ALK PHOS U/L  --  32*  --    AST (SGOT) U/L  --  24  --    ALT (SGPT) U/L  --  31  --    HEMOGLOBIN A1C % 7.4* 7.4* 7.4*   MICROALB UR mg/dL  --  <1.2  --    VIT D 25 HYDROXY ng/mL 36.0  --   --               Current Outpatient Medications:   •  Accu-Chek Softclix Lancets lancets, Use as instructed., Disp: , Rfl:   •  acetaminophen (Tylenol) 325 MG tablet, 2 po tid prn pain, Disp: 60 tablet, Rfl: 3  •  acyclovir (ZOVIRAX) 400 MG tablet, As Needed., Disp: , Rfl:   •  aspirin (aspirin) 81 MG EC tablet, Take 1 tablet by mouth Daily., Disp: 30 tablet, Rfl: 0  •  azithromycin (Zithromax Z-Jeferson) 250 MG tablet, Take 2 tablets by mouth on day 1, then 1 tablet daily on days 2-5, Disp: 6 tablet, Rfl: 0  •  empagliflozin (JARDIANCE) 25 MG tablet tablet, Take 1 tablet by mouth Daily., Disp: , Rfl:   •  Empagliflozin 25 MG tablet, Take 1 tablet by mouth Every Morning., Disp: , Rfl:   •  EPINEPHrine (EPIPEN) 0.3 MG/0.3ML solution auto-injector injection, Inject 0.3 mL into the shoulder, thigh, or buttocks 1 (One) Time for 1 dose. As needed for bee sting or severe allergic reaction, Disp: 1 each, Rfl: 2  •  fluconazole (Diflucan) 100 MG tablet, Take 1 tablet by mouth Daily., Disp: 14 tablet, Rfl: 3  •  fluticasone (Flonase) 50 MCG/ACT nasal spray, 2 sprays into the nostril(s) as directed by provider Daily., Disp: 15 mL, Rfl: 2  •  guaifenesin-dextromethorphan (MUCINEX DM)  MG tablet sustained-release 12 hour tablet, Take 1 tablet by mouth 2 (Two) Times a Day., Disp: 20 tablet, Rfl: 0  •  Ozempic, 1 MG/DOSE, 2 MG/1.5ML solution pen-injector, , Disp: , Rfl:   •  pravastatin (PRAVACHOL) 40 MG tablet, Take 1 tablet by mouth Every Night., Disp: 90 tablet, Rfl: 1  •  Sildenafil Citrate (VIAGRA PO), Take  by mouth As Needed., Disp: , Rfl:   •  triamcinolone (KENALOG) 0.1 % ointment, Apply 1 application topically to the appropriate area as directed 2 (Two) Times a Day., Disp: 30 g, Rfl: 0       Assessment and Plan {CC Problem List  Visit Diagnosis  ROS  Review (Popup)  Health Maintenance  Quality  BestPractice  Medications  SmartSets  SnapShot Encounters  Media :23}   Problem List Items Addressed This Visit    None  Visit Diagnoses     Dermatitis    -  Primary    Relevant Medications    triamcinolone (KENALOG) 0.1 % ointment        Advise to keep area clean and dry   Apply cream for the next two weeks  Keep legs moisturized  Follow with dermatology if no improvement.      Follow Up   Return if symptoms worsen or fail to improve.  Patient was given instructions and counseling regarding his condition or for health maintenance advice. Please see specific information pulled into the AVS if appropriate.    EpicAct:[email or web address]_ORDERS,RunParams:STARTUPTYPE=FOLLOW    MR_[email or web address]_DISCHARGE

## 2024-06-09 ENCOUNTER — HOSPITAL ENCOUNTER (EMERGENCY)
Facility: HOSPITAL | Age: 74
Discharge: HOME | End: 2024-06-09
Attending: EMERGENCY MEDICINE | Admitting: EMERGENCY MEDICINE
Payer: MEDICARE

## 2024-06-09 ENCOUNTER — APPOINTMENT (OUTPATIENT)
Dept: GENERAL RADIOLOGY | Facility: HOSPITAL | Age: 74
End: 2024-06-09
Payer: MEDICARE

## 2024-06-09 VITALS
WEIGHT: 156 LBS | OXYGEN SATURATION: 97 % | HEIGHT: 64 IN | BODY MASS INDEX: 26.63 KG/M2 | DIASTOLIC BLOOD PRESSURE: 74 MMHG | SYSTOLIC BLOOD PRESSURE: 131 MMHG | RESPIRATION RATE: 18 BRPM | TEMPERATURE: 96.6 F | HEART RATE: 68 BPM

## 2024-06-09 DIAGNOSIS — S63.502A SPRAIN OF LEFT WRIST, INITIAL ENCOUNTER: Primary | ICD-10-CM

## 2024-06-09 PROCEDURE — 73110 X-RAY EXAM OF WRIST: CPT

## 2024-06-09 PROCEDURE — 99283 EMERGENCY DEPT VISIT LOW MDM: CPT

## 2024-06-09 NOTE — ED PROVIDER NOTES
EMERGENCY DEPARTMENT ENCOUNTER    Room Number:  10/10  Date seen:  6/9/2024  PCP: Joleen Bautista APRN  Historian: Patient      HPI:  Chief Complaint: Wrist pain  Context: Brian Garcia is a 74 y.o. male who presents to the ED c/o left wrist pain.  The patient states that last night he was walking up some steps and tripped and caught himself with his left hand.  He states that since then he has had some swelling and tenderness to his left wrist.  Patient denies any numbness or tingling.      PAST MEDICAL HISTORY  Active Ambulatory Problems     Diagnosis Date Noted    Acute bronchitis 02/15/2016    Acute pharyngitis 02/15/2016    Acute upper respiratory infection 02/15/2016    Allergic rhinitis 02/15/2016    Carpal tunnel syndrome 02/15/2016    Cough 02/15/2016    Alimentary obesity 02/15/2016    Fatigue 02/15/2016    Effusion of knee 02/15/2016    ED (erectile dysfunction) of organic origin 02/15/2016    Muscle ache 02/15/2016    Thumb pain 02/15/2016    Welcome to Medicare preventive visit 04/17/2017    Controlled type 2 diabetes mellitus without complication, without long-term current use of insulin 12/21/2017    Hyperlipidemia 12/21/2017    Vitamin D deficiency 12/21/2017    Refusal of statin medication by patient 04/19/2018    Male hypogonadism 07/18/2018    Low testosterone 02/27/2019    Benign prostatic hyperplasia without lower urinary tract symptoms 02/27/2019    Hives 05/21/2020    Dizziness 02/21/2021    Slurred speech, transient 02/22/2021    Impaired left ventricular relaxation 02/22/2021    TIA (transient ischemic attack) 02/25/2021    Nonrheumatic tricuspid valve regurgitation 02/25/2021    Bladder cancer 02/25/2021    Fever blister 07/06/2022    Bilateral impacted cerumen 11/09/2022    Chronic right-sided low back pain without sciatica 11/22/2022     Resolved Ambulatory Problems     Diagnosis Date Noted    Non-insulin dependent type 2 diabetes mellitus 02/15/2016    HLD (hyperlipidemia) 02/15/2016     BP (high blood pressure) 02/15/2016    Gonalgia 02/15/2016    Cellulitis of right lower extremity 06/05/2017    Bee sting allergy 06/05/2017    Screening PSA (prostate specific antigen) 07/18/2018     Past Medical History:   Diagnosis Date    Bladder tumor     Diabetes mellitus     History of asthma     History of cardiac murmur as a child     History of skin cancer     Type 2 diabetes mellitus          REVIEW OF SYSTEMS  All systems reviewed and negative except for those discussed in HPI.       PAST SURGICAL HISTORY  Past Surgical History:   Procedure Laterality Date    ADENOIDECTOMY      APPENDECTOMY      CATARACT EXTRACTION, BILATERAL      CERVICAL SPINE SURGERY      LASIK Bilateral     TONSILLECTOMY      TRANSURETHRAL RESECTION OF BLADDER TUMOR N/A 2/4/2020    Procedure: TRANSURETHRAL RESECTION OF BLADDER TUMOR;  Surgeon: Leandro Rodas MD;  Location: [facility] OR;  Service: Urology;  Laterality: N/A;         FAMILY HISTORY  Family History   Problem Relation Age of Onset    Myelodysplastic syndrome Mother     Arrhythmia Mother         Ablation    Diabetes type II Mother     Other Father         Cardiovascular disorder    Hypertension Father     Hyperlipidemia Father     Aneurysm Father         AAA    Diabetes Other     Hyperlipidemia Other     Hypertension Other     Malig Hyperthermia Neg Hx          SOCIAL HISTORY  Social History     Socioeconomic History    Marital status:    Tobacco Use    Smoking status: Never    Smokeless tobacco: Never   Vaping Use    Vaping status: Never Used   Substance and Sexual Activity    Alcohol use: Never    Drug use: Never    Sexual activity: Defer         ALLERGIES  Atorvastatin, Codeine, Doxycycline, Invokana [canagliflozin], Metformin and related, Penicillins, Sulfa antibiotics, Terazosin, and Tramadol      PHYSICAL EXAM  ED Triage Vitals [06/09/24 1042]   Temp Heart Rate Resp BP SpO2   96.6 °F (35.9 °C) 87 18 -- 97 %      Temp src Heart Rate Source Patient  Position BP Location FiO2 (%)   Tympanic Monitor -- -- --       Physical Exam      GENERAL: 74-year-old male in no acute distress  HENT: NCAT: nares patent: Neck supple  MUSCULOSKELETAL: no deformity: Tenderness to his left wrist with swelling but good range of motion and neurovascularly intact in his left hand.  He has good supination and pronation.  He has no injury to his elbow, proximal forearm, hand or fingers.  He has no other injuries.  NEURO: alert with nonfocal neuro exam  PSYCH:  calm, cooperative  SKIN: warm, dry    Vital signs and nursing notes reviewed.      LAB RESULTS  No results found for this or any previous visit (from the past 24 hour(s)).    Ordered the above labs and reviewed the results.        RADIOLOGY  XR Wrist 3+ View Left    Result Date: 6/9/2024  XR WRIST 3+ VW LEFT-   INDICATION: Fall, pain  COMPARISON: None  TECHNIQUE: 4 view left wrist  FINDINGS:  No fracture. No bone lesion. No dislocation. First carpal metacarpal joint osteoarthritis with joint space loss and osteophytosis. Carpal metacarpal osteophytosis seen on the lateral radiograph.       1. No fracture or dislocation. 2. Moderate first carpal metacarpal joint osteoarthritis  This report was finalized on 6/9/2024 11:16 AM by Dr. Mahesh Pierre M.D on Workstation: [id number]       Ordered the above noted radiological studies. Reviewed by me in PACS.            PROCEDURES  Procedures          MEDICATIONS GIVEN IN ER  Medications - No data to display          MEDICAL DECISION MAKING, PROGRESS, and CONSULTS    All labs have been independently reviewed by me.  All radiology studies have been reviewed by me and I have also reviewed the radiology report.   EKG's independently viewed and interpreted by me.  Discussion below represents my analysis of pertinent findings related to patient's condition, differential diagnosis, treatment plan and final disposition.      Additional sources:  - External (non-ED) record review: I reviewed  the patient's office visit with his PCP from April 2024 where he was seen for diabetes, hypercholesterolemia and well visit checkup    - Chronic or social conditions impacting care: Patient is from home    - Shared decision making: After shared decision-making discussion we agree he is stable for discharge and outpatient follow-up      Orders placed during this visit:  Orders Placed This Encounter   Procedures    XR Wrist 3+ View Left    Obtain & Apply The Following- Upper extremity; Wrist cock-up         Differential diagnosis:  My differential diagnosis includes but is not limited to fracture, dislocation, contusion or sprain      Independent interpretation of labs, radiology studies, and discussions with consultants:  ED Course as of 06/09/24 1203   Sun Jun 09, 2024   1059 I will obtain x-ray for further evaluation. [GP]   1122 My independent interpretation the patient's left wrist x-ray is no fracture or dislocation [GP]   1201 On repeat evaluation I advised the patient that his x-rays show no fracture or dislocation.  Will place him in a splint with outpatient follow-up.  He understands and agrees the plan. [GP]      ED Course User Index  [GP] Adalberto Castro MD               DIAGNOSIS  Final diagnoses:   Sprain of left wrist, initial encounter         DISPOSITION  Discharged            Latest Documented Vital Signs:  As of 12:03 EDT  BP- 132/74 HR- 87 Temp- 96.6 °F (35.9 °C) (Tympanic) O2 sat- 97%--      --------------------  Please note that portions of this were completed with a voice recognition program.       Note Disclaimer: At [facility], we believe that sharing information builds trust and better relationships. You are receiving this note because you are receiving care at [facility] or recently visited. It is possible you will see health information before a provider has talked with you about it. This kind of information can be easy to misunderstand. To help you fully understand what it means  for your health, we urge you to discuss this note with your provider.             Adalberto Castro MD  06/09/24 2107

## 2024-06-09 NOTE — DISCHARGE INSTRUCTIONS
Wear splint at all times except to shower for the next week.  Use Tylenol or ibuprofen for pain.  Ice as tolerated.  Follow-up with Dr. Velarde next week if not better

## (undated) DEVICE — TIDISHIELD UROLOGY DRAIN BAGS FROSTY VINYL STERILE FITS SIEMENS UROSKOP ACCESS 20 PER CASE: Brand: TIDISHIELD

## (undated) DEVICE — LOU TUR: Brand: MEDLINE INDUSTRIES, INC.

## (undated) DEVICE — GLV SURG BIOGEL LTX PF 8

## (undated) DEVICE — Device: Brand: OLYMPUS